# Patient Record
Sex: FEMALE | Race: WHITE | Employment: OTHER | URBAN - METROPOLITAN AREA
[De-identification: names, ages, dates, MRNs, and addresses within clinical notes are randomized per-mention and may not be internally consistent; named-entity substitution may affect disease eponyms.]

---

## 2019-02-12 ENCOUNTER — APPOINTMENT (OUTPATIENT)
Dept: CT IMAGING | Age: 69
End: 2019-02-12
Attending: EMERGENCY MEDICINE
Payer: MEDICARE

## 2019-02-12 ENCOUNTER — APPOINTMENT (OUTPATIENT)
Dept: GENERAL RADIOLOGY | Age: 69
End: 2019-02-12
Attending: EMERGENCY MEDICINE
Payer: MEDICARE

## 2019-02-12 ENCOUNTER — HOSPITAL ENCOUNTER (EMERGENCY)
Age: 69
Discharge: HOME | End: 2019-02-12
Attending: EMERGENCY MEDICINE
Payer: MEDICARE

## 2019-02-12 VITALS
TEMPERATURE: 97.5 F | WEIGHT: 190 LBS | HEIGHT: 68 IN | SYSTOLIC BLOOD PRESSURE: 151 MMHG | RESPIRATION RATE: 16 BRPM | BODY MASS INDEX: 28.79 KG/M2 | HEART RATE: 69 BPM | OXYGEN SATURATION: 96 % | DIASTOLIC BLOOD PRESSURE: 83 MMHG

## 2019-02-12 DIAGNOSIS — S52.124A CLOSED NONDISPLACED FRACTURE OF HEAD OF RIGHT RADIUS, INITIAL ENCOUNTER: Primary | ICD-10-CM

## 2019-02-12 PROCEDURE — 99283 EMERGENCY DEPT VISIT LOW MDM: CPT

## 2019-02-12 PROCEDURE — 73090 X-RAY EXAM OF FOREARM: CPT

## 2019-02-12 PROCEDURE — 74011250637 HC RX REV CODE- 250/637: Performed by: EMERGENCY MEDICINE

## 2019-02-12 PROCEDURE — 70450 CT HEAD/BRAIN W/O DYE: CPT

## 2019-02-12 PROCEDURE — 70486 CT MAXILLOFACIAL W/O DYE: CPT

## 2019-02-12 PROCEDURE — 71101 X-RAY EXAM UNILAT RIBS/CHEST: CPT

## 2019-02-12 PROCEDURE — A4565 SLINGS: HCPCS

## 2019-02-12 PROCEDURE — 73070 X-RAY EXAM OF ELBOW: CPT

## 2019-02-12 PROCEDURE — 93005 ELECTROCARDIOGRAM TRACING: CPT

## 2019-02-12 RX ORDER — HYDROCODONE BITARTRATE AND ACETAMINOPHEN 5; 325 MG/1; MG/1
2 TABLET ORAL
Status: COMPLETED | OUTPATIENT
Start: 2019-02-12 | End: 2019-02-12

## 2019-02-12 RX ORDER — HYDROCODONE BITARTRATE AND ACETAMINOPHEN 5; 325 MG/1; MG/1
1 TABLET ORAL
Qty: 15 TAB | Refills: 0 | Status: SHIPPED | OUTPATIENT
Start: 2019-02-12

## 2019-02-12 RX ADMIN — HYDROCODONE BITARTRATE AND ACETAMINOPHEN 2 TABLET: 5; 325 TABLET ORAL at 20:59

## 2019-02-12 NOTE — ED PROVIDER NOTES
EMERGENCY DEPARTMENT HISTORY AND PHYSICAL EXAM 
 
 
Date: 2/12/2019 Patient Name: Joshua Dooley History of Presenting Illness Chief Complaint Patient presents with  Fall  
  pt reports she lost her balance and fell just before coming to ED, reports left lower arm pain, headache, pain to left side of face, and chest pain  Arm Injury  Facial Pain  Chest Pain History Provided By: Patient HPI: Joshua Dooley, 77 y.o. female with no significant PMHx, presents ambulatory to the ED with cc of a R forearm and mid chest pain s/p a fall PTA today. She denies any associated symptoms. She states she had gotten up from the table and did not see her dog, when she had tripped over the pet and fallen onto her R side on engineered hardwood tima. She had attempted to break the fall with her R hand, but states she had injured her arm instead. She endorses hitting her head, but denies any LOC. Pt reports an exacerbating of pain in her R forearm with the application of pressure. She endorses taking Tylenol PTA. Pt denies any lower extremity complaints. There are no other complaints, changes, or physical findings at this time. PCP: Jagruti Kay MD 
 
No current facility-administered medications on file prior to encounter. No current outpatient medications on file prior to encounter. Past History Past Medical History: No past medical history on file. Past Surgical History: No past surgical history on file. Family History: No family history on file. Social History: 
Social History Tobacco Use  Smoking status: Not on file Substance Use Topics  Alcohol use: Not on file  Drug use: Not on file Allergies: Allergies Allergen Reactions  Bactrim [Sulfamethoprim] Other (comments) Vision change  Sulfa (Sulfonamide Antibiotics) Other (comments) Review of Systems Review of Systems Constitutional: Negative for activity change, chills and fever. HENT: Negative for congestion and sore throat. Eyes: Negative for pain and redness. Respiratory: Negative for cough, chest tightness and shortness of breath. Cardiovascular: Positive for chest pain. Negative for palpitations. Gastrointestinal: Negative for abdominal pain, diarrhea, nausea and vomiting. Genitourinary: Negative for dysuria, frequency and urgency. Musculoskeletal: Positive for myalgias (+RUE). Negative for back pain and neck pain. Skin: Negative for rash. Neurological: Negative for syncope, light-headedness and headaches. Psychiatric/Behavioral: Negative for confusion. All other systems reviewed and are negative. Physical Exam  
Physical Exam  
Constitutional: She is oriented to person, place, and time. She appears well-developed and well-nourished. No distress. HENT:  
Head: Normocephalic. Nose: Nose normal.  
Mouth/Throat: Oropharynx is clear and moist. No oropharyngeal exudate. Eyes: Conjunctivae are normal. Pupils are equal, round, and reactive to light. No scleral icterus. Neck: Normal range of motion. Neck supple. No JVD present. No tracheal deviation present. No thyromegaly present. No midline vertebral tenderness; full AROM without pain  
Cardiovascular: Normal rate, regular rhythm and intact distal pulses. Exam reveals no gallop and no friction rub. No murmur heard. Pulmonary/Chest: Effort normal and breath sounds normal. No stridor. No respiratory distress. She has no wheezes. She has no rales. Abdominal: Soft. Bowel sounds are normal. She exhibits no distension. There is no tenderness. There is no rebound and no guarding. Musculoskeletal: Normal range of motion. She exhibits tenderness. She exhibits no edema.   
Moderate tenderness mid shaft radius; shoulder/upper arm non-tender; hand/wrist non-tender; mild diffuse tenderness elbow; no gross deformity; skin intact; 2+ radial/ulnar pulse Lymphadenopathy:  
  She has no cervical adenopathy. Neurological: She is alert and oriented to person, place, and time. No cranial nerve deficit. She exhibits normal muscle tone. Coordination normal.  
Skin: Skin is warm and dry. No rash noted. She is not diaphoretic. No erythema. Psychiatric: She has a normal mood and affect. Her behavior is normal.  
Nursing note and vitals reviewed. Diagnostic Study Results Labs - Recent Results (from the past 12 hour(s)) EKG, 12 LEAD, INITIAL Collection Time: 02/12/19  6:32 PM  
Result Value Ref Range Ventricular Rate 63 BPM  
 Atrial Rate 62 BPM  
 QRS Duration 70 ms Q-T Interval 452 ms QTC Calculation (Bezet) 462 ms Calculated R Axis 21 degrees Calculated T Axis 50 degrees Diagnosis Junctional rhythm with premature ventricular complexes or fusion complexes Cannot rule out Anterior infarct , age undetermined No previous ECGs available Radiologic Studies -  
XR RIBS RT W PA CXR MIN 3 V Final Result IMPRESSION: Clear lungs. No pneumothorax. No rib fracture visualized. XR ELBOW RT AP/LAT Final Result IMPRESSION: Right elbow joint effusion. Probable minimally displaced radial head  
fracture. XR FOREARM RT AP/LAT Final Result IMPRESSION: No acute fracture. CT Results  (Last 48 hours) 02/12/19 1939  CT HEAD WO CONT Final result Impression:  IMPRESSION: No acute intracranial hemorrhage, mass or infarct. Narrative:  INDICATION: Head trauma, closed, mild, abn neuro exam and/or risk factors. Fall,  
headache. Exam: Noncontrast CT of the brain is performed with 5 mm collimation. CT dose reduction was achieved with the use of the standardized protocol  
tailored for this examination and automatic exposure control for dose  
modulation. FINDINGS: There is no acute intracranial hemorrhage, mass, mass effect or herniation. Ventricular system is normal. The gray-white matter differentiation  
is well-preserved. The mastoid air cells are well pneumatized. The visualized  
paranasal sinuses are normal.  
   
  
 02/12/19 1939  CT MAXILLOFACIAL WO CONT Final result Impression:  IMPRESSION: No acute fracture. Narrative:  INDICATION: Fall, left facial pain. Exam: Noncontrast CT of the face is performed with 2.5 mm collimation. Coronal  
and sagittal reformatted images were also obtained. CT dose reduction was achieved through the use of a standardized protocol  
tailored for this examination and automatic exposure control for dose  
modulation. FINDINGS: There is a 2 mm density seen within the subcutis fat superficial to  
the left frontal sinus; no associated laceration is visualized. No acute  
fracture is visualized. Visualized articulations are normal. No air-fluid level  
is seen within the paranasal sinuses. The globes are intact bilaterally. Orbits  
are intact. Extraocular muscles and optic nerves are grossly normal. Mastoid air  
cells are well pneumatized. Osseous structures are diffusely demineralized. Medical Decision Making I am the first provider for this patient. I reviewed the vital signs, available nursing notes, past medical history, past surgical history, family history and social history. Vital Signs-Reviewed the patient's vital signs. Patient Vitals for the past 12 hrs: 
 Temp Pulse Resp BP SpO2  
02/12/19 1826 97.5 °F (36.4 °C) 69 16 151/83 96 % Records Reviewed: Nursing Notes, Old Medical Records, Previous Radiology Studies and Previous Laboratory Studies Provider Notes (Medical Decision Making): DDx: fracture, contusion, ICH 
 
ED Course:  
Initial assessment performed.  The patients presenting problems have been discussed, and they are in agreement with the care plan formulated and outlined with them. I have encouraged them to ask questions as they arise throughout their visit. PROGRESS NOTE: 
8:54 PM 
Tiger texting orthopedics SALVADOR Morton and Dr. Hamilton Kenny. PROGRESS NOTE: 
8:58 PM 
Dr. Hamilton Kenny recommends placing a nonweight bearing sling for comfort. She will see the pt in office on Friday (2/15) or Monday (2/18). ED EKG interpretation: 18:32 Rhythm: sinus vs junctional rhythm; and regular . Rate (approx.): 63; Axis: normal; ID Interval: n/a; QRS interval: normal ; ST/T wave: non-specific changes; This EKG was interpreted by Luisa Flood MD,ED Provider. Critical Care Time:  
0 Disposition: 
DISCHARGE NOTE 
8:59 PM 
The patient has been re-evaluated and is ready for discharge. Reviewed available results with patient. Counseled patient on diagnosis and care plan. Patient has expressed understanding, and all questions have been answered. Patient agrees with plan and agrees to follow up as recommended, or return to the ED if their symptoms worsen. Discharge instructions have been provided and explained to the patient, along with reasons to return to the ED. PLAN: 
1. Discharge Current Discharge Medication List  
  
START taking these medications Details HYDROcodone-acetaminophen (NORCO) 5-325 mg per tablet Take 1 Tab by mouth every four (4) hours as needed for Pain. Max Daily Amount: 6 Tabs. Qty: 15 Tab, Refills: 0 Associated Diagnoses: Closed nondisplaced fracture of head of right radius, initial encounter 2. Follow-up Information Follow up With Specialties Details Why Contact Info Fahad Arredondo MD Hand Surgery Schedule an appointment as soon as possible for a visit in 2 days Call the office tomorrow. Dr. Hamilton Kenny would like to see you Friday or next monday. [facility] [address] [place] [address] 
[phone] [facility] EMERGENCY DEPT Emergency Medicine Go in 1 day If symptoms worsen [address] [facility] [address] [facility] 
[phone] Return to ED if worse Diagnosis Clinical Impression: 1. Closed nondisplaced fracture of head of right radius, initial encounter Attestations: This note is prepared by Jensen Au, acting as Scribe for Jonh Guzmán MD. Jonh Guzmán MD: The scribe's documentation has been prepared under my direction and personally reviewed by me in its entirety. I confirm that the note above accurately reflects all work, treatment, procedures, and medical decision making performed by me. This note will not be viewable in [address]

## 2019-02-13 LAB
ATRIAL RATE: 62 BPM
CALCULATED R AXIS, ECG10: 21 DEGREES
CALCULATED T AXIS, ECG11: 50 DEGREES
DIAGNOSIS, 93000: NORMAL
Q-T INTERVAL, ECG07: 452 MS
QRS DURATION, ECG06: 70 MS
QTC CALCULATION (BEZET), ECG08: 462 MS
VENTRICULAR RATE, ECG03: 63 BPM

## 2019-02-13 NOTE — PROGRESS NOTES
7:00 PM 
Verbal report received from Katlyn Watts RN. Patient presents with complaints of a fall that resulted in arm pain, chest pain, and face pain. 7:18 PM 
Patient off floor to CT.

## 2019-02-13 NOTE — DISCHARGE INSTRUCTIONS
Patient Education        Broken Arm: Care Instructions  Your Care Instructions  Fractures can range from a small, hairline crack, to a bone or bones broken into two or more pieces. Your treatment depends on how bad the break is. Your doctor may have put your arm in a splint or cast to allow it to heal or to keep it stable until you see another doctor. It may take weeks or months for your arm to heal. You can help your arm heal with some care at home. You heal best when you take good care of yourself. Eat a variety of healthy foods, and don't smoke. You may have had a sedative to help you relax. You may be unsteady after having sedation. It can take a few hours for the medicine's effects to wear off. Common side effects of sedation include nausea, vomiting, and feeling sleepy or tired. The doctor has checked you carefully, but problems can develop later. If you notice any problems or new symptoms, get medical treatment right away. Follow-up care is a key part of your treatment and safety. Be sure to make and go to all appointments, and call your doctor if you are having problems. It's also a good idea to know your test results and keep a list of the medicines you take. How can you care for yourself at home? · If the doctor gave you a sedative:  ? For 24 hours, don't do anything that requires attention to detail. It takes time for the medicine's effects to completely wear off.  ? For your safety, do not drive or operate any machinery that could be dangerous. Wait until the medicine wears off and you can think clearly and react easily. · Put ice or a cold pack on your arm for 10 to 20 minutes at a time. Try to do this every 1 to 2 hours for the next 3 days (when you are awake). Put a thin cloth between the ice and your cast or splint. Keep the cast or splint dry. · Follow the cast care instructions your doctor gives you. If you have a splint, do not take it off unless your doctor tells you to.   · Be safe with medicines. Take pain medicines exactly as directed. ? If the doctor gave you a prescription medicine for pain, take it as prescribed. ? If you are not taking a prescription pain medicine, ask your doctor if you can take an over-the-counter medicine. · Prop up your arm on pillows when you sit or lie down in the first few days after the injury. Keep the arm higher than the level of your heart. This will help reduce swelling. · Follow instructions for exercises to keep your arm strong. · Wiggle your fingers and wrist often to reduce swelling and stiffness. When should you call for help? Call 911 anytime you think you may need emergency care. For example, call if:    · You are very sleepy and you have trouble waking up.    Call your doctor now or seek immediate medical care if:    · You have new or worse nausea or vomiting.     · You have new or worse pain.     · Your hand or fingers are cool or pale or change color.     · Your cast or splint feels too tight.     · You have tingling, weakness, or numbness in your hand or fingers.    Watch closely for changes in your health, and be sure to contact your doctor if:    · You do not get better as expected.     · You have problems with your cast or splint. Where can you learn more? Go to http://wesly-serena.info/. Enter B945 in the search box to learn more about \"Broken Arm: Care Instructions. \"  Current as of: September 20, 2018  Content Version: 11.9  © 9789-2330 [employer]. Care instructions adapted under license by [employer] (which disclaims liability or warranty for this information). If you have questions about a medical condition or this instruction, always ask your healthcare professional. [place] [address] any warranty or liability for your use of this information.          Patient Education        Broken Radial Head of the Elbow: Care Instructions  Your Care Instructions  The radius is one of the two long bones in your lower arm. The radial head is the small part of this bone near the elbow. This bone may break (fracture) during sports or an accident. It may happen when your arm is hit or is used to protect you in a fall. Fractures can range from a small, hairline crack to a bone that is broken into two or more pieces. Your treatment depends on how bad the break is. Your doctor may have put your arm in a cast or splint. This will allow your elbow to heal or will keep it stable until you see another doctor. You also might wear a sling to help support your arm. It may take weeks or months for your elbow to heal. You can help it heal with some care at home. You heal best when you take good care of yourself. Eat a variety of healthy foods, and don't smoke. You may have had a sedative to help you relax. You may be unsteady after having sedation. It can take a few hours for the medicine's effects to wear off. Common side effects of sedation include nausea, vomiting, and feeling sleepy or tired. The doctor has checked you carefully, but problems can develop later. If you notice any problems or new symptoms, get medical treatment right away. Follow-up care is a key part of your treatment and safety. Be sure to make and go to all appointments, and call your doctor if you are having problems. It's also a good idea to know your test results and keep a list of the medicines you take. How can you care for yourself at home? · If the doctor gave you a sedative:  ? For 24 hours, don't do anything that requires attention to detail. It takes time for the medicine's effects to completely wear off.  ? For your safety, do not drive or operate any machinery that could be dangerous. Wait until the medicine wears off and you can think clearly and react easily. · Put ice or a cold pack on your arm for 10 to 20 minutes at a time. Try to do this every 1 to 2 hours for the next 3 days (when you are awake).  Put a thin cloth between the ice and your cast or splint. Keep your cast or splint dry. · Follow the cast care instructions your doctor gives you. If you have a splint, do not take it off unless your doctor tells you to. · Be safe with medicines. Read and follow all instructions on the label. ? If the doctor gave you a prescription medicine for pain, take it as prescribed. ? If you are not taking a prescription pain medicine, ask your doctor if you can take an over-the-counter medicine. · Prop up the sore arm on a pillow when you ice it or anytime you sit or lie down during the next 3 days. Try to keep it above the level of your heart. This will help reduce swelling. · Follow instructions for exercises to keep your arm strong. · Wiggle your fingers and wrist often to reduce swelling and stiffness. When should you call for help? Call your doctor now or seek immediate medical care if:    · You have new or worse pain.     · Your hand or fingers are cool or pale or change color.     · Your cast or splint feels too tight.     · You have tingling, weakness, or numbness in your hand or fingers.    Watch closely for changes in your health, and be sure to contact your doctor if:    · You do not get better as expected.     · You have problems with your cast or splint. Where can you learn more? Go to http://wesly-serena.info/. Enter [id number] in the search box to learn more about \"Broken Radial Head of the Elbow: Care Instructions. \"  Current as of: September 20, 2018  Content Version: 11.9  © 2794-3012 [employer]. Care instructions adapted under license by [employer] (which disclaims liability or warranty for this information). If you have questions about a medical condition or this instruction, always ask your healthcare professional. [address] any warranty or liability for your use of this information.

## 2019-10-14 ENCOUNTER — HOSPITAL ENCOUNTER (OUTPATIENT)
Dept: ULTRASOUND IMAGING | Age: 69
Discharge: HOME | End: 2019-10-14
Attending: FAMILY MEDICINE
Payer: MEDICARE

## 2019-10-14 DIAGNOSIS — E04.1 NONTOXIC UNINODULAR GOITER: ICD-10-CM

## 2019-10-14 PROCEDURE — 76536 US EXAM OF HEAD AND NECK: CPT

## 2021-02-26 ENCOUNTER — APPOINTMENT (OUTPATIENT)
Dept: GENERAL RADIOLOGY | Age: 71
End: 2021-02-26
Attending: EMERGENCY MEDICINE
Payer: MEDICARE

## 2021-02-26 ENCOUNTER — APPOINTMENT (OUTPATIENT)
Dept: CT IMAGING | Age: 71
End: 2021-02-26
Attending: EMERGENCY MEDICINE
Payer: MEDICARE

## 2021-02-26 ENCOUNTER — HOSPITAL ENCOUNTER (EMERGENCY)
Age: 71
Discharge: HOME | End: 2021-02-26
Attending: EMERGENCY MEDICINE
Payer: MEDICARE

## 2021-02-26 VITALS
HEIGHT: 67 IN | OXYGEN SATURATION: 97 % | RESPIRATION RATE: 18 BRPM | BODY MASS INDEX: 33.77 KG/M2 | TEMPERATURE: 98.3 F | HEART RATE: 72 BPM | SYSTOLIC BLOOD PRESSURE: 151 MMHG | WEIGHT: 215.17 LBS | DIASTOLIC BLOOD PRESSURE: 78 MMHG

## 2021-02-26 DIAGNOSIS — M75.102 LEFT ROTATOR CUFF TEAR ARTHROPATHY: ICD-10-CM

## 2021-02-26 DIAGNOSIS — E86.0 DEHYDRATION: ICD-10-CM

## 2021-02-26 DIAGNOSIS — M12.812 LEFT ROTATOR CUFF TEAR ARTHROPATHY: ICD-10-CM

## 2021-02-26 DIAGNOSIS — R40.4 ALTERED AWARENESS, TRANSIENT: Primary | ICD-10-CM

## 2021-02-26 LAB
ALBUMIN SERPL-MCNC: 3.4 G/DL (ref 3.5–5)
ALBUMIN/GLOB SERPL: 1 {RATIO} (ref 1.1–2.2)
ALP SERPL-CCNC: 67 U/L (ref 45–117)
ALT SERPL-CCNC: 20 U/L (ref 12–78)
ANION GAP SERPL CALC-SCNC: 4 MMOL/L (ref 5–15)
APPEARANCE UR: CLEAR
AST SERPL-CCNC: 50 U/L (ref 15–37)
BACTERIA URNS QL MICRO: NEGATIVE /HPF
BASOPHILS # BLD: 0 K/UL (ref 0–0.1)
BASOPHILS NFR BLD: 0 % (ref 0–1)
BILIRUB SERPL-MCNC: 2.1 MG/DL (ref 0.2–1)
BILIRUB UR QL CFM: NEGATIVE
BUN SERPL-MCNC: 28 MG/DL (ref 6–20)
BUN/CREAT SERPL: 30 (ref 12–20)
CALCIUM SERPL-MCNC: 8.9 MG/DL (ref 8.5–10.1)
CAOX CRY URNS QL MICRO: ABNORMAL
CHLORIDE SERPL-SCNC: 109 MMOL/L (ref 97–108)
CO2 SERPL-SCNC: 30 MMOL/L (ref 21–32)
COLOR UR: ABNORMAL
CREAT SERPL-MCNC: 0.92 MG/DL (ref 0.55–1.02)
DIFFERENTIAL METHOD BLD: ABNORMAL
EOSINOPHIL # BLD: 0.1 K/UL (ref 0–0.4)
EOSINOPHIL NFR BLD: 3 % (ref 0–7)
EPITH CASTS URNS QL MICRO: ABNORMAL /LPF
ERYTHROCYTE [DISTWIDTH] IN BLOOD BY AUTOMATED COUNT: 12.9 % (ref 11.5–14.5)
GLOBULIN SER CALC-MCNC: 3.3 G/DL (ref 2–4)
GLUCOSE SERPL-MCNC: 115 MG/DL (ref 65–100)
GLUCOSE UR STRIP.AUTO-MCNC: NEGATIVE MG/DL
HCT VFR BLD AUTO: 35.3 % (ref 35–47)
HGB BLD-MCNC: 11.4 G/DL (ref 11.5–16)
HGB UR QL STRIP: ABNORMAL
HYALINE CASTS URNS QL MICRO: ABNORMAL /LPF (ref 0–5)
IMM GRANULOCYTES # BLD AUTO: 0 K/UL (ref 0–0.04)
IMM GRANULOCYTES NFR BLD AUTO: 0 % (ref 0–0.5)
KETONES UR QL STRIP.AUTO: ABNORMAL MG/DL
LEUKOCYTE ESTERASE UR QL STRIP.AUTO: NEGATIVE
LYMPHOCYTES # BLD: 0.9 K/UL (ref 0.8–3.5)
LYMPHOCYTES NFR BLD: 18 % (ref 12–49)
MCH RBC QN AUTO: 32.8 PG (ref 26–34)
MCHC RBC AUTO-ENTMCNC: 32.3 G/DL (ref 30–36.5)
MCV RBC AUTO: 101.4 FL (ref 80–99)
MONOCYTES # BLD: 0.5 K/UL (ref 0–1)
MONOCYTES NFR BLD: 10 % (ref 5–13)
MUCOUS THREADS URNS QL MICRO: ABNORMAL /LPF
NEUTS SEG # BLD: 3.5 K/UL (ref 1.8–8)
NEUTS SEG NFR BLD: 69 % (ref 32–75)
NITRITE UR QL STRIP.AUTO: NEGATIVE
NRBC # BLD: 0 K/UL (ref 0–0.01)
NRBC BLD-RTO: 0 PER 100 WBC
PH UR STRIP: 5.5 [PH] (ref 5–8)
PLATELET # BLD AUTO: 131 K/UL (ref 150–400)
PMV BLD AUTO: 10.1 FL (ref 8.9–12.9)
POTASSIUM SERPL-SCNC: 3.9 MMOL/L (ref 3.5–5.1)
PROT SERPL-MCNC: 6.7 G/DL (ref 6.4–8.2)
PROT UR STRIP-MCNC: ABNORMAL MG/DL
RBC # BLD AUTO: 3.48 M/UL (ref 3.8–5.2)
RBC #/AREA URNS HPF: ABNORMAL /HPF (ref 0–5)
SODIUM SERPL-SCNC: 143 MMOL/L (ref 136–145)
SP GR UR REFRACTOMETRY: >1.03 (ref 1–1.03)
UROBILINOGEN UR QL STRIP.AUTO: 1 EU/DL (ref 0.2–1)
WBC # BLD AUTO: 5.1 K/UL (ref 3.6–11)
WBC URNS QL MICRO: ABNORMAL /HPF (ref 0–4)

## 2021-02-26 PROCEDURE — 36415 COLL VENOUS BLD VENIPUNCTURE: CPT

## 2021-02-26 PROCEDURE — 73030 X-RAY EXAM OF SHOULDER: CPT

## 2021-02-26 PROCEDURE — 70450 CT HEAD/BRAIN W/O DYE: CPT

## 2021-02-26 PROCEDURE — 96360 HYDRATION IV INFUSION INIT: CPT

## 2021-02-26 PROCEDURE — 99285 EMERGENCY DEPT VISIT HI MDM: CPT

## 2021-02-26 PROCEDURE — 85025 COMPLETE CBC W/AUTO DIFF WBC: CPT

## 2021-02-26 PROCEDURE — 74011250636 HC RX REV CODE- 250/636: Performed by: EMERGENCY MEDICINE

## 2021-02-26 PROCEDURE — 96361 HYDRATE IV INFUSION ADD-ON: CPT

## 2021-02-26 PROCEDURE — 81001 URINALYSIS AUTO W/SCOPE: CPT

## 2021-02-26 PROCEDURE — 80053 COMPREHEN METABOLIC PANEL: CPT

## 2021-02-26 RX ADMIN — SODIUM CHLORIDE 1000 ML: 9 INJECTION, SOLUTION INTRAVENOUS at 12:08

## 2021-02-26 NOTE — ED NOTES
Dr. Brielle Jose at bedside to provide discharge paperwork. Vital signs stable. Pt in no apparent distress at this time. Mental status at baseline. Wheeled out by ER staff, discharge paperwork in hand. Patient and or family acknowledged and signed discharge instructions that were created/provided by the Physician. Signed discharge form with patient label placed in scan box. Accompanied by family to drive pt home.

## 2021-02-26 NOTE — ED PROVIDER NOTES
EMERGENCY DEPARTMENT HISTORY AND PHYSICAL EXAM      Date: 2/26/2021  Patient Name: Coy Albarran    History of Presenting Illness     Chief Complaint   Patient presents with    Altered mental status     Tuesday and Wednesday she was found to be very confused. Diagnosed with a UTI. She does not remember a fall but this morning they noted a large purpling bruise on her left flank and left shoulder pain.  Shoulder Pain     Left shoulder pain. History Provided By: Patient and Patient's Daughter    HPI: Coy Albarran, 80 y.o. female presents to the ED with cc of altered mental status and left shoulder pain. The patient's confusion started 3 days ago. She was diagnosed with a urinary tract infection and started antibiotics 2 days ago. She has a bruise on her left upper flank and buttock and left shoulder pain. She does not remember falling, and does not know how she got the bruising. Left shoulder pain is worse with movement, but is of moderate severity at rest.  Patient denies headache, chest pain, neck pain, cough, shortness of breath, fever or chills. She takes baby aspirin, but is not on any other blood thinners. She denies numbness or tingling. The confusion, her daughter is concerned about, is the fact that the patient does not remember falling. It is not clear that she did fall, because no one witnessed it. There are no other complaints, changes, or physical findings at this time. PCP: Trae Pickens MD    No current facility-administered medications on file prior to encounter. Current Outpatient Medications on File Prior to Encounter   Medication Sig Dispense Refill    HYDROcodone-acetaminophen (NORCO) 5-325 mg per tablet Take 1 Tab by mouth every four (4) hours as needed for Pain. Max Daily Amount: 6 Tabs. 15 Tab 0       Past History     Past Medical History:  History reviewed. No pertinent past medical history. Past Surgical History:  History reviewed.  No pertinent surgical history. Family History:  History reviewed. No pertinent family history. Social History:  Social History     Tobacco Use    Smoking status: Never Smoker    Smokeless tobacco: Never Used   Substance Use Topics    Alcohol use: Never     Frequency: Never    Drug use: Never       Allergies: Allergies   Allergen Reactions    Bactrim [Sulfamethoprim] Other (comments)     Vision change    Sulfa (Sulfonamide Antibiotics) Other (comments)         Review of Systems   Review of Systems   Constitutional: Negative for fever. HENT: Negative for congestion. Eyes: Negative. Respiratory: Negative for shortness of breath. Cardiovascular: Negative for chest pain. Gastrointestinal: Negative for abdominal pain. Endocrine: Negative for heat intolerance. Genitourinary: Negative for dysuria. Musculoskeletal: Negative for back pain. Skin: Negative for rash. Allergic/Immunologic: Negative for immunocompromised state. Neurological: Negative for dizziness. Hematological: Bruises/bleeds easily. Psychiatric/Behavioral: Negative. All other systems reviewed and are negative. Physical Exam   Physical Exam  Vitals signs and nursing note reviewed. Constitutional:       General: She is not in acute distress. Appearance: She is well-developed. HENT:      Head: Normocephalic. Neck:      Musculoskeletal: Normal range of motion and neck supple. Cardiovascular:      Rate and Rhythm: Normal rate and regular rhythm. Heart sounds: Normal heart sounds. Pulmonary:      Effort: Pulmonary effort is normal.      Breath sounds: Normal breath sounds. Abdominal:      General: Bowel sounds are normal.      Palpations: Abdomen is soft. Tenderness: There is no abdominal tenderness. Musculoskeletal: Normal range of motion. General: Tenderness present. Comments: Left shoulder tenderness   Skin:     General: Skin is warm and dry.       Comments: Contusion of left upper flank Neurological:      Mental Status: She is alert. Comments: Alert and oriented x2+, the patient knew the year and the month but stated it was the 20th. Psychiatric:         Mood and Affect: Mood normal.         Behavior: Behavior normal.         Diagnostic Study Results     Labs -     Recent Results (from the past 12 hour(s))   CBC WITH AUTOMATED DIFF    Collection Time: 02/26/21 10:42 AM   Result Value Ref Range    WBC 5.1 3.6 - 11.0 K/uL    RBC 3.48 (L) 3.80 - 5.20 M/uL    HGB 11.4 (L) 11.5 - 16.0 g/dL    HCT 35.3 35.0 - 47.0 %    .4 (H) 80.0 - 99.0 FL    MCH 32.8 26.0 - 34.0 PG    MCHC 32.3 30.0 - 36.5 g/dL    RDW 12.9 11.5 - 14.5 %    PLATELET 134 (L) 457 - 400 K/uL    MPV 10.1 8.9 - 12.9 FL    NRBC 0.0 0  WBC    ABSOLUTE NRBC 0.00 0.00 - 0.01 K/uL    NEUTROPHILS 69 32 - 75 %    LYMPHOCYTES 18 12 - 49 %    MONOCYTES 10 5 - 13 %    EOSINOPHILS 3 0 - 7 %    BASOPHILS 0 0 - 1 %    IMMATURE GRANULOCYTES 0 0.0 - 0.5 %    ABS. NEUTROPHILS 3.5 1.8 - 8.0 K/UL    ABS. LYMPHOCYTES 0.9 0.8 - 3.5 K/UL    ABS. MONOCYTES 0.5 0.0 - 1.0 K/UL    ABS. EOSINOPHILS 0.1 0.0 - 0.4 K/UL    ABS. BASOPHILS 0.0 0.0 - 0.1 K/UL    ABS. IMM. GRANS. 0.0 0.00 - 0.04 K/UL    DF AUTOMATED     METABOLIC PANEL, COMPREHENSIVE    Collection Time: 02/26/21 10:42 AM   Result Value Ref Range    Sodium 143 136 - 145 mmol/L    Potassium 3.9 3.5 - 5.1 mmol/L    Chloride 109 (H) 97 - 108 mmol/L    CO2 30 21 - 32 mmol/L    Anion gap 4 (L) 5 - 15 mmol/L    Glucose 115 (H) 65 - 100 mg/dL    BUN 28 (H) 6 - 20 MG/DL    Creatinine 0.92 0.55 - 1.02 MG/DL    BUN/Creatinine ratio 30 (H) 12 - 20      GFR est AA >60 >60 ml/min/1.73m2    GFR est non-AA >60 >60 ml/min/1.73m2    Calcium 8.9 8.5 - 10.1 MG/DL    Bilirubin, total 2.1 (H) 0.2 - 1.0 MG/DL    ALT (SGPT) 20 12 - 78 U/L    AST (SGOT) 50 (H) 15 - 37 U/L    Alk.  phosphatase 67 45 - 117 U/L    Protein, total 6.7 6.4 - 8.2 g/dL    Albumin 3.4 (L) 3.5 - 5.0 g/dL    Globulin 3.3 2.0 - 4.0 g/dL A-G Ratio 1.0 (L) 1.1 - 2.2     URINALYSIS W/ RFLX MICROSCOPIC    Collection Time: 02/26/21 11:29 AM   Result Value Ref Range    Color DARK YELLOW      Appearance CLEAR CLEAR      Specific gravity >1.030 (H) 1.003 - 1.030    pH (UA) 5.5 5.0 - 8.0      Protein TRACE (A) NEG mg/dL    Glucose Negative NEG mg/dL    Ketone TRACE (A) NEG mg/dL    Blood TRACE (A) NEG      Urobilinogen 1.0 0.2 - 1.0 EU/dL    Nitrites Negative NEG      Leukocyte Esterase Negative NEG     BILIRUBIN, CONFIRM    Collection Time: 02/26/21 11:29 AM   Result Value Ref Range    Bilirubin UA, confirm Negative     URINE MICROSCOPIC ONLY    Collection Time: 02/26/21 11:29 AM   Result Value Ref Range    WBC 0-4 0 - 4 /hpf    RBC 5-10 0 - 5 /hpf    Epithelial cells FEW FEW /lpf    Bacteria Negative NEG /hpf    Mucus 3+ (A) NEG /lpf    CA Oxalate crystals FEW (A) NEG      Hyaline cast 2-5 0 - 5 /lpf       Radiologic Studies -   CT HEAD WO CONT   Final Result   No acute process or change compared to the prior exam.            XR SHOULDER LT AP/LAT MIN 2 V   Final Result   Degenerative changes without acute abnormality. CT Results  (Last 48 hours)    None        CXR Results  (Last 48 hours)    None          Medical Decision Making   I am the first provider for this patient. I reviewed the vital signs, available nursing notes, past medical history, past surgical history, family history and social history. Vital Signs-Reviewed the patient's vital signs. Patient Vitals for the past 12 hrs:   Temp Pulse Resp BP SpO2   02/26/21 0957 98.3 °F (36.8 °C) 72 18 (!) 149/82 100 %         Records Reviewed: Nursing Notes    Provider Notes (Medical Decision Making):   , sprain, contusion, arthritis , UTI, fracture UTI, metabolic encephalopathy,    ED Course:   Initial assessment performed. The patients presenting problems have been discussed, and they are in agreement with the care plan formulated and outlined with them.   I have encouraged them to ask questions as they arise throughout their visit. Progress note: The patient is feeling better. Her results were reviewed. She is advised to follow-up and return to ER if worse           Critical Care Time:   0    Disposition:  home    DISCHARGE PLAN:  1. Discharge Medication List as of 2/26/2021  1:04 PM        2. Follow-up Information     Follow up With Specialties Details Why Contact Info    Keya Tavares MD Family Medicine In 3 days As needed [facility]  [address] [facility]  [phone]      [facility] EMERGENCY DEPT Emergency Medicine  If symptoms worsen [address]  [address] [facility]  [phone]        3. Return to ED if worse     Diagnosis     Clinical Impression:   1. Altered awareness, transient    2. Dehydration    3. Left rotator cuff tear arthropathy        Attestations:    Megha Redd MD    Please note that this dictation was completed with [employer], the computer voice recognition software. Quite often unanticipated grammatical, syntax, homophones, and other interpretive errors are inadvertently transcribed by the computer software. Please disregard these errors. Please excuse any errors that have escaped final proofreading. Thank you.

## 2022-06-02 ENCOUNTER — HOSPITAL ENCOUNTER (EMERGENCY)
Age: 72
Discharge: HOME | End: 2022-06-02
Attending: EMERGENCY MEDICINE
Payer: MEDICARE

## 2022-06-02 ENCOUNTER — APPOINTMENT (OUTPATIENT)
Dept: CT IMAGING | Age: 72
End: 2022-06-02
Attending: EMERGENCY MEDICINE
Payer: MEDICARE

## 2022-06-02 ENCOUNTER — APPOINTMENT (OUTPATIENT)
Dept: GENERAL RADIOLOGY | Age: 72
End: 2022-06-02
Attending: EMERGENCY MEDICINE
Payer: MEDICARE

## 2022-06-02 ENCOUNTER — HOSPITAL ENCOUNTER (EMERGENCY)
Age: 72
Discharge: STILL ADMITTED OR ADMINISTRATIVE | End: 2022-06-02

## 2022-06-02 VITALS
HEIGHT: 67 IN | WEIGHT: 199 LBS | HEART RATE: 66 BPM | SYSTOLIC BLOOD PRESSURE: 146 MMHG | OXYGEN SATURATION: 95 % | BODY MASS INDEX: 31.23 KG/M2 | RESPIRATION RATE: 17 BRPM | TEMPERATURE: 98 F | DIASTOLIC BLOOD PRESSURE: 94 MMHG

## 2022-06-02 DIAGNOSIS — R22.1 NECK MASS: Primary | ICD-10-CM

## 2022-06-02 DIAGNOSIS — I10 HYPERTENSION, UNSPECIFIED TYPE: ICD-10-CM

## 2022-06-02 LAB
ALBUMIN SERPL-MCNC: 3.6 G/DL (ref 3.5–5)
ALBUMIN/GLOB SERPL: 0.9 {RATIO} (ref 1.1–2.2)
ALP SERPL-CCNC: 66 U/L (ref 45–117)
ALT SERPL-CCNC: 22 U/L (ref 12–78)
ANION GAP SERPL CALC-SCNC: 2 MMOL/L (ref 5–15)
APPEARANCE UR: CLEAR
AST SERPL-CCNC: 18 U/L (ref 15–37)
ATRIAL RATE: 59 BPM
ATRIAL RATE: 60 BPM
BACTERIA URNS QL MICRO: NEGATIVE /HPF
BASOPHILS # BLD: 0 K/UL (ref 0–0.1)
BASOPHILS NFR BLD: 1 % (ref 0–1)
BILIRUB SERPL-MCNC: 1.4 MG/DL (ref 0.2–1)
BILIRUB UR QL: NEGATIVE
BNP SERPL-MCNC: 166 PG/ML
BUN SERPL-MCNC: 28 MG/DL (ref 6–20)
BUN/CREAT SERPL: 28 (ref 12–20)
CALCIUM SERPL-MCNC: 9 MG/DL (ref 8.5–10.1)
CALCULATED P AXIS, ECG09: 27 DEGREES
CALCULATED P AXIS, ECG09: 52 DEGREES
CALCULATED R AXIS, ECG10: 1 DEGREES
CALCULATED R AXIS, ECG10: 9 DEGREES
CALCULATED T AXIS, ECG11: 22 DEGREES
CALCULATED T AXIS, ECG11: 23 DEGREES
CHLORIDE SERPL-SCNC: 108 MMOL/L (ref 97–108)
CO2 SERPL-SCNC: 29 MMOL/L (ref 21–32)
COLOR UR: ABNORMAL
CREAT SERPL-MCNC: 1 MG/DL (ref 0.55–1.02)
DIAGNOSIS, 93000: NORMAL
DIAGNOSIS, 93000: NORMAL
DIFFERENTIAL METHOD BLD: ABNORMAL
EOSINOPHIL # BLD: 0.3 K/UL (ref 0–0.4)
EOSINOPHIL NFR BLD: 6 % (ref 0–7)
EPITH CASTS URNS QL MICRO: ABNORMAL /LPF
ERYTHROCYTE [DISTWIDTH] IN BLOOD BY AUTOMATED COUNT: 13.2 % (ref 11.5–14.5)
GLOBULIN SER CALC-MCNC: 3.9 G/DL (ref 2–4)
GLUCOSE SERPL-MCNC: 92 MG/DL (ref 65–100)
GLUCOSE UR STRIP.AUTO-MCNC: NEGATIVE MG/DL
HCT VFR BLD AUTO: 41.2 % (ref 35–47)
HGB BLD-MCNC: 13.5 G/DL (ref 11.5–16)
HGB UR QL STRIP: ABNORMAL
IMM GRANULOCYTES # BLD AUTO: 0 K/UL (ref 0–0.04)
IMM GRANULOCYTES NFR BLD AUTO: 0 % (ref 0–0.5)
KETONES UR QL STRIP.AUTO: NEGATIVE MG/DL
LEUKOCYTE ESTERASE UR QL STRIP.AUTO: NEGATIVE
LYMPHOCYTES # BLD: 1 K/UL (ref 0.8–3.5)
LYMPHOCYTES NFR BLD: 25 % (ref 12–49)
MCH RBC QN AUTO: 32.5 PG (ref 26–34)
MCHC RBC AUTO-ENTMCNC: 32.8 G/DL (ref 30–36.5)
MCV RBC AUTO: 99.3 FL (ref 80–99)
MONOCYTES # BLD: 0.4 K/UL (ref 0–1)
MONOCYTES NFR BLD: 10 % (ref 5–13)
NEUTS SEG # BLD: 2.3 K/UL (ref 1.8–8)
NEUTS SEG NFR BLD: 58 % (ref 32–75)
NITRITE UR QL STRIP.AUTO: NEGATIVE
NRBC # BLD: 0 K/UL (ref 0–0.01)
NRBC BLD-RTO: 0 PER 100 WBC
P-R INTERVAL, ECG05: 156 MS
P-R INTERVAL, ECG05: 158 MS
PH UR STRIP: 7.5 [PH] (ref 5–8)
PLATELET # BLD AUTO: 138 K/UL (ref 150–400)
PMV BLD AUTO: 10.6 FL (ref 8.9–12.9)
POTASSIUM SERPL-SCNC: 3.9 MMOL/L (ref 3.5–5.1)
PROT SERPL-MCNC: 7.5 G/DL (ref 6.4–8.2)
PROT UR STRIP-MCNC: NEGATIVE MG/DL
Q-T INTERVAL, ECG07: 456 MS
Q-T INTERVAL, ECG07: 466 MS
QRS DURATION, ECG06: 82 MS
QRS DURATION, ECG06: 90 MS
QTC CALCULATION (BEZET), ECG08: 451 MS
QTC CALCULATION (BEZET), ECG08: 466 MS
RBC # BLD AUTO: 4.15 M/UL (ref 3.8–5.2)
RBC #/AREA URNS HPF: ABNORMAL /HPF (ref 0–5)
SODIUM SERPL-SCNC: 139 MMOL/L (ref 136–145)
SP GR UR REFRACTOMETRY: 1.01 (ref 1–1.03)
TROPONIN-HIGH SENSITIVITY: 13 NG/L (ref 0–51)
TROPONIN-HIGH SENSITIVITY: 14 NG/L (ref 0–51)
TSH SERPL DL<=0.05 MIU/L-ACNC: 0.96 UIU/ML (ref 0.36–3.74)
UA: UC IF INDICATED,UAUC: ABNORMAL
UROBILINOGEN UR QL STRIP.AUTO: 0.2 EU/DL (ref 0.2–1)
VENTRICULAR RATE, ECG03: 59 BPM
VENTRICULAR RATE, ECG03: 60 BPM
WBC # BLD AUTO: 3.9 K/UL (ref 3.6–11)
WBC URNS QL MICRO: ABNORMAL /HPF (ref 0–4)
YEAST URNS QL MICRO: PRESENT

## 2022-06-02 PROCEDURE — 83880 ASSAY OF NATRIURETIC PEPTIDE: CPT

## 2022-06-02 PROCEDURE — 71275 CT ANGIOGRAPHY CHEST: CPT

## 2022-06-02 PROCEDURE — 74011000636 HC RX REV CODE- 636: Performed by: EMERGENCY MEDICINE

## 2022-06-02 PROCEDURE — 84484 ASSAY OF TROPONIN QUANT: CPT

## 2022-06-02 PROCEDURE — 80053 COMPREHEN METABOLIC PANEL: CPT

## 2022-06-02 PROCEDURE — 99285 EMERGENCY DEPT VISIT HI MDM: CPT

## 2022-06-02 PROCEDURE — 70491 CT SOFT TISSUE NECK W/DYE: CPT

## 2022-06-02 PROCEDURE — 81001 URINALYSIS AUTO W/SCOPE: CPT

## 2022-06-02 PROCEDURE — 85025 COMPLETE CBC W/AUTO DIFF WBC: CPT

## 2022-06-02 PROCEDURE — 36415 COLL VENOUS BLD VENIPUNCTURE: CPT

## 2022-06-02 PROCEDURE — 93005 ELECTROCARDIOGRAM TRACING: CPT

## 2022-06-02 PROCEDURE — 84443 ASSAY THYROID STIM HORMONE: CPT

## 2022-06-02 PROCEDURE — 71045 X-RAY EXAM CHEST 1 VIEW: CPT

## 2022-06-02 RX ADMIN — IOPAMIDOL 100 ML: 755 INJECTION, SOLUTION INTRAVENOUS at 10:05

## 2022-06-02 NOTE — ED NOTES
Pt states she has felt short of breath starting this morning. Pt states that she feels like something is stuck in her throat.

## 2022-06-02 NOTE — ED PROVIDER NOTES
EMERGENCY DEPARTMENT HISTORY AND PHYSICAL EXAM      Date: 6/2/2022  Patient Name: Olimpia Garcia  Patient Age and Sex: 71 y.o. female     History of Presenting Illness     Chief Complaint   Patient presents with    Shortness of Breath     Pt arrives via wheelchair to triage, accompanied by daughter who reports hyptoension x multiple weeks that is being monitored by PCP. Daughter reports that a friend came over to their house this morning and took BP which was 70s/40s. Patient also reports chest tightness/SOB starting this morning. Hx of Parkinsons.  Hypotension     History Provided By: Patient    HPI: Olimpia Garcia is a 67-year-old history of Lewy body dementia with parkinsonism, chronic immobilization presenting with acute dyspnea and chest pain. Patient reports onset of chest tightness and dyspnea this morning. Chest tightness is intermittent, substernal nonradiating mild in nature. Dyspnea is persistent since she woke this morning. She reports chronic bilateral lower extremity swelling and cramping, left greater than right with no recent leg swelling. No associated nausea vomiting or diaphoresis. Patient has had a history of hypertension, has had persistent hypotension at home over the past week as low as 70s over 40s last night. Apparently 70s over 40s prior to transport today. She has taken midodrine in the past, is dealt with chronic hypotension for at least 4 years. Patient reports her neck felt mildly swollen this morning with some difficulty swallowing. There are no other complaints, changes, or physical findings at this time. PCP: Oralia Marroquin MD    No current facility-administered medications on file prior to encounter. Current Outpatient Medications on File Prior to Encounter   Medication Sig Dispense Refill    HYDROcodone-acetaminophen (NORCO) 5-325 mg per tablet Take 1 Tab by mouth every four (4) hours as needed for Pain. Max Daily Amount: 6 Tabs.  15 Tab 0       Past History     Past Medical History:  Past Medical History:   Diagnosis Date    Dementia ([facility] [address]     Depression     GERD (gastroesophageal reflux disease)     [facility] 2015     Past Surgical History:  History reviewed. No pertinent surgical history. Family History:  History reviewed. No pertinent family history. Social History:  Social History     Tobacco Use    Smoking status: Never Smoker    Smokeless tobacco: Never Used   Substance Use Topics    Alcohol use: Never    Drug use: Never       Allergies: Allergies   Allergen Reactions    Bactrim [Sulfamethoprim] Other (comments)     Vision change    Sulfa (Sulfonamide Antibiotics) Other (comments)         Review of Systems   Review of Systems   Constitutional: Negative for chills and fever. HENT: Negative for congestion and rhinorrhea. Respiratory: Positive for shortness of breath. Cardiovascular: Positive for chest pain. Gastrointestinal: Negative for abdominal pain, nausea and vomiting. Genitourinary: Negative for dysuria and frequency. Musculoskeletal: Negative for myalgias. All other systems reviewed and are negative. Physical Exam   Physical Exam  Vitals and nursing note reviewed. Constitutional:       General: She is not in acute distress. Appearance: Normal appearance. She is not ill-appearing. HENT:      Head: Normocephalic. Mouth/Throat:      Mouth: Mucous membranes are moist.      Comments: No visualized swelling to neck, no posterior oropharyngeal swelling. No palpable goiter  Eyes:      Conjunctiva/sclera: Conjunctivae normal.   Cardiovascular:      Rate and Rhythm: Normal rate and regular rhythm. Pulses: Normal pulses. Pulmonary:      Effort: Pulmonary effort is normal.      Breath sounds: Normal breath sounds. Abdominal:      General: Abdomen is flat. Palpations: Abdomen is soft. Musculoskeletal:         General: No deformity. Skin:     General: Skin is warm and dry. Neurological:      Mental Status: She is alert and oriented to person, place, and time. Mental status is at baseline. Comments: Pill-rolling tremor bilateral upper extremities   Psychiatric:         Behavior: Behavior normal.         Thought Content: Thought content normal.        Diagnostic Study Results     Labs -     Recent Results (from the past 12 hour(s))   CBC WITH AUTOMATED DIFF    Collection Time: 06/02/22  8:52 AM   Result Value Ref Range    WBC 3.9 3.6 - 11.0 K/uL    RBC 4.15 3.80 - 5.20 M/uL    HGB 13.5 11.5 - 16.0 g/dL    HCT 41.2 35.0 - 47.0 %    MCV 99.3 (H) 80.0 - 99.0 FL    MCH 32.5 26.0 - 34.0 PG    MCHC 32.8 30.0 - 36.5 g/dL    RDW 13.2 11.5 - 14.5 %    PLATELET 267 (L) 815 - 400 K/uL    MPV 10.6 8.9 - 12.9 FL    NRBC 0.0 0  WBC    ABSOLUTE NRBC 0.00 0.00 - 0.01 K/uL    NEUTROPHILS 58 32 - 75 %    LYMPHOCYTES 25 12 - 49 %    MONOCYTES 10 5 - 13 %    EOSINOPHILS 6 0 - 7 %    BASOPHILS 1 0 - 1 %    IMMATURE GRANULOCYTES 0 0.0 - 0.5 %    ABS. NEUTROPHILS 2.3 1.8 - 8.0 K/UL    ABS. LYMPHOCYTES 1.0 0.8 - 3.5 K/UL    ABS. MONOCYTES 0.4 0.0 - 1.0 K/UL    ABS. EOSINOPHILS 0.3 0.0 - 0.4 K/UL    ABS. BASOPHILS 0.0 0.0 - 0.1 K/UL    ABS. IMM. GRANS. 0.0 0.00 - 0.04 K/UL    DF AUTOMATED     METABOLIC PANEL, COMPREHENSIVE    Collection Time: 06/02/22  8:52 AM   Result Value Ref Range    Sodium 139 136 - 145 mmol/L    Potassium 3.9 3.5 - 5.1 mmol/L    Chloride 108 97 - 108 mmol/L    CO2 29 21 - 32 mmol/L    Anion gap 2 (L) 5 - 15 mmol/L    Glucose 92 65 - 100 mg/dL    BUN 28 (H) 6 - 20 MG/DL    Creatinine 1.00 0.55 - 1.02 MG/DL    BUN/Creatinine ratio 28 (H) 12 - 20      GFR est AA >60 >60 ml/min/1.73m2    GFR est non-AA 55 (L) >60 ml/min/1.73m2    Calcium 9.0 8.5 - 10.1 MG/DL    Bilirubin, total 1.4 (H) 0.2 - 1.0 MG/DL    ALT (SGPT) 22 12 - 78 U/L    AST (SGOT) 18 15 - 37 U/L    Alk.  phosphatase 66 45 - 117 U/L    Protein, total 7.5 6.4 - 8.2 g/dL    Albumin 3.6 3.5 - 5.0 g/dL    Globulin 3.9 2.0 - 4.0 g/dL    A-G Ratio 0.9 (L) 1.1 - 2.2     TROPONIN-HIGH SENSITIVITY    Collection Time: 06/02/22  8:52 AM   Result Value Ref Range    Troponin-High Sensitivity 14 0 - 51 ng/L   NT-PRO BNP    Collection Time: 06/02/22  8:52 AM   Result Value Ref Range    NT pro- (H) <125 PG/ML   EKG, 12 LEAD, INITIAL    Collection Time: 06/02/22  8:53 AM   Result Value Ref Range    Ventricular Rate 60 BPM    Atrial Rate 60 BPM    P-R Interval 156 ms    QRS Duration 82 ms    Q-T Interval 466 ms    QTC Calculation (Bezet) 466 ms    Calculated P Axis 27 degrees    Calculated R Axis 9 degrees    Calculated T Axis 23 degrees    Diagnosis       Normal sinus rhythm  Normal ECG  When compared with ECG of 12-FEB-2019 18:32,  Previous ECG has undetermined rhythm, needs review  ST now depressed in Inferior leads  Nonspecific T wave abnormality no longer evident in Lateral leads     TSH 3RD GENERATION    Collection Time: 06/02/22  9:10 AM   Result Value Ref Range    TSH 0.96 0.36 - 3.74 uIU/mL   EKG, 12 LEAD, INITIAL    Collection Time: 06/02/22  9:37 AM   Result Value Ref Range    Ventricular Rate 59 BPM    Atrial Rate 59 BPM    P-R Interval 158 ms    QRS Duration 90 ms    Q-T Interval 456 ms    QTC Calculation (Bezet) 451 ms    Calculated P Axis 52 degrees    Calculated R Axis 1 degrees    Calculated T Axis 22 degrees    Diagnosis       Sinus bradycardia  Minimal voltage criteria for LVH, may be normal variant  Septal infarct , age undetermined  When compared with ECG of 02-JUN-2022 08:53,  MANUAL COMPARISON REQUIRED, DATA IS UNCONFIRMED     TROPONIN-HIGH SENSITIVITY    Collection Time: 06/02/22 10:29 AM   Result Value Ref Range    Troponin-High Sensitivity 13 0 - 51 ng/L   URINALYSIS W/ REFLEX CULTURE    Collection Time: 06/02/22  1:53 PM    Specimen: Urine   Result Value Ref Range    Color YELLOW/STRAW      Appearance CLEAR CLEAR      Specific gravity 1.010 1.003 - 1.030      pH (UA) 7.5 5.0 - 8.0      Protein Negative NEG mg/dL Glucose Negative NEG mg/dL    Ketone Negative NEG mg/dL    Bilirubin Negative NEG      Blood TRACE (A) NEG      Urobilinogen 0.2 0.2 - 1.0 EU/dL    Nitrites Negative NEG      Leukocyte Esterase Negative NEG      WBC 0-4 0 - 4 /hpf    RBC 5-10 0 - 5 /hpf    Epithelial cells MODERATE (A) FEW /lpf    Bacteria Negative NEG /hpf    UA:UC IF INDICATED CULTURE NOT INDICATED BY UA RESULT CNI      Yeast PRESENT (A) NEG       Radiologic Studies  XR CHEST PORT   Final Result   No acute process. CT NECK SOFT TISSUE W CONT   Final Result   1. No acute vascular abnormality, no pulmonary embolism. No acute airspace   disease. 2. In the left supraclavicular/infrahyoid region there is nonspecific soft   tissue swelling without associated lymphadenopathy, no obvious trauma. CTA CHEST W OR W WO CONT   Final Result   1. No acute vascular abnormality, no pulmonary embolism. No acute airspace   disease. 2. In the left supraclavicular/infrahyoid region there is nonspecific soft   tissue swelling without associated lymphadenopathy, no obvious trauma. CT Results  (Last 48 hours)               06/02/22 1005  CT NECK SOFT TISSUE W CONT Final result    Impression:  1. No acute vascular abnormality, no pulmonary embolism. No acute airspace   disease. 2. In the left supraclavicular/infrahyoid region there is nonspecific soft   tissue swelling without associated lymphadenopathy, no obvious trauma. Narrative:  EXAM:  CTA CHEST W OR W WO CONT, CT NECK SOFT TISSUE W CONT       INDICATION: Neck swelling. Dyspnea. COMPARISON: None. TECHNIQUE: Helical thin section chest CT following uneventful intravenous   administration of nonionic contrast mL of isovue 370 according to departmental   PE protocol. CT neck was then obtained, from the upper chest to the skull base. Coronal and sagittal reformats were performed. 3D post processing was performed.     CT dose reduction was achieved through the use of a standardized protocol   tailored for this examination and automatic exposure control for dose   modulation. FINDINGS:    Neck:   NASOPHARYNX: Normal.   SUPRAHYOID NECK: Normal oropharynx, oral cavity, parapharyngeal space, and   retropharyngeal space. INFRAHYOID NECK: Normal larynx, hypopharynx and supraglottis. In the left lower   neck, supraclavicular, infrahyoid region, there is nonspecific soft tissue   swelling posterior to the left internal jugular vein (4-77), no associated   lymphadenopathy. No fluid collection. PAROTID GLANDS: Normal.   SUBMANDIBULAR GLANDS: Normal.   THYROID GLAND: Normal. No nodule. LYMPH NODES: None enlarged by CT size criteria . LUNG APICES: Clear. OSSEOUS STRUCTURES: No destructive bone lesion. ADDITIONAL COMMENTS: N/A. Chest:   This is a good quality study for the evaluation of pulmonary embolism to the   first subsegmental arterial level. There is no pulmonary embolism to this level. THYROID: No nodule. MEDIASTINUM: No mass or lymphadenopathy. VINCENT: No mass or lymphadenopathy. THORACIC AORTA: No aneurysm. HEART: Borderline cardiomegaly. . Contrast reflux into the IVC and hepatic veins. ESOPHAGUS: No wall thickening or dilatation. TRACHEA/BRONCHI: Patent. PLEURA: No effusion or pneumothorax. LUNGS: No nodule, mass, or airspace disease. UPPER ABDOMEN: Partially imaged. No acute pathology. Status post   cholecystectomy. BONES: No aggressive bone lesion or fracture. Remote sternal fracture. 06/02/22 1005  CTA [facility] CONT Final result    Impression:  1. No acute vascular abnormality, no pulmonary embolism. No acute airspace   disease. 2. In the left supraclavicular/infrahyoid region there is nonspecific soft   tissue swelling without associated lymphadenopathy, no obvious trauma.                Narrative:  EXAM:  CTA CHEST W OR W WO CONT, CT NECK SOFT TISSUE W CONT       INDICATION: Neck swelling. Dyspnea. COMPARISON: None. TECHNIQUE: Helical thin section chest CT following uneventful intravenous   administration of nonionic contrast mL of isovue 370 according to departmental   PE protocol. CT neck was then obtained, from the upper chest to the skull base. Coronal and sagittal reformats were performed. 3D post processing was performed. CT dose reduction was achieved through the use of a standardized protocol   tailored for this examination and automatic exposure control for dose   modulation. FINDINGS:    Neck:   NASOPHARYNX: Normal.   SUPRAHYOID NECK: Normal oropharynx, oral cavity, parapharyngeal space, and   retropharyngeal space. INFRAHYOID NECK: Normal larynx, hypopharynx and supraglottis. In the left lower   neck, supraclavicular, infrahyoid region, there is nonspecific soft tissue   swelling posterior to the left internal jugular vein (4-77), no associated   lymphadenopathy. No fluid collection. PAROTID GLANDS: Normal.   SUBMANDIBULAR GLANDS: Normal.   THYROID GLAND: Normal. No nodule. LYMPH NODES: None enlarged by CT size criteria . LUNG APICES: Clear. OSSEOUS STRUCTURES: No destructive bone lesion. ADDITIONAL COMMENTS: N/A. Chest:   This is a good quality study for the evaluation of pulmonary embolism to the   first subsegmental arterial level. There is no pulmonary embolism to this level. THYROID: No nodule. MEDIASTINUM: No mass or lymphadenopathy. VINCENT: No mass or lymphadenopathy. THORACIC AORTA: No aneurysm. HEART: Borderline cardiomegaly. . Contrast reflux into the IVC and hepatic veins. ESOPHAGUS: No wall thickening or dilatation. TRACHEA/BRONCHI: Patent. PLEURA: No effusion or pneumothorax. LUNGS: No nodule, mass, or airspace disease. UPPER ABDOMEN: Partially imaged. No acute pathology. Status post   cholecystectomy. BONES: No aggressive bone lesion or fracture. Remote sternal fracture.                CXR Results (Last 48 hours)               06/02/22 1049  XR CHEST PORT Final result    Impression:  No acute process. Narrative: Indication: Shortness of breath       Comparison: None       Portable exam of the chest obtained at 1045 demonstrates normal heart size. There is no acute process in the lung fields. The osseous structures are   unremarkable. Medical Decision Making   I am the first provider for this patient. I reviewed the vital signs, available nursing notes, past medical history, past surgical history, family history and social history. Vital Signs-Reviewed the patient's vital signs. Patient Vitals for the past 12 hrs:   Temp Pulse Resp BP SpO2   06/02/22 1030 -- 66 -- (!) 146/94 --   06/02/22 0932 -- (!) 57 17 (!) 152/74 --   06/02/22 0832 98 °F (36.7 °C) 78 20 139/83 95 %       Records Reviewed: Nursing Notes and Old Medical Records    Provider Notes (Medical Decision Making):   DDx is broad and includes PE, neck mass or cervical LAD, dysrhythmia    Will obtain CT neck, CTA chest, CBC, CMP, EKG and troponin to evaluate for ischemia. Will obtain orthostatic VS. Normotensive on arrival to the ED. Possible dysautonomia in setting of known Parkinsonism. ED Course:   Initial assessment performed. The patients presenting problems have been discussed, and they are in agreement with the care plan formulated and outlined with them. I have encouraged them to ask questions as they arise throughout their visit. ED Course as of 06/02/22 1504   Thu Jun 02, 2022   0918 EKG is normal sinus rhythm rate of 60 normal axis normal intervals [WB]   0918 Patient moderate risk for PE will obtain CTA chest [WB]   0936 Patient with transient significant tachycardia on the monitor up to 150, in and out of sinus and what appears to be atrial flutter on the monitor. This will be a new diagnosis.   Will obtain EKG during episode of arrhythmia [WB]   1002 Troponin 14, will trend [WB]   1002 Hemodynamics remained stable [WB]   1008 CT scans performed [WB]   1021 TSH is normal,  BNP normal electrolytes and renal function normal LFTs, CBC mild thrombocytopenia 138 [WB]   1038 Repeat EKG demonstrates atrial flutter with a ventricular rate of 59, 3 or 4-1 [WB]   1125 CTA shows no vascular abnormality no PE and no acute airspace disease. There is a nonspecific soft tissue swelling in the supraclavicular infrahyoid region which is nonspecific. No lymphadenopathy no fluid collection with a normal larynx hypopharynx and supraglottis [WB]   1154 Upon further review of EKG, suspect artifact from tremors, likely still sinus rhythm [WB]   1209 Patient without orthostatic vital signs, blood pressure going up and down is likely related to dysautonomia in setting of Parkinsonism. In setting of prior will discharge with PCP follow-up and ENT follow-up for neck mass [WB]   1335 Patient's family requesting urinalysis, will obtain catheterized urine specimen [WB]      ED Course User Index  [WB] Ramona Boles MD     Disposition:  Discharge Note:  The patient has been re-evaluated and is ready for discharge. Reviewed available results with patient. Counseled patient on diagnosis and care plan. Patient has expressed understanding, and all questions have been answered. Patient agrees with plan and agrees to follow up as recommended, or to return to the ED if their symptoms worsen. Discharge instructions have been provided and explained to the patient, along with reasons to return to the ED. PLAN:  Discharge Medication List as of 6/2/2022 12:14 PM        2.    Follow-up Information     Follow up With Specialties Details Why Contact Info    Ranjan Hightower MD Family Medicine  Discuss adjustment of blood pressure medicines [address] [place] [address]      Pradeep Kitchen MD Otolaryngology Call  Neck mass [address]  [address] [place] [address] 83.  [phone] 3.  Return to ED if worse     Diagnosis     Clinical Impression:   1. Neck mass    2. Hypertension, unspecified type        Attestations:    Hannah Valenzuela M.D. Please note that this dictation was completed with [employer], the computer voice recognition software. Quite often unanticipated grammatical, syntax, homophones, and other interpretive errors are inadvertently transcribed by the computer software. Please disregard these errors. Please excuse any errors that have escaped final proofreading. Thank you.

## 2022-06-02 NOTE — DISCHARGE INSTRUCTIONS
Wax and wane blood pressures likely related to dysautonomia in setting of known parkinsonism. It is still worth following up with your primary care physician for further tests, guidance in management. Please follow-up with provided ENT physician regarding neck mass found on CT imaging.

## 2023-04-17 ENCOUNTER — HOSPITAL ENCOUNTER (INPATIENT)
Age: 73
LOS: 2 days | Discharge: NURSING, REHAB OR LONG-TERM CARE | DRG: 689 | End: 2023-04-21
Attending: EMERGENCY MEDICINE | Admitting: FAMILY MEDICINE
Payer: MEDICARE

## 2023-04-17 ENCOUNTER — APPOINTMENT (OUTPATIENT)
Dept: CT IMAGING | Age: 73
DRG: 689 | End: 2023-04-17
Attending: EMERGENCY MEDICINE
Payer: MEDICARE

## 2023-04-17 ENCOUNTER — APPOINTMENT (OUTPATIENT)
Dept: GENERAL RADIOLOGY | Age: 73
DRG: 689 | End: 2023-04-17
Attending: EMERGENCY MEDICINE
Payer: MEDICARE

## 2023-04-17 DIAGNOSIS — G93.40 ACUTE ENCEPHALOPATHY: ICD-10-CM

## 2023-04-17 DIAGNOSIS — R47.89 EPISODE OF CHANGE IN SPEECH: Primary | ICD-10-CM

## 2023-04-17 PROBLEM — R41.82 AMS (ALTERED MENTAL STATUS): Status: ACTIVE | Noted: 2023-04-17

## 2023-04-17 LAB
ANION GAP SERPL CALC-SCNC: 5 MMOL/L (ref 5–15)
APPEARANCE UR: CLEAR
BACTERIA URNS QL MICRO: NEGATIVE /HPF
BASOPHILS # BLD: 0 K/UL (ref 0–0.1)
BASOPHILS NFR BLD: 0 % (ref 0–1)
BILIRUB UR QL: NEGATIVE
BUN SERPL-MCNC: 25 MG/DL (ref 6–20)
BUN/CREAT SERPL: 26 (ref 12–20)
CALCIUM SERPL-MCNC: 9.1 MG/DL (ref 8.5–10.1)
CHLORIDE SERPL-SCNC: 107 MMOL/L (ref 97–108)
CO2 SERPL-SCNC: 29 MMOL/L (ref 21–32)
COLOR UR: ABNORMAL
CREAT SERPL-MCNC: 0.97 MG/DL (ref 0.55–1.02)
DIFFERENTIAL METHOD BLD: ABNORMAL
EOSINOPHIL # BLD: 0.1 K/UL (ref 0–0.4)
EOSINOPHIL NFR BLD: 1 % (ref 0–7)
EPITH CASTS URNS QL MICRO: ABNORMAL /LPF
ERYTHROCYTE [DISTWIDTH] IN BLOOD BY AUTOMATED COUNT: 13.4 % (ref 11.5–14.5)
GLUCOSE BLD STRIP.AUTO-MCNC: 104 MG/DL (ref 65–117)
GLUCOSE SERPL-MCNC: 101 MG/DL (ref 65–100)
GLUCOSE UR STRIP.AUTO-MCNC: NEGATIVE MG/DL
HCT VFR BLD AUTO: 41.5 % (ref 35–47)
HGB BLD-MCNC: 13.6 G/DL (ref 11.5–16)
HGB UR QL STRIP: NEGATIVE
HYALINE CASTS URNS QL MICRO: ABNORMAL /LPF (ref 0–5)
IMM GRANULOCYTES # BLD AUTO: 0 K/UL (ref 0–0.04)
IMM GRANULOCYTES NFR BLD AUTO: 0 % (ref 0–0.5)
KETONES UR QL STRIP.AUTO: 15 MG/DL
LEUKOCYTE ESTERASE UR QL STRIP.AUTO: NEGATIVE
LYMPHOCYTES # BLD: 1.3 K/UL (ref 0.8–3.5)
LYMPHOCYTES NFR BLD: 16 % (ref 12–49)
MCH RBC QN AUTO: 32.5 PG (ref 26–34)
MCHC RBC AUTO-ENTMCNC: 32.8 G/DL (ref 30–36.5)
MCV RBC AUTO: 99.3 FL (ref 80–99)
MONOCYTES # BLD: 1 K/UL (ref 0–1)
MONOCYTES NFR BLD: 12 % (ref 5–13)
NEUTS SEG # BLD: 5.8 K/UL (ref 1.8–8)
NEUTS SEG NFR BLD: 71 % (ref 32–75)
NITRITE UR QL STRIP.AUTO: NEGATIVE
NRBC # BLD: 0 K/UL (ref 0–0.01)
NRBC BLD-RTO: 0 PER 100 WBC
PH UR STRIP: 5.5 (ref 5–8)
PLATELET # BLD AUTO: 147 K/UL (ref 150–400)
PMV BLD AUTO: 11.2 FL (ref 8.9–12.9)
POTASSIUM SERPL-SCNC: 3.8 MMOL/L (ref 3.5–5.1)
PROT UR STRIP-MCNC: ABNORMAL MG/DL
RBC # BLD AUTO: 4.18 M/UL (ref 3.8–5.2)
RBC #/AREA URNS HPF: ABNORMAL /HPF (ref 0–5)
SERVICE CMNT-IMP: NORMAL
SODIUM SERPL-SCNC: 141 MMOL/L (ref 136–145)
SP GR UR REFRACTOMETRY: 1.03 (ref 1–1.03)
TROPONIN I SERPL HS-MCNC: 13 NG/L (ref 0–37)
UA: UC IF INDICATED,UAUC: ABNORMAL
UROBILINOGEN UR QL STRIP.AUTO: 0.2 EU/DL (ref 0.2–1)
WBC # BLD AUTO: 8.2 K/UL (ref 3.6–11)
WBC URNS QL MICRO: ABNORMAL /HPF (ref 0–4)

## 2023-04-17 PROCEDURE — 99285 EMERGENCY DEPT VISIT HI MDM: CPT

## 2023-04-17 PROCEDURE — G0378 HOSPITAL OBSERVATION PER HR: HCPCS

## 2023-04-17 PROCEDURE — 74011250637 HC RX REV CODE- 250/637: Performed by: EMERGENCY MEDICINE

## 2023-04-17 PROCEDURE — 70450 CT HEAD/BRAIN W/O DYE: CPT

## 2023-04-17 PROCEDURE — 82962 GLUCOSE BLOOD TEST: CPT

## 2023-04-17 PROCEDURE — 80048 BASIC METABOLIC PNL TOTAL CA: CPT

## 2023-04-17 PROCEDURE — 85025 COMPLETE CBC W/AUTO DIFF WBC: CPT

## 2023-04-17 PROCEDURE — 36415 COLL VENOUS BLD VENIPUNCTURE: CPT

## 2023-04-17 PROCEDURE — 84484 ASSAY OF TROPONIN QUANT: CPT

## 2023-04-17 PROCEDURE — 71045 X-RAY EXAM CHEST 1 VIEW: CPT

## 2023-04-17 PROCEDURE — 81001 URINALYSIS AUTO W/SCOPE: CPT

## 2023-04-17 RX ORDER — PANTOPRAZOLE SODIUM 40 MG/1
40 TABLET, DELAYED RELEASE ORAL
Status: DISCONTINUED | OUTPATIENT
Start: 2023-04-17 | End: 2023-04-21 | Stop reason: HOSPADM

## 2023-04-17 RX ORDER — ONDANSETRON 4 MG/1
4 TABLET, ORALLY DISINTEGRATING ORAL
Status: DISCONTINUED | OUTPATIENT
Start: 2023-04-17 | End: 2023-04-21 | Stop reason: HOSPADM

## 2023-04-17 RX ORDER — POTASSIUM CHLORIDE 20 MEQ/1
20 TABLET, EXTENDED RELEASE ORAL DAILY
COMMUNITY
Start: 2023-04-13

## 2023-04-17 RX ORDER — ONDANSETRON 2 MG/ML
4 INJECTION INTRAMUSCULAR; INTRAVENOUS
Status: DISCONTINUED | OUTPATIENT
Start: 2023-04-17 | End: 2023-04-21 | Stop reason: HOSPADM

## 2023-04-17 RX ORDER — GUAIFENESIN 100 MG/5ML
81 LIQUID (ML) ORAL DAILY
COMMUNITY
End: 2023-04-18

## 2023-04-17 RX ORDER — FLUDROCORTISONE ACETATE 0.1 MG/1
0.1 TABLET ORAL DAILY
Status: DISCONTINUED | OUTPATIENT
Start: 2023-04-18 | End: 2023-04-18

## 2023-04-17 RX ORDER — SODIUM CHLORIDE 0.9 % (FLUSH) 0.9 %
5-40 SYRINGE (ML) INJECTION EVERY 8 HOURS
Status: DISCONTINUED | OUTPATIENT
Start: 2023-04-17 | End: 2023-04-21 | Stop reason: HOSPADM

## 2023-04-17 RX ORDER — DONEPEZIL HYDROCHLORIDE 10 MG/1
10 TABLET, FILM COATED ORAL DAILY
COMMUNITY
Start: 2022-12-30

## 2023-04-17 RX ORDER — ACETAMINOPHEN 650 MG/1
650 SUPPOSITORY RECTAL
Status: DISCONTINUED | OUTPATIENT
Start: 2023-04-17 | End: 2023-04-21 | Stop reason: HOSPADM

## 2023-04-17 RX ORDER — FLUDROCORTISONE ACETATE 0.1 MG/1
0.05 TABLET ORAL 2 TIMES DAILY
COMMUNITY
Start: 2023-04-01

## 2023-04-17 RX ORDER — CARBIDOPA AND LEVODOPA 25; 100 MG/1; MG/1
1 TABLET, EXTENDED RELEASE ORAL 3 TIMES DAILY
Status: DISCONTINUED | OUTPATIENT
Start: 2023-04-18 | End: 2023-04-21 | Stop reason: HOSPADM

## 2023-04-17 RX ORDER — CARVEDILOL 3.12 MG/1
3.12 TABLET ORAL 2 TIMES DAILY WITH MEALS
Status: DISCONTINUED | OUTPATIENT
Start: 2023-04-17 | End: 2023-04-21 | Stop reason: HOSPADM

## 2023-04-17 RX ORDER — PIMAVANSERIN TARTRATE 34 MG/1
34 CAPSULE ORAL EVERY EVENING
COMMUNITY
Start: 2023-02-16

## 2023-04-17 RX ORDER — SODIUM CHLORIDE 9 MG/ML
100 INJECTION, SOLUTION INTRAVENOUS CONTINUOUS
Status: DISCONTINUED | OUTPATIENT
Start: 2023-04-17 | End: 2023-04-21 | Stop reason: HOSPADM

## 2023-04-17 RX ORDER — GUAIFENESIN 100 MG/5ML
81 LIQUID (ML) ORAL DAILY
Status: DISCONTINUED | OUTPATIENT
Start: 2023-04-18 | End: 2023-04-18

## 2023-04-17 RX ORDER — QUETIAPINE FUMARATE 25 MG/1
50 TABLET, FILM COATED ORAL
COMMUNITY
Start: 2023-03-24

## 2023-04-17 RX ORDER — CARBIDOPA AND LEVODOPA 25; 100 MG/1; MG/1
1 TABLET, EXTENDED RELEASE ORAL 3 TIMES DAILY
COMMUNITY
Start: 2023-02-20

## 2023-04-17 RX ORDER — OMEPRAZOLE 20 MG/1
20 CAPSULE, DELAYED RELEASE ORAL EVERY OTHER DAY
COMMUNITY
Start: 2023-03-06

## 2023-04-17 RX ORDER — APIXABAN 5 MG/1
5 TABLET, FILM COATED ORAL 2 TIMES DAILY
COMMUNITY
Start: 2023-02-15

## 2023-04-17 RX ORDER — DONEPEZIL HYDROCHLORIDE 10 MG/1
20 TABLET, FILM COATED ORAL DAILY
Status: DISCONTINUED | OUTPATIENT
Start: 2023-04-18 | End: 2023-04-18

## 2023-04-17 RX ORDER — VIBEGRON 75 MG/1
75 TABLET, FILM COATED ORAL DAILY
COMMUNITY
Start: 2023-02-26

## 2023-04-17 RX ORDER — SODIUM CHLORIDE 0.9 % (FLUSH) 0.9 %
5-40 SYRINGE (ML) INJECTION AS NEEDED
Status: DISCONTINUED | OUTPATIENT
Start: 2023-04-17 | End: 2023-04-21 | Stop reason: HOSPADM

## 2023-04-17 RX ORDER — GUAIFENESIN 100 MG/5ML
324 LIQUID (ML) ORAL
Status: COMPLETED | OUTPATIENT
Start: 2023-04-17 | End: 2023-04-17

## 2023-04-17 RX ORDER — QUETIAPINE FUMARATE 25 MG/1
25 TABLET, FILM COATED ORAL
Status: DISCONTINUED | OUTPATIENT
Start: 2023-04-17 | End: 2023-04-18

## 2023-04-17 RX ORDER — ACETAMINOPHEN 325 MG/1
650 TABLET ORAL
Status: DISCONTINUED | OUTPATIENT
Start: 2023-04-17 | End: 2023-04-21 | Stop reason: HOSPADM

## 2023-04-17 RX ORDER — CARVEDILOL 3.12 MG/1
3.12 TABLET ORAL 2 TIMES DAILY WITH MEALS
COMMUNITY
Start: 2023-04-11

## 2023-04-17 RX ORDER — POLYETHYLENE GLYCOL 3350 17 G/17G
17 POWDER, FOR SOLUTION ORAL DAILY PRN
Status: DISCONTINUED | OUTPATIENT
Start: 2023-04-17 | End: 2023-04-21 | Stop reason: HOSPADM

## 2023-04-17 RX ORDER — TROSPIUM CHLORIDE ER 60 MG/1
60 CAPSULE ORAL EVERY EVENING
COMMUNITY
Start: 2023-01-09

## 2023-04-17 RX ADMIN — ASPIRIN 81 MG CHEWABLE TABLET 324 MG: 81 TABLET CHEWABLE at 21:41

## 2023-04-17 NOTE — ED TRIAGE NOTES
Pt comes to ED from urology with reports of generalized weakness since she woke this morning. LKW was 2100 last night. Daughter in triage reports pt normally can walk with a walker but today she has been wheelchair bound. Pt went to urologist today due to recurrent UTIs. Daughter states urologist is concerned pt has had stroke and told them to come straight to the ED. Pt is A&O x4. Daughter reports speech is delayed. Pt is able to raise both arms equally. Code stoke level 2 van negative called.

## 2023-04-17 NOTE — ED PROVIDER NOTES
73F w/ hx lewy body dementia, PD, depression p/w 1d generalized weakness and speech changes. Pt here w/ daughter w/ whom she lives. Pt is a limited historian. Per daughter, has had speech changes describes as word finding difficulty since yesterday afternoon and was last at her baseline yesterday morning. Also has frequent episodes of MS changes that daughter attributes to UTI) and has also been more confused and disoriented than usual since yesterday when previously able to have a clear conversation. Having loose nonbloody stools and cough. Also generalized whole body weakness and fatigue w/ total body pain per daughter. However, no focal ext weakness or vision changes. No recent falls or trauma. No F/C, dyspnea, N/V. No drugs/etoh or new/changes to meds. Past Medical History:   Diagnosis Date    Dementia ([facility] [address]     Depression     GERD (gastroesophageal reflux disease)     Parkinson disease ([facility] [address] 2015       No past surgical history on file. History reviewed. No pertinent family history.     Social History     Socioeconomic History    Marital status:      Spouse name: Not on file    Number of children: Not on file    Years of education: Not on file    Highest education level: Not on file   Occupational History    Not on file   Tobacco Use    Smoking status: Never    Smokeless tobacco: Never   Substance and Sexual Activity    Alcohol use: Never    Drug use: Never    Sexual activity: Not on file   Other Topics Concern    Not on file   Social History Narrative    Not on file     Social Determinants of Health     Financial Resource Strain: Not on file   Food Insecurity: Not on file   Transportation Needs: Not on file   Physical Activity: Not on file   Stress: Not on file   Social Connections: Not on file   Intimate Partner Violence: Not on file   Housing Stability: Not on file         ALLERGIES: Bactrim [sulfamethoprim] and Sulfa (sulfonamide antibiotics)    Review of Systems   Unable to perform ROS: Dementia     Vitals:    04/17/23 1502   BP: 137/73   Pulse: (!) 59   Resp: 20   Temp: 97.8 °F (36.6 °C)   SpO2: 94%            Physical Exam  Vitals and nursing note reviewed. Constitutional:       General: She is not in acute distress. Appearance: Normal appearance. She is not ill-appearing or toxic-appearing. HENT:      Head: Normocephalic and atraumatic. Right Ear: External ear normal.      Left Ear: External ear normal.      Nose: Nose normal.      Mouth/Throat:      Mouth: Mucous membranes are moist.      Pharynx: Oropharynx is clear. No oropharyngeal exudate or posterior oropharyngeal erythema. Eyes:      General: No scleral icterus. Extraocular Movements: Extraocular movements intact. Conjunctiva/sclera: Conjunctivae normal.      Pupils: Pupils are equal, round, and reactive to light. Cardiovascular:      Rate and Rhythm: Regular rhythm. Bradycardia present. Pulses: Normal pulses. Heart sounds: Normal heart sounds. No murmur heard. No friction rub. No gallop. Pulmonary:      Effort: Pulmonary effort is normal. No respiratory distress. Breath sounds: Normal breath sounds. No stridor. No wheezing, rhonchi or rales. Abdominal:      General: There is no distension. Palpations: Abdomen is soft. Tenderness: There is no abdominal tenderness. There is no guarding or rebound. Musculoskeletal:         General: No tenderness or deformity. Normal range of motion. Cervical back: Normal range of motion and neck supple. No rigidity. Right lower leg: No edema. Left lower leg: No edema. Skin:     General: Skin is warm. Capillary Refill: Capillary refill takes less than 2 seconds. Coloration: Skin is not jaundiced. Neurological:      General: No focal deficit present. Mental Status: She is alert. Cranial Nerves: No cranial nerve deficit. Sensory: No sensory deficit. Motor: No weakness.       Coordination: Coordination normal.   Psychiatric:         Mood and Affect: Mood normal.         Behavior: Behavior normal.         Thought Content: Thought content normal.         Judgment: Judgment normal.      I personally reviewed and independently interpreted EKG, labs and imaging results. LABORATORY TESTS:  Admission on 04/17/2023   Component Date Value Ref Range Status    Glucose (POC) 04/17/2023 104  65 - 117 mg/dL Final    Comment: (NOTE)  The FDA has indicated that no capillary point of care blood glucose  monitoring systems are approved for use in \"critically ill\" patients,  however they have not defined this population. The College of  American Pathologists has recommended that these devices should not  be used in cases such as severe hypotension, dehydration, shock, and  hyperglycemic-hyperosmolar state, amongst others. Venous or arterial  collection is the recommended specimen for testing these patients. Performed by 04/17/2023 Darell Antonio   Final    WBC 04/17/2023 8.2  3.6 - 11.0 K/uL Final    RBC 04/17/2023 4.18  3.80 - 5.20 M/uL Final    HGB 04/17/2023 13.6  11.5 - 16.0 g/dL Final    HCT 04/17/2023 41.5  35.0 - 47.0 % Final    MCV 04/17/2023 99.3 (H)  80.0 - 99.0 FL Final    MCH 04/17/2023 32.5  26.0 - 34.0 PG Final    MCHC 04/17/2023 32.8  30.0 - 36.5 g/dL Final    RDW 04/17/2023 13.4  11.5 - 14.5 % Final    PLATELET 09/03/7467 311 (L)  150 - 400 K/uL Final    MPV 04/17/2023 11.2  8.9 - 12.9 FL Final    NRBC 04/17/2023 0.0  0  WBC Final    ABSOLUTE NRBC 04/17/2023 0.00  0.00 - 0.01 K/uL Final    NEUTROPHILS 04/17/2023 71  32 - 75 % Final    LYMPHOCYTES 04/17/2023 16  12 - 49 % Final    MONOCYTES 04/17/2023 12  5 - 13 % Final    EOSINOPHILS 04/17/2023 1  0 - 7 % Final    BASOPHILS 04/17/2023 0  0 - 1 % Final    IMMATURE GRANULOCYTES 04/17/2023 0  0.0 - 0.5 % Final    ABS. NEUTROPHILS 04/17/2023 5.8  1.8 - 8.0 K/UL Final    ABS. LYMPHOCYTES 04/17/2023 1.3  0.8 - 3.5 K/UL Final    ABS.  MONOCYTES 04/17/2023 1.0  0.0 - 1.0 K/UL Final    ABS. EOSINOPHILS 04/17/2023 0.1  0.0 - 0.4 K/UL Final    ABS. BASOPHILS 04/17/2023 0.0  0.0 - 0.1 K/UL Final    ABS. IMM. GRANS. 04/17/2023 0.0  0.00 - 0.04 K/UL Final    DF 04/17/2023 AUTOMATED    Final    Sodium 04/17/2023 141  136 - 145 mmol/L Final    Potassium 04/17/2023 3.8  3.5 - 5.1 mmol/L Final    Chloride 04/17/2023 107  97 - 108 mmol/L Final    CO2 04/17/2023 29  21 - 32 mmol/L Final    Anion gap 04/17/2023 5  5 - 15 mmol/L Final    Glucose 04/17/2023 101 (H)  65 - 100 mg/dL Final    BUN 04/17/2023 25 (H)  6 - 20 MG/DL Final    Creatinine 04/17/2023 0.97  0.55 - 1.02 MG/DL Final    BUN/Creatinine ratio 04/17/2023 26 (H)  12 - 20   Final    eGFR 04/17/2023 >60  >60 ml/min/1.73m2 Final    Comment:      Pediatric calculator link: [employer] Enrique[employer].at. org/professionals/kdoqi/gfr_calculatorped       These results are not intended for use in patients <25years of age. eGFR results are calculated without a race factor using  the 2021 CKD-EPI equation. Careful clinical correlation is recommended, particularly when comparing to results calculated using previous equations. The CKD-EPI equation is less accurate in patients with extremes of muscle mass, extra-renal metabolism of creatinine, excessive creatine ingestion, or following therapy that affects renal tubular secretion.       Calcium 04/17/2023 9.1  8.5 - 10.1 MG/DL Final    Color 04/17/2023 DARK YELLOW    Final    Color Reference Range: Straw, Yellow or Dark Yellow    Appearance 04/17/2023 CLEAR  CLEAR   Final    Specific gravity 04/17/2023 1.028  1.003 - 1.030   Final    pH (UA) 04/17/2023 5.5  5.0 - 8.0   Final    Protein 04/17/2023 TRACE (A)  NEG mg/dL Final    Glucose 04/17/2023 Negative  NEG mg/dL Final    Ketone 04/17/2023 15 (A)  NEG mg/dL Final    Bilirubin 04/17/2023 Negative  NEG   Final    Blood 04/17/2023 Negative  NEG   Final    Urobilinogen 04/17/2023 0.2  0.2 - 1.0 EU/dL Final    Nitrites 04/17/2023 Negative NEG   Final    Leukocyte Esterase 04/17/2023 Negative  NEG   Final    UA:UC IF INDICATED 04/17/2023 CULTURE NOT INDICATED BY UA RESULT  CNI   Final    WBC 04/17/2023 0-4  0 - 4 /hpf Final    RBC 04/17/2023 0-5  0 - 5 /hpf Final    Epithelial cells 04/17/2023 FEW  FEW /lpf Final    Epithelial cell category consists of squamous cells and /or transitional urothelial cells. Renal tubular cells, if present, are separately identified as such. Bacteria 04/17/2023 Negative  NEG /hpf Final    Hyaline cast 04/17/2023 0-2  0 - 5 /lpf Final    Troponin-High Sensitivity 04/17/2023 13  0 - 37 ng/L Final    Comment: A HS troponin value change of (+ or -) 50% or more below the 99th percentile, in a 1/2/3 hr interval represents a significant change. Clinical correlation is recommended. A HS troponin value change of (+ or -) 20% or above the 99th percentile, in a 1/2/3 hr interval represents a significant change. Clinical correlation is recommended. 99th percentile: Women 0-37 ng/L Men 0-57 ng/L  Patients taking more than 20 mg/day of biotin may have falsely negative results and should not use this test.  Method used is Siemens Advia Centaur XPT         IMAGING RESULTS:  XR CHEST PORT   Final Result   No acute cardiopulmonary disease. CT HEAD WO CONT   Final Result   No acute intracranial hemorrhage, mass or infarct. MEDICATIONS GIVEN:  Medications   aspirin chewable tablet 324 mg (324 mg Oral Given 4/17/23 2141)       IMPRESSION:  1. Episode of change in speech    2. Acute encephalopathy        DISPOSITION:  - Admit to hospitalist    Kathy Dietrich MD      Medical Decision Making  13L w/ hx lewy body dementia, PD, depression p/w 1d generalized weakness and speech changes. Pt nontoxic. Afebrile, hemodynamically stable w/o resp distress or hypoxia. Has mild word finding difficulty w/ NIHSS=1 but no other neuro deficits. NOT a CVA alert since symptoms >24hrs.  Ddx for AMS includes electrolyte/metabolic vs toxidrome/polypharmacy/substance abuse/withdrawal vs endocrine (thyroid storm, myxedema coma) vs infectious (UTI, PNA, less likely meningitis/encephalitis) vs CNS (ICH, CVA, SDH/epidural, HTN emergency, PRES) vs seizure or post-ictal state vs serotonin syndrome/NMS vs neurodegenerative disease or dementia. Ordered CTH, CXR, labs, UA. Monitor and reassess. 1800 CTH neg for acute findings. Labs unremarkable including UA from straight cath. Admitting for AMS and r/o stroke. Amount and/or Complexity of Data Reviewed  Independent Historian: caregiver  Labs: ordered. Decision-making details documented in ED Course. Radiology: ordered and independent interpretation performed. Decision-making details documented in ED Course. ECG/medicine tests: ordered and independent interpretation performed. Decision-making details documented in ED Course. Risk  OTC drugs. Decision regarding hospitalization. Procedures        Perfect Serve Consult for Admission  9:14 PM    ED Room Number: ER07/07  Patient Name and age:  Luis Eduardo Christine 68 y.o.  female  Working Diagnosis:   1. Episode of change in speech    2.  Acute encephalopathy        COVID-19 Suspicion:  no  Sepsis present:  no  Reassessment needed: yes  Code Status:  Full Code  Readmission: no  Isolation Requirements:  no  Recommended Level of Care:  telemetry  Department:[facility] Adult ED - 21

## 2023-04-18 ENCOUNTER — APPOINTMENT (OUTPATIENT)
Dept: MRI IMAGING | Age: 73
DRG: 689 | End: 2023-04-18
Attending: STUDENT IN AN ORGANIZED HEALTH CARE EDUCATION/TRAINING PROGRAM
Payer: MEDICARE

## 2023-04-18 LAB
COMMENT, HOLDF: NORMAL
FLUAV RNA SPEC QL NAA+PROBE: NOT DETECTED
FLUBV RNA SPEC QL NAA+PROBE: NOT DETECTED
PROCALCITONIN SERPL-MCNC: <0.05 NG/ML
SAMPLES BEING HELD,HOLD: NORMAL
SARS-COV-2 RNA RESP QL NAA+PROBE: NOT DETECTED

## 2023-04-18 PROCEDURE — 87636 SARSCOV2 & INF A&B AMP PRB: CPT

## 2023-04-18 PROCEDURE — 70551 MRI BRAIN STEM W/O DYE: CPT

## 2023-04-18 PROCEDURE — 74011000250 HC RX REV CODE- 250: Performed by: STUDENT IN AN ORGANIZED HEALTH CARE EDUCATION/TRAINING PROGRAM

## 2023-04-18 PROCEDURE — 74011250637 HC RX REV CODE- 250/637: Performed by: STUDENT IN AN ORGANIZED HEALTH CARE EDUCATION/TRAINING PROGRAM

## 2023-04-18 PROCEDURE — 36415 COLL VENOUS BLD VENIPUNCTURE: CPT

## 2023-04-18 PROCEDURE — 74011250636 HC RX REV CODE- 250/636: Performed by: STUDENT IN AN ORGANIZED HEALTH CARE EDUCATION/TRAINING PROGRAM

## 2023-04-18 PROCEDURE — G0378 HOSPITAL OBSERVATION PER HR: HCPCS

## 2023-04-18 PROCEDURE — 87040 BLOOD CULTURE FOR BACTERIA: CPT

## 2023-04-18 PROCEDURE — 84145 PROCALCITONIN (PCT): CPT

## 2023-04-18 PROCEDURE — 74011250637 HC RX REV CODE- 250/637: Performed by: PHYSICIAN ASSISTANT

## 2023-04-18 PROCEDURE — 96374 THER/PROPH/DIAG INJ IV PUSH: CPT

## 2023-04-18 PROCEDURE — 93005 ELECTROCARDIOGRAM TRACING: CPT

## 2023-04-18 PROCEDURE — 99222 1ST HOSP IP/OBS MODERATE 55: CPT | Performed by: PSYCHIATRY & NEUROLOGY

## 2023-04-18 RX ORDER — FLUDROCORTISONE ACETATE 0.1 MG/1
0.05 TABLET ORAL 2 TIMES DAILY
Status: DISCONTINUED | OUTPATIENT
Start: 2023-04-18 | End: 2023-04-21 | Stop reason: HOSPADM

## 2023-04-18 RX ORDER — ACETAMINOPHEN 500 MG
1000 TABLET ORAL EVERY 12 HOURS
COMMUNITY

## 2023-04-18 RX ORDER — ESCITALOPRAM OXALATE 10 MG/1
10 TABLET ORAL DAILY
COMMUNITY

## 2023-04-18 RX ORDER — QUETIAPINE FUMARATE 25 MG/1
50 TABLET, FILM COATED ORAL
Status: DISCONTINUED | OUTPATIENT
Start: 2023-04-18 | End: 2023-04-19

## 2023-04-18 RX ORDER — LEVOFLOXACIN 500 MG/1
500 TABLET, FILM COATED ORAL EVERY 24 HOURS
Status: DISCONTINUED | OUTPATIENT
Start: 2023-04-18 | End: 2023-04-19

## 2023-04-18 RX ORDER — DONEPEZIL HYDROCHLORIDE 10 MG/1
10 TABLET, FILM COATED ORAL DAILY
Status: DISCONTINUED | OUTPATIENT
Start: 2023-04-19 | End: 2023-04-21 | Stop reason: HOSPADM

## 2023-04-18 RX ORDER — ESTRADIOL 0.1 MG/G
2 CREAM VAGINAL DAILY
COMMUNITY

## 2023-04-18 RX ADMIN — FLUDROCORTISONE ACETATE 0.05 MG: 0.1 TABLET ORAL at 14:55

## 2023-04-18 RX ADMIN — QUETIAPINE FUMARATE 25 MG: 25 TABLET ORAL at 00:29

## 2023-04-18 RX ADMIN — SODIUM CHLORIDE, PRESERVATIVE FREE 2 G: 5 INJECTION INTRAVENOUS at 06:11

## 2023-04-18 RX ADMIN — SODIUM CHLORIDE, PRESERVATIVE FREE 10 ML: 5 INJECTION INTRAVENOUS at 05:02

## 2023-04-18 RX ADMIN — PANTOPRAZOLE SODIUM 40 MG: 40 TABLET, DELAYED RELEASE ORAL at 08:08

## 2023-04-18 RX ADMIN — APIXABAN 5 MG: 5 TABLET, FILM COATED ORAL at 08:08

## 2023-04-18 RX ADMIN — CARBIDOPA AND LEVODOPA 1 TABLET: 25; 100 TABLET, EXTENDED RELEASE ORAL at 19:32

## 2023-04-18 RX ADMIN — ASPIRIN 81 MG CHEWABLE TABLET 81 MG: 81 TABLET CHEWABLE at 08:08

## 2023-04-18 RX ADMIN — APIXABAN 5 MG: 5 TABLET, FILM COATED ORAL at 17:02

## 2023-04-18 RX ADMIN — SODIUM CHLORIDE 125 ML/HR: 9 INJECTION, SOLUTION INTRAVENOUS at 00:27

## 2023-04-18 RX ADMIN — SODIUM CHLORIDE 1000 ML: 9 INJECTION, SOLUTION INTRAVENOUS at 00:26

## 2023-04-18 RX ADMIN — SODIUM CHLORIDE 1000 ML: 9 INJECTION, SOLUTION INTRAVENOUS at 06:11

## 2023-04-18 RX ADMIN — SODIUM CHLORIDE, PRESERVATIVE FREE 10 ML: 5 INJECTION INTRAVENOUS at 00:31

## 2023-04-18 RX ADMIN — LEVOFLOXACIN 500 MG: 500 TABLET, FILM COATED ORAL at 14:55

## 2023-04-18 RX ADMIN — QUETIAPINE FUMARATE 50 MG: 25 TABLET ORAL at 21:49

## 2023-04-18 RX ADMIN — SODIUM CHLORIDE, PRESERVATIVE FREE 10 ML: 5 INJECTION INTRAVENOUS at 22:00

## 2023-04-18 RX ADMIN — CARVEDILOL 3.12 MG: 3.12 TABLET, FILM COATED ORAL at 00:28

## 2023-04-18 RX ADMIN — CARVEDILOL 3.12 MG: 3.12 TABLET, FILM COATED ORAL at 08:08

## 2023-04-18 RX ADMIN — APIXABAN 5 MG: 5 TABLET, FILM COATED ORAL at 00:31

## 2023-04-18 RX ADMIN — CARVEDILOL 3.12 MG: 3.12 TABLET, FILM COATED ORAL at 17:02

## 2023-04-18 RX ADMIN — CARBIDOPA AND LEVODOPA 1 TABLET: 25; 100 TABLET, EXTENDED RELEASE ORAL at 14:55

## 2023-04-18 NOTE — PROGRESS NOTES
Physical Therapy - defer  Orders acknowledged and chart reviewed in prep for PT evaluation. Patient currently JONATHAN for MRI. Will defer and follow up as able. Thank you.

## 2023-04-18 NOTE — ED NOTES
5669- Dr. Carr Drafts notified of pt's change in behavior & consistent hypotensive pressures.  Agrees to come evaluate pt    0355- pt was not cooperative, decreased command following

## 2023-04-18 NOTE — ED NOTES
Bedside and Verbal shift change report given to Jatinder Lee RN (oncoming nurse) by BURTON Boateng (offgoing nurse). Report included the following information SBAR, Kardex, ED Summary, [facility] and Recent Results.

## 2023-04-18 NOTE — H&P
History & Physical    Primary Care Provider: Katie Fry NP  Source of Information: Patient and chart review    History of Presenting Illness:   Khadra Fowler is a 68 y.o. female with history of Parkinson's, Lewy body dementia, major depressive disorder, GERD, A-fib on Eliquis who presents to ED accompanied by family for concerns of altered mental status. Patient is seen and examined at bedside. Patient is alert and oriented x4 and appropriately conversant. She states that she feels well and has no acute complaints or concerns. Family at bedside specifically daughter and son-in-law state over the last 2 days, patient has had increasing levels of confusion. She is appropriate at the moment but has not been so consistently for the last 2 days. Family states she has known history of recurrent UTIs and with concern for UTI. She was seen by her urologist today and noted to have mildly low blood pressures and referred to ED. She denies any urinary symptoms at this time. The patient denies any fever, chills, chest or abdominal pain, nausea, vomiting, cough, congestion, recent illness, palpitations, or dysuria. Remarkable vitals on ER Presentation: vss  Labs Remarkable for: unremarkable  ER Images: ct head and cxr: no acute process     Review of Systems:  Pertinent items are noted in the History of Present Illness. Past Medical History:   Diagnosis Date    Dementia ([facility] [address]     Depression     GERD (gastroesophageal reflux disease)     Parkinson disease ([facility] [address] 2015      No past surgical history on file. Prior to Admission medications    Not on File     Allergies   Allergen Reactions    Bactrim [Sulfamethoprim] Other (comments)     Vision change    Sulfa (Sulfonamide Antibiotics) Other (comments)      History reviewed. No pertinent family history.      SOCIAL HISTORY:  Patient resides:  Independently x   Assisted Living    SNF    With family care       Smoking history: None x   Former    Chronic      Alcohol history:   None x   Social    Chronic      Ambulates:   Independently x   w/cane    w/walker    w/wc    CODE STATUS:  DNR    Full x   Other      Objective:     Physical Exam:     Visit Vitals  BP (!) 148/77 (BP 1 Location: Left upper arm, BP Patient Position: At rest)   Pulse 64   Temp 98.1 °F (36.7 °C)   Resp 16   SpO2 94%      O2 Device: None (Room air)    General:  Alert, cooperative, no distress, appears stated age. Head:  Normocephalic, without obvious abnormality, atraumatic. Eyes:  Conjunctivae/corneas clear. PERRL, EOMs intact. Nose: Nares normal. Septum midline. Mucosa normal.        Neck: Supple, symmetrical, trachea midline. Lungs:   Clear to auscultation bilaterally. Chest wall:  No tenderness or deformity. Heart:  Regular rate and rhythm, S1, S2 normal   Abdomen:   Soft, non-tender. Bowel sounds normal. No masses,  No organomegaly. Extremities: Extremities normal, atraumatic, no cyanosis or edema. Pulses: 2+ and symmetric all extremities. Skin: Skin color, texture, turgor normal. No rashes or lesions   Neurologic: CNII-XII grossl intact. Data Review:     Recent Days:  Recent Labs     04/17/23  1709   WBC 8.2   HGB 13.6   HCT 41.5   *     Recent Labs     04/17/23  1709      K 3.8      CO2 29   *   BUN 25*   CREA 0.97   CA 9.1     No results for input(s): PH, PCO2, PO2, HCO3, FIO2 in the last 72 hours.     24 Hour Results:  Recent Results (from the past 24 hour(s))   GLUCOSE, POC    Collection Time: 04/17/23  3:02 PM   Result Value Ref Range    Glucose (POC) 104 65 - 117 mg/dL    Performed by Petty Cuevas    CBC WITH AUTOMATED DIFF    Collection Time: 04/17/23  5:09 PM   Result Value Ref Range    WBC 8.2 3.6 - 11.0 K/uL    RBC 4.18 3.80 - 5.20 M/uL    HGB 13.6 11.5 - 16.0 g/dL    HCT 41.5 35.0 - 47.0 %    MCV 99.3 (H) 80.0 - 99.0 FL    MCH 32.5 26.0 - 34.0 PG    MCHC 32.8 30.0 - 36.5 g/dL    RDW 13.4 11.5 - 14.5 %    PLATELET 824 (L) 233 - 400 K/uL    MPV 11.2 8.9 - 12.9 FL    NRBC 0.0 0  WBC    ABSOLUTE NRBC 0.00 0.00 - 0.01 K/uL    NEUTROPHILS 71 32 - 75 %    LYMPHOCYTES 16 12 - 49 %    MONOCYTES 12 5 - 13 %    EOSINOPHILS 1 0 - 7 %    BASOPHILS 0 0 - 1 %    IMMATURE GRANULOCYTES 0 0.0 - 0.5 %    ABS. NEUTROPHILS 5.8 1.8 - 8.0 K/UL    ABS. LYMPHOCYTES 1.3 0.8 - 3.5 K/UL    ABS. MONOCYTES 1.0 0.0 - 1.0 K/UL    ABS. EOSINOPHILS 0.1 0.0 - 0.4 K/UL    ABS. BASOPHILS 0.0 0.0 - 0.1 K/UL    ABS. IMM.  GRANS. 0.0 0.00 - 0.04 K/UL    DF AUTOMATED     METABOLIC PANEL, BASIC    Collection Time: 04/17/23  5:09 PM   Result Value Ref Range    Sodium 141 136 - 145 mmol/L    Potassium 3.8 3.5 - 5.1 mmol/L    Chloride 107 97 - 108 mmol/L    CO2 29 21 - 32 mmol/L    Anion gap 5 5 - 15 mmol/L    Glucose 101 (H) 65 - 100 mg/dL    BUN 25 (H) 6 - 20 MG/DL    Creatinine 0.97 0.55 - 1.02 MG/DL    BUN/Creatinine ratio 26 (H) 12 - 20      eGFR >60 >60 ml/min/1.73m2    Calcium 9.1 8.5 - 10.1 MG/DL   TROPONIN-HIGH SENSITIVITY    Collection Time: 04/17/23  5:09 PM   Result Value Ref Range    Troponin-High Sensitivity 13 0 - 37 ng/L   URINALYSIS W/ REFLEX CULTURE    Collection Time: 04/17/23  8:49 PM    Specimen: Urine   Result Value Ref Range    Color DARK YELLOW      Appearance CLEAR CLEAR      Specific gravity 1.028 1.003 - 1.030      pH (UA) 5.5 5.0 - 8.0      Protein TRACE (A) NEG mg/dL    Glucose Negative NEG mg/dL    Ketone 15 (A) NEG mg/dL    Bilirubin Negative NEG      Blood Negative NEG      Urobilinogen 0.2 0.2 - 1.0 EU/dL    Nitrites Negative NEG      Leukocyte Esterase Negative NEG      UA:UC IF INDICATED CULTURE NOT INDICATED BY UA RESULT CNI      WBC 0-4 0 - 4 /hpf    RBC 0-5 0 - 5 /hpf    Epithelial cells FEW FEW /lpf    Bacteria Negative NEG /hpf    Hyaline cast 0-2 0 - 5 /lpf         Imaging:     Assessment:     James Knight is a 68 y.o. female with history of Parkinson's, Lewy body dementia, major depressive disorder, GERD, A-fib on Eliquis who is admitted for AMS       Plan:       AMS  -none witnessed.   Appropriate and conversant in ED  -Has apparently had waxing and waning mentation at home  -Previous history of altered mental status secondary to UTI  -Work-up thus far unremarkable  -Cannot rule out progression of dementia discussed with her mental status changes  -Obtain MRI brain    Hypertension  -Given altered mental status, check urinalysis, blood cultures  -Empiric cefepime  -Volume resuscitation    Lewy body dementia/Parkinson's/major depressive disorder  -Continue home Sinemet, Aricept, Seroquel, fludrocortisone      FEN/GI -  [email or web address]  Activity - as tolerated  DVT prophylaxis - eliquis  GI prophylaxis -  not indicated  Disposition - home    CODE STATUS:   full code       Signed By: Sarkis Marquez MD     April 17, 2023

## 2023-04-18 NOTE — PROGRESS NOTES
[facility]  Hospitalist Group                                                                                          Hospitalist Progress Note  Rosalie Polanco [place]  Answering service: [phone] from in house phone        Date of Service:  2023  NAME:  Rojas Hernandez  :  1950  MRN:  [id number]      Admission Summary:   Rojas Hernandez is a 68 y.o. female with history of Parkinson's, Lewy body dementia, major depressive disorder, GERD, A-fib on Eliquis who presents to ED accompanied by family for concerns of altered mental status. Patient is seen and examined at bedside. Patient is alert and oriented x4 and appropriately conversant. She states that she feels well and has no acute complaints or concerns. Family at bedside specifically daughter and son-in-law state over the last 2 days, patient has had increasing levels of confusion. She is appropriate at the moment but has not been so consistently for the last 2 days. Family states she has known history of recurrent UTIs and with concern for UTI. She was seen by her urologist today and noted to have mildly low blood pressures and referred to ED. She denies any urinary symptoms at this time. Interval history / Subjective:   Saw the patient on rounds, Son in law at bedside. She is AAOx2 today which is a decline from her normal baseline.      Changed cefepime to PO levofloxacin to minimize risk of encephalopathy, neurology to see the patient     Assessment & Plan:     AMS  - Has apparently had waxing and waning mentation at home  - Previous history of altered mental status secondary to UTI  - UA grossly unremarkable, but she has had clean appearing UA's with UTI in the past   - BCX: NGTD  - ABX: levofloxacin PO, stopped cefepime  - Cannot rule out progression of dementia discussed with her mental status changes  - MRI brain: Chronic microvascular changes, study limited by motion.  - Neurology consulted Hypertension  - Given altered mental status, check urinalysis, blood cultures  - UA grossly unremarkable, but she has had clean appearing UA's with UTI in the past   - ABX: levofloxacin PO, stopped cefepime  - Volume resuscitation  - Continue Coreg     Lewy body dementia/Parkinson's/major depressive disorder  - Continue home Sinemet, Aricept, Seroquel, fludrocortisone  - Appreciate pharmacy assistance with medications, back on home regimen  - Placed consult to case management for PT/OT/HH    Code status: DNR  Prophylaxis: SCDs  Care Plan discussed with: Patient, Son in law, RN, Attending  Anticipated Disposition: Home, likely [address] Problems  Never Reviewed            Codes Class Noted POA    AMS (altered mental status) ICD-10-CM: R41.82  ICD-9-CM: 780.97  4/17/2023 Unknown             Review of Systems:   A comprehensive review of systems was negative except for that written in the HPI. Vital Signs:    Last 24hrs VS reviewed since prior progress note. Most recent are:  Visit Vitals  BP (!) 162/76   Pulse (!) 59   Temp 98.3 °F (36.8 °C)   Resp 15   Ht 5' 7\" (1.702 m)   Wt 95.6 kg (210 lb 12.2 oz)   SpO2 97%   BMI 33.01 kg/m²       No intake or output data in the 24 hours ending 04/18/23 1336     Physical Examination:     I had a face to face encounter with this patient and independently examined them on 4/18/2023 as outlined below:          Constitutional:  No acute distress, cooperative, pleasant    ENT:  Oral mucosa moist, oropharynx benign. Resp:  CTA bilaterally. No wheezing/rhonchi/rales. No accessory muscle use. CV:  Regular rhythm, normal rate, no murmurs, gallops, rubs    GI:  Soft, non distended, non tender. normoactive bowel sounds     Musculoskeletal:  No edema, warm, 2+ pulses throughout    Neurologic:  Moves all extremities.   AAOx2, CN II-XII reviewed, follows commands, tremulous             Data Review:    Review and/or order of clinical lab test  Review and/or order of tests in the radiology section of CPT  Review and/or order of tests in the medicine section of CPT      Labs:     Recent Labs     04/17/23  1709   WBC 8.2   HGB 13.6   HCT 41.5   *     Recent Labs     04/17/23  1709      K 3.8      CO2 29   BUN 25*   CREA 0.97   *   CA 9.1     No results for input(s): ALT, AP, TBIL, TBILI, TP, ALB, GLOB, GGT, AML, LPSE in the last 72 hours. No lab exists for component: SGOT, GPT, AMYP, HLPSE  No results for input(s): INR, PTP, APTT, INREXT in the last 72 hours. No results for input(s): FE, TIBC, PSAT, FERR in the last 72 hours. No results found for: FOL, RBCF   No results for input(s): PH, PCO2, PO2 in the last 72 hours. No results for input(s): CPK, CKNDX, TROIQ in the last 72 hours.     No lab exists for component: CPKMB  No results found for: CHOL, CHOLX, CHLST, CHOLV, HDL, HDLP, LDL, LDLC, DLDLP, TGLX, TRIGL, TRIGP, CHHD, CHHDX  Lab Results   Component Value Date/Time    Glucose (POC) 104 04/17/2023 03:02 PM     Lab Results   Component Value Date/Time    Color DARK YELLOW 04/17/2023 08:49 PM    Appearance CLEAR 04/17/2023 08:49 PM    Specific gravity 1.028 04/17/2023 08:49 PM    Specific gravity 1.010 06/02/2022 01:53 PM    pH (UA) 5.5 04/17/2023 08:49 PM    Protein TRACE (A) 04/17/2023 08:49 PM    Glucose Negative 04/17/2023 08:49 PM    Ketone 15 (A) 04/17/2023 08:49 PM    Bilirubin Negative 04/17/2023 08:49 PM    Urobilinogen 0.2 04/17/2023 08:49 PM    Nitrites Negative 04/17/2023 08:49 PM    Leukocyte Esterase Negative 04/17/2023 08:49 PM    Epithelial cells FEW 04/17/2023 08:49 PM    Bacteria Negative 04/17/2023 08:49 PM    WBC 0-4 04/17/2023 08:49 PM    RBC 0-5 04/17/2023 08:49 PM         Medications Reviewed:     Current Facility-Administered Medications   Medication Dose Route Frequency    levoFLOXacin (LEVAQUIN) tablet 500 mg  500 mg Oral Q24H    fludrocortisone (FLORINEF) tablet 0.05 mg  0.05 mg Oral BID    sodium chloride (NS) flush 5-40 mL 5-40 mL IntraVENous Q8H    sodium chloride (NS) flush 5-40 mL  5-40 mL IntraVENous PRN    acetaminophen (TYLENOL) tablet 650 mg  650 mg Oral Q6H PRN    Or    acetaminophen (TYLENOL) suppository 650 mg  650 mg Rectal Q6H PRN    polyethylene glycol (MIRALAX) packet 17 g  17 g Oral DAILY PRN    ondansetron (ZOFRAN ODT) tablet 4 mg  4 mg Oral Q8H PRN    Or    ondansetron (ZOFRAN) injection 4 mg  4 mg IntraVENous Q6H PRN    0.9% sodium chloride infusion  125 mL/hr IntraVENous CONTINUOUS    aspirin chewable tablet 81 mg  81 mg Oral DAILY    carbidopa-levodopa ER (SINEMET CR)  mg per tablet 1 Tablet  1 Tablet Oral TID    carvediloL (COREG) tablet 3.125 mg  3.125 mg Oral BID WITH MEALS    donepeziL (ARICEPT) tablet 20 mg  20 mg Oral DAILY    apixaban (ELIQUIS) tablet 5 mg  5 mg Oral BID    . PHARMACY TO SUBSTITUTE PER PROTOCOL (Reordered from: Nuplazid 34 mg cap)    Per Protocol    pantoprazole (PROTONIX) tablet 40 mg  40 mg Oral ACB    QUEtiapine (SEROquel) tablet 25 mg  25 mg Oral QHS     Current Outpatient Medications   Medication Sig    acetaminophen (TYLENOL) 500 mg tablet Take 2 Tablets by mouth every twelve (12) hours. 0900, 1700  Indications: pain    escitalopram oxalate (LEXAPRO) 10 mg tablet Take 1 Tablet by mouth daily. 0900    estradioL (ESTRACE) 0.01 % (0.1 mg/gram) vaginal cream Insert 2 g into vagina daily. AZO CRANBERRY 405-83-88 mg-mg-million tab Take 1,000 mg by mouth daily. 0900    melatonin 10 mg chew Take 20 mg by mouth nightly. 2100    Eliquis 5 mg tablet 1 Tablet two (2) times a day. 0900, 1700    carbidopa-levodopa ER (SINEMET CR)  mg per tablet 1 Tablet three (3) times daily. 0900, 1200, 1700    carvediloL (COREG) 3.125 mg tablet Take 1 Tablet by mouth two (2) times daily (with meals). 0900, 1700    donepeziL (ARICEPT) 10 mg tablet Take 1 Tablet by mouth daily. 0900    fludrocortisone (FLORINEF) 0.1 mg tablet Take 0.5 Tablets by mouth two (2) times a day.  0900, 1200    omeprazole (PRILOSEC) 20 mg capsule 1 Capsule every other day. 0900    potassium chloride (K-DUR, KLOR-CON M20) 20 mEq tablet 1 Tablet daily. 0900    QUEtiapine (SEROquel) 25 mg tablet Take 2 Tablets by mouth nightly. 2100    trospium (SANCTURA XL) 60 mg capsule 1 Capsule every evening. 1700    Nuplazid 34 mg cap Take 34 mg by mouth every evening. 1700  Indications: psychosis associated with Parkinson's disease    Gemtesa 75 mg tablet 1 Tablet daily.  0900  Indications: a condition where the urge to urinate results in urine leakage, overactive bladder     ______________________________________________________________________  EXPECTED LENGTH OF STAY: - - -  ACTUAL LENGTH OF STAY:          0                 Katheren Fass Milligram, PA-C

## 2023-04-18 NOTE — ED NOTES
Bedside and Verbal shift change report given to Judge Conn RN (oncoming nurse) by Ashely Mcallister RN (offgoing nurse). Report included the following information SBAR, Kardex, ED Summary, [facility] and Recent Results.

## 2023-04-18 NOTE — ED NOTES
TRANSFER - OUT REPORT:    Verbal report given to Kellen Pearl (name) on James Knight  being transferred to neuro (unit) for routine progression of care       Report consisted of patients Situation, Background, Assessment and   Recommendations(SBAR). Information from the following report(s) SBAR, ED Summary, [facility] and Recent Results was reviewed with the receiving nurse. Lines:   Peripheral IV 04/17/23 Right Antecubital (Active)   Site Assessment Clean, dry, & intact 04/17/23 1707   Phlebitis Assessment 0 04/17/23 1707   Infiltration Assessment 0 04/17/23 1707   Dressing Status Clean, dry, & intact 04/17/23 1707   Dressing Type Transparent 04/17/23 1707   Hub Color/Line Status Pink 04/17/23 1707   Action Taken Blood drawn 04/17/23 1707   Alcohol Cap Used Yes 04/17/23 1707        Opportunity for questions and clarification was provided.

## 2023-04-18 NOTE — PROGRESS NOTES
Occupational Therapy   [phone]    Orders acknowledged and chart reviewed in prep for OT evaluation. Patient currently JONATHAN for MRI. Will defer and follow up as able. Thank you. Devika Payne MS, OTR/L

## 2023-04-18 NOTE — CONSULTS
Neurology Consult Note     NAME: Yoli Clay   :  1950   MRN:  [id number]   DATE:  2023       HPI:  Pt is a 68yo RH female admitted 23 with generalized weakness and AMS. Pt has h/o LBD and PD, followed by Dr. Noé Mata at [facility]. She is seen with her daughter with whom she lives and her daughter's neighbor who helps care for the pt, they provide all of the history as patient is actively hallucinating. Pt was doing well until , had an acute change in cognitive function and ability to ambulate. Typically she ambulates outside the house with a walker or wheelchair depending on the distance or going to go, and does not need an assistive device in the home. Her daughter was concerned she might have a UTI, but UA is neg. She was started on empiric cefepime last night, now stopped due to worsening mental status and neg UA. Family reports 2-3 day h/o cough. In review of EMR, she was last seen by Dr. Noé Mata at [facility] in Feb with mention of behavioral disturbance and hallucinations, on Nuplazid and Seroquel with recent dose increase. CBC with differential with platelets 241, BMP-glucose 101, BUN 25, UA with ketones otherwise negative. Blood cultures pending. CTH neg. MRI brain - limited due to motion artifact, no acute stroke, mild CIWM changes. Her daughter notes that she manages all of her mother's medications and the patient does not have access to them. PMH:  Afib on Eliquis  PD  LBD  GERD  Depression  Parkinsons psychosis on Nuplazid.      ROS:  Unable to assess due to patient factors    MEDS:      Current Facility-Administered Medications:     levoFLOXacin (LEVAQUIN) tablet 500 mg, 500 mg, Oral, Q24H, Milligram, Hipbone Squibb, PA-C, 500 mg at 23 1455    fludrocortisone (FLORINEF) tablet 0.05 mg, 0.05 mg, Oral, BID, Milligram, Oxxyibb, TAYE, 0.05 mg at 04/18/23 1455    QUEtiapine (SEROquel) tablet 50 mg, 50 mg, Oral, QHS, Milligram, Yamileth Lei PA-C    [START ON 4/19/2023] donepeziL (ARICEPT) tablet 10 mg, 10 mg, Oral, DAILY, Milligram, Yamileth Lei PA-C    sodium chloride (NS) flush 5-40 mL, 5-40 mL, IntraVENous, Q8H, Checo Hyde MD, 10 mL at 04/18/23 0502    sodium chloride (NS) flush 5-40 mL, 5-40 mL, IntraVENous, PRN, Checo Hyde MD    acetaminophen (TYLENOL) tablet 650 mg, 650 mg, Oral, Q6H PRN **OR** acetaminophen (TYLENOL) suppository 650 mg, 650 mg, Rectal, Q6H PRN, Checo Hyde MD    polyethylene glycol (MIRALAX) packet 17 g, 17 g, Oral, DAILY PRN, Checo Hyde MD    ondansetron (ZOFRAN ODT) tablet 4 mg, 4 mg, Oral, Q8H PRN **OR** ondansetron (ZOFRAN) injection 4 mg, 4 mg, IntraVENous, Q6H PRN, Checo Hyde MD    0.9% sodium chloride infusion, 125 mL/hr, IntraVENous, CONTINUOUS, Checo Hyde MD, Last Rate: 125 mL/hr at 04/18/23 0027, 125 mL/hr at 04/18/23 0027    carbidopa-levodopa ER (SINEMET CR)  mg per tablet 1 Tablet, 1 Tablet, Oral, TID, Checo Hyde MD, 1 Tablet at 04/18/23 1455    carvediloL (COREG) tablet 3.125 mg, 3.125 mg, Oral, BID WITH MEALS, Checo Hyde MD, 3.125 mg at 04/18/23 0808    apixaban (ELIQUIS) tablet 5 mg, 5 mg, Oral, BID, Checo Hyde MD, 5 mg at 04/18/23 0808    . PHARMACY TO SUBSTITUTE PER PROTOCOL (Reordered from: Nuplazid 34 mg cap), , , Per Protocol, Checo Hyde MD    pantoprazole (PROTONIX) tablet 40 mg, 40 mg, Oral, ACB, Checo Hyde MD, 40 mg at 04/18/23 0808    Current Outpatient Medications:     acetaminophen (TYLENOL) 500 mg tablet, Take 2 Tablets by mouth every twelve (12) hours. 0900, 1700  Indications: pain, Disp: , Rfl:     escitalopram oxalate (LEXAPRO) 10 mg tablet, Take 1 Tablet by mouth daily. 0900, Disp: , Rfl:     estradioL (ESTRACE) 0.01 % (0.1 mg/gram) vaginal cream, Insert 2 g into vagina daily. , Disp: , Rfl:     AZO CRANBERRY 562-39-02 mg-mg-million tab, Take 1,000 mg by mouth daily. 0900, Disp: , Rfl:     melatonin 10 mg chew, Take 20 mg by mouth nightly. , Disp: , Rfl:     Eliquis 5 mg tablet, 1 Tablet two (2) times a day. 0900, 170, Disp: , Rfl:     carbidopa-levodopa ER (SINEMET CR)  mg per tablet, 1 Tablet three (3) times daily. 0900, 1200, 1700, Disp: , Rfl:     carvediloL (COREG) 3.125 mg tablet, Take 1 Tablet by mouth two (2) times daily (with meals). 0900, 1700, Disp: , Rfl:     donepeziL (ARICEPT) 10 mg tablet, Take 1 Tablet by mouth daily. 900, Disp: , Rfl:     fludrocortisone (FLORINEF) 0.1 mg tablet, Take 0.5 Tablets by mouth two (2) times a day. 0900, 1200, Disp: , Rfl:     omeprazole (PRILOSEC) 20 mg capsule, 1 Capsule every other day. 0900, Disp: , Rfl:     potassium chloride (K-DUR, KLOR-CON M20) 20 mEq tablet, 1 Tablet daily. 09, Disp: , Rfl:     QUEtiapine (SEROquel) 25 mg tablet, Take 2 Tablets by mouth nightly. , Disp: , Rfl:     trospium (SANCTURA XL) 60 mg capsule, 1 Capsule every evening. , Disp: , Rfl:     Nuplazid 34 mg cap, Take 34 mg by mouth every evening. 1700  Indications: psychosis associated with Parkinson's disease, Disp: , Rfl:     Gemtesa 75 mg tablet, 1 Tablet daily. 0900  Indications: a condition where the urge to urinate results in urine leakage, overactive bladder, Disp: , Rfl:       Allergies   Allergen Reactions    Bactrim [Sulfamethoprim] Other (comments)     Vision change    Sulfa (Sulfonamide Antibiotics) Other (comments)         SH: Lives with her daughter    No T/E/D    FH:  Not pertinent      PHYSICAL EXAM:    Visit Vitals  /61   Pulse 72   Temp 98.3 °F (36.8 °C)   Resp 25   Ht 5' 7\" (1.702 m)   Wt 210 lb 12.2 oz (95.6 kg)   SpO2 95%   BMI 33.01 kg/m²     Temp (24hrs), Av.2 °F (36.8 °C), Min:98.1 °F (36.7 °C), Max:98.3 °F (36.8 °C)    General: Well developed well nourished elderly patient in no apparent distress.    Cardiac: Regular rate and rhythm with no murmurs. Carotids: 2+ symmetric, no bruits  Extremities: 2+ Radial pulses, no cyanosis or edema    Neurological Exam:  Mental Status: Oriented to person, does not follow commands, occasionally answers a question not necessarily appropriately and incorporates this into her visual and auditory hallucinations. No dysarthria. Cranial Nerves:   VF-unable to assess, PERRL, EOMI, no nystagmus, no ptosis. Facial movement is symmetric. Hearing is intact   Motor:  Limited motor testing, increased tone throughout, resist movement of her extremities, has spontaneous movement x4   Reflexes:   Deep tendon reflexes 2+ and symmetric. Toes downgoing. Sensory:   Remainder unable to be assessed due to patient factors   Gait:     Cerebellar:           STUDIES AND REPORTS:  Recent Results (from the past 24 hour(s))   CBC WITH AUTOMATED DIFF    Collection Time: 04/17/23  5:09 PM   Result Value Ref Range    WBC 8.2 3.6 - 11.0 K/uL    RBC 4.18 3.80 - 5.20 M/uL    HGB 13.6 11.5 - 16.0 g/dL    HCT 41.5 35.0 - 47.0 %    MCV 99.3 (H) 80.0 - 99.0 FL    MCH 32.5 26.0 - 34.0 PG    MCHC 32.8 30.0 - 36.5 g/dL    RDW 13.4 11.5 - 14.5 %    PLATELET 396 (L) 454 - 400 K/uL    MPV 11.2 8.9 - 12.9 FL    NRBC 0.0 0  WBC    ABSOLUTE NRBC 0.00 0.00 - 0.01 K/uL    NEUTROPHILS 71 32 - 75 %    LYMPHOCYTES 16 12 - 49 %    MONOCYTES 12 5 - 13 %    EOSINOPHILS 1 0 - 7 %    BASOPHILS 0 0 - 1 %    IMMATURE GRANULOCYTES 0 0.0 - 0.5 %    ABS. NEUTROPHILS 5.8 1.8 - 8.0 K/UL    ABS. LYMPHOCYTES 1.3 0.8 - 3.5 K/UL    ABS. MONOCYTES 1.0 0.0 - 1.0 K/UL    ABS. EOSINOPHILS 0.1 0.0 - 0.4 K/UL    ABS. BASOPHILS 0.0 0.0 - 0.1 K/UL    ABS. IMM.  GRANS. 0.0 0.00 - 0.04 K/UL    DF AUTOMATED     METABOLIC PANEL, BASIC    Collection Time: 04/17/23  5:09 PM   Result Value Ref Range    Sodium 141 136 - 145 mmol/L    Potassium 3.8 3.5 - 5.1 mmol/L    Chloride 107 97 - 108 mmol/L    CO2 29 21 - 32 mmol/L    Anion gap 5 5 - 15 mmol/L    Glucose 101 (H) 65 - 100 mg/dL    BUN 25 (H) 6 - 20 MG/DL    Creatinine 0.97 0.55 - 1.02 MG/DL    BUN/Creatinine ratio 26 (H) 12 - 20      eGFR >60 >60 ml/min/1.73m2    Calcium 9.1 8.5 - 10.1 MG/DL   TROPONIN-HIGH SENSITIVITY    Collection Time: 04/17/23  5:09 PM   Result Value Ref Range    Troponin-High Sensitivity 13 0 - 37 ng/L   URINALYSIS W/ REFLEX CULTURE    Collection Time: 04/17/23  8:49 PM    Specimen: Urine   Result Value Ref Range    Color DARK YELLOW      Appearance CLEAR CLEAR      Specific gravity 1.028 1.003 - 1.030      pH (UA) 5.5 5.0 - 8.0      Protein TRACE (A) NEG mg/dL    Glucose Negative NEG mg/dL    Ketone 15 (A) NEG mg/dL    Bilirubin Negative NEG      Blood Negative NEG      Urobilinogen 0.2 0.2 - 1.0 EU/dL    Nitrites Negative NEG      Leukocyte Esterase Negative NEG      UA:UC IF INDICATED CULTURE NOT INDICATED BY UA RESULT CNI      WBC 0-4 0 - 4 /hpf    RBC 0-5 0 - 5 /hpf    Epithelial cells FEW FEW /lpf    Bacteria Negative NEG /hpf    Hyaline cast 0-2 0 - 5 /lpf   SAMPLES BEING HELD    Collection Time: 04/18/23  7:04 AM   Result Value Ref Range    SAMPLES BEING HELD 1BLUE 1RED 1PST 1LAV     COMMENT        Add-on orders for these samples will be processed based on acceptable specimen integrity and analyte stability, which may vary by analyte.    CULTURE, BLOOD, PAIRED    Collection Time: 04/18/23  7:06 AM    Specimen: Blood   Result Value Ref Range    Special Requests: NO SPECIAL REQUESTS      Culture result: NO GROWTH <24 HRS     EKG, 12 LEAD, INITIAL    Collection Time: 04/18/23  1:50 PM   Result Value Ref Range    Ventricular Rate 65 BPM    QRS Duration 82 ms    Q-T Interval 432 ms    QTC Calculation (Bezet) 449 ms    Calculated R Axis 35 degrees    Calculated T Axis 82 degrees    Diagnosis       Atrial fibrillation  Nonspecific ST and T wave abnormality  Abnormal ECG  When compared with ECG of 02-JUN-2022 09:37,  Atrial fibrillation has replaced Sinus rhythm  Criteria for Septal infarct are no longer present  ST now depressed in Lateral leads  Nonspecific T wave abnormality no longer evident in Anterior leads  Nonspecific T wave abnormality now evident in Lateral leads       MRI Results (most recent):  Results from Hospital Encounter encounter on 04/17/23    MRI BRAIN WO CONT    Narrative  EXAM: MRI BRAIN WO CONT    INDICATION: ams    COMPARISON: CT head 4/17/2023. CONTRAST: None. TECHNIQUE:  Multiplanar multisequence acquisition without contrast of the brain. FINDINGS:  Evaluation is significantly limited by motion. The ventricles are normal in size and position. Few scattered periventricular  and deep white matter T2/FLAIR hyperintensities, consistent with mild chronic  microvascular ischemic disease. Small area of chronic hemorrhage in the left  temporo-occipital lobe. There is no acute infarct, hemorrhage, extra-axial fluid  collection, or mass effect. There is no cerebellar tonsillar herniation. Expected arterial flow-voids are present. Mild mucosal thickening in the left maxillary sinus. Small bilateral mastoid  effusions. The orbital contents are within normal limits with bilateral lens  implants. No significant osseous or scalp lesions are identified. Impression  1. Evaluation is significantly limited by motion. No acute intracranial  abnormality. 2. Mild chronic microvascular ischemic disease. CT Results (most recent):  Results from Hospital Encounter encounter on 04/17/23    CT HEAD WO CONT    Narrative  INDICATION: aphasia    Exam: Noncontrast CT of the brain is performed with 5 mm collimation. CT dose reduction was achieved with the use of the standardized protocol  tailored for this examination and automatic exposure control for dose  modulation. Direct comparison is made to prior CT dated February 2021. FINDINGS: There is age-appropriate diffuse cortical atrophy. There is no acute  intracranial hemorrhage, mass, mass effect or herniation.  Ventricular system is  normal. The gray-white matter differentiation is well-preserved. The mastoid air  cells are well pneumatized. The visualized paranasal sinuses are normal.    Impression  No acute intracranial hemorrhage, mass or infarct. Assessment and Plan:   Pt is a 68yo RH female with LBD, PD, with behavorial disturbance and hallucinations at baseline on Seroquel and Nuplazid at home, admitted 4/17/23 with acute mental status changes and unable to ambulate. Pt has had a cough for the last 2-3 days. There was concern for possible infection and cefepime started on admission, now stopped due to worsening AMS. UA is neg for infection, +ketones. BUN 25, She was started on empiric cefepime last night, now stopped due to worsening mental status and neg UA. Blood cultures pending. CTH neg. MRI brain - limited due to motion artifact, no acute stroke, mild CIWM changes. Exam is very limited due to pt factors, clearly having visual and auditory hallucinations, non-focal.  Acute encephalopathy, possibly due to URI on background of LBD, possibly worsened overnight due to cefepime, but also due to being in the ED. Continue home Nuplazid and Seroquel. Complete metabolic/infectious work-up. Discussed with the hospitalist caring for the patient. Signed: Alfredo Carlton MD

## 2023-04-18 NOTE — PROGRESS NOTES
Admission Medication Reconciliation:    Information obtained from:  Patient's son/caregiver   RxQuery data available¹:  YES    Comments/Recommendations: Updated PTA meds/reviewed patient's allergies. 1)  Patient is a poor historian. Patient son is an excellent historian and provided medication history. Of note, son reports running our of Coreg 5 days ago due to delay with [facility]    2)  Medication changes (since last review): Added  - Escitalopram 10 mg daily (0900)  - Estradiol cream daily to every other day  - APAP 1000 mg twice daily for pain (0900, 1700)  - Cranberry extract 1000 mg for recurrent UTIs (0900)  - Melatonin 20 mg QHS (2100)    Adjusted  - Sinemet CR 1 tablet TID at 0900, 1200, and 1700  - Donepezil is now 10 mg vs 20 mg daily (0900)  - Eliquis 5 mg Twice daily (0900, 1700)  - Fludrocortisone 0.05 mg Twice daily (0900, 1200)  - Gemtesa 75 mg daily (0900)  - Nuplazid 34 mg qPM (1700)  - Omeprazole 20 mg is now 20 mg every other day (0900) vs. Daily  - KCl 20 mg daily (0900)  - Quetiapine is now 50 mg QHS (2100) vs. 25 mg QHS   - Trospium 60 mg qPM (1700)    Removed  - ASA 81 mg daily (no longer taking)    3)  Attending notified of completed medication reconciliation. Provider was already aware of interview and many of the follow up orders adjusted per provide. ¹RxQuery pharmacy benefit data reflects medications filled and processed through the patient's insurance, however   this data does NOT capture whether the medication was picked up or is currently being taken by the patient.     Allergies:  Bactrim [sulfamethoprim] and Sulfa (sulfonamide antibiotics)    Significant PMH/Disease States:   Past Medical History:   Diagnosis Date    Dementia ([facility] [address]     Depression     GERD (gastroesophageal reflux disease)     Parkinson disease ([facility] [address] 2015     Chief Complaint for this Admission:    Chief Complaint   Patient presents with    Extremity Weakness    Lethargy     Prior to Admission Medications:   Prior to Admission Medications   Prescriptions Last Dose Informant Taking? AZO CRANBERRY 013-01-85 mg-mg-million tab   Yes   Sig: Take 1,000 mg by mouth daily. 0900   Eliquis 5 mg tablet   Yes   Si Tablet two (2) times a day. 0900, 1700   Gemtesa 75 mg tablet   Yes   Si Tablet daily. 0900  Indications: a condition where the urge to urinate results in urine leakage, overactive bladder   Nuplazid 34 mg cap   Yes   Sig: Take 34 mg by mouth every evening. 1700  Indications: psychosis associated with Parkinson's disease   QUEtiapine (SEROquel) 25 mg tablet   Yes   Sig: Take 2 Tablets by mouth nightly.    acetaminophen (TYLENOL) 500 mg tablet   Yes   Sig: Take 2 Tablets by mouth every twelve (12) hours. 0900, 1700  Indications: pain   carbidopa-levodopa ER (SINEMET CR)  mg per tablet   Yes   Si Tablet three (3) times daily. 0900, 1200, 1700   carvediloL (COREG) 3.125 mg tablet   Yes   Sig: Take 1 Tablet by mouth two (2) times daily (with meals). 0900, 1700   donepeziL (ARICEPT) 10 mg tablet   Yes   Sig: Take 1 Tablet by mouth daily. 0900   escitalopram oxalate (LEXAPRO) 10 mg tablet   Yes   Sig: Take 1 Tablet by mouth daily. 0900   estradioL (ESTRACE) 0.01 % (0.1 mg/gram) vaginal cream   Yes   Sig: Insert 2 g into vagina daily. fludrocortisone (FLORINEF) 0.1 mg tablet   Yes   Sig: Take 0.5 Tablets by mouth two (2) times a day. 0900, 1200   melatonin 10 mg chew   Yes   Sig: Take 20 mg by mouth nightly. 2100   omeprazole (PRILOSEC) 20 mg capsule   Yes   Si Capsule every other day. 0900   potassium chloride (K-DUR, KLOR-CON M20) 20 mEq tablet   Yes   Si Tablet daily. 0900   trospium (SANCTURA XL) 60 mg capsule   Yes   Si Capsule every evening.  1700      Facility-Administered Medications: None     Please contact the main inpatient pharmacy with any questions or concerns at [phone] and we will direct you to the clinical pharmacist covering this patient's care while in-house.    STANLEY GalvanD

## 2023-04-19 ENCOUNTER — APPOINTMENT (OUTPATIENT)
Dept: GENERAL RADIOLOGY | Age: 73
DRG: 689 | End: 2023-04-19
Payer: MEDICARE

## 2023-04-19 LAB
ANION GAP SERPL CALC-SCNC: 1 MMOL/L (ref 5–15)
BUN SERPL-MCNC: 15 MG/DL (ref 6–20)
BUN/CREAT SERPL: 19 (ref 12–20)
CALCIUM SERPL-MCNC: 8.3 MG/DL (ref 8.5–10.1)
CHLORIDE SERPL-SCNC: 109 MMOL/L (ref 97–108)
CO2 SERPL-SCNC: 29 MMOL/L (ref 21–32)
CREAT SERPL-MCNC: 0.78 MG/DL (ref 0.55–1.02)
GLUCOSE SERPL-MCNC: 129 MG/DL (ref 65–100)
MAGNESIUM SERPL-MCNC: 1.7 MG/DL (ref 1.6–2.4)
POTASSIUM SERPL-SCNC: 3 MMOL/L (ref 3.5–5.1)
SODIUM SERPL-SCNC: 139 MMOL/L (ref 136–145)
TROPONIN I SERPL HS-MCNC: 20 NG/L (ref 0–37)

## 2023-04-19 PROCEDURE — 80048 BASIC METABOLIC PNL TOTAL CA: CPT

## 2023-04-19 PROCEDURE — 99223 1ST HOSP IP/OBS HIGH 75: CPT | Performed by: SPECIALIST

## 2023-04-19 PROCEDURE — 74011250636 HC RX REV CODE- 250/636

## 2023-04-19 PROCEDURE — 93005 ELECTROCARDIOGRAM TRACING: CPT

## 2023-04-19 PROCEDURE — 36415 COLL VENOUS BLD VENIPUNCTURE: CPT

## 2023-04-19 PROCEDURE — 83735 ASSAY OF MAGNESIUM: CPT

## 2023-04-19 PROCEDURE — 74011250637 HC RX REV CODE- 250/637

## 2023-04-19 PROCEDURE — 84484 ASSAY OF TROPONIN QUANT: CPT

## 2023-04-19 PROCEDURE — 97161 PT EVAL LOW COMPLEX 20 MIN: CPT

## 2023-04-19 PROCEDURE — 97530 THERAPEUTIC ACTIVITIES: CPT

## 2023-04-19 PROCEDURE — 97535 SELF CARE MNGMENT TRAINING: CPT

## 2023-04-19 PROCEDURE — 74011250637 HC RX REV CODE- 250/637: Performed by: NURSE PRACTITIONER

## 2023-04-19 PROCEDURE — 74011250637 HC RX REV CODE- 250/637: Performed by: STUDENT IN AN ORGANIZED HEALTH CARE EDUCATION/TRAINING PROGRAM

## 2023-04-19 PROCEDURE — 74011000250 HC RX REV CODE- 250: Performed by: STUDENT IN AN ORGANIZED HEALTH CARE EDUCATION/TRAINING PROGRAM

## 2023-04-19 PROCEDURE — 74011250637 HC RX REV CODE- 250/637: Performed by: PHYSICIAN ASSISTANT

## 2023-04-19 PROCEDURE — 97165 OT EVAL LOW COMPLEX 30 MIN: CPT

## 2023-04-19 PROCEDURE — 74011000258 HC RX REV CODE- 258

## 2023-04-19 PROCEDURE — 99231 SBSQ HOSP IP/OBS SF/LOW 25: CPT | Performed by: PSYCHIATRY & NEUROLOGY

## 2023-04-19 PROCEDURE — G0378 HOSPITAL OBSERVATION PER HR: HCPCS

## 2023-04-19 PROCEDURE — 71045 X-RAY EXAM CHEST 1 VIEW: CPT

## 2023-04-19 PROCEDURE — 65270000046 HC RM TELEMETRY

## 2023-04-19 RX ORDER — AMOXICILLIN AND CLAVULANATE POTASSIUM 875; 125 MG/1; MG/1
1 TABLET, FILM COATED ORAL EVERY 12 HOURS
Status: DISCONTINUED | OUTPATIENT
Start: 2023-04-19 | End: 2023-04-19

## 2023-04-19 RX ORDER — MAGNESIUM SULFATE 1 G/100ML
1 INJECTION INTRAVENOUS ONCE
Status: COMPLETED | OUTPATIENT
Start: 2023-04-19 | End: 2023-04-19

## 2023-04-19 RX ORDER — QUETIAPINE FUMARATE 25 MG/1
25 TABLET, FILM COATED ORAL
Status: DISCONTINUED | OUTPATIENT
Start: 2023-04-19 | End: 2023-04-21 | Stop reason: HOSPADM

## 2023-04-19 RX ORDER — POTASSIUM CHLORIDE 750 MG/1
40 TABLET, FILM COATED, EXTENDED RELEASE ORAL
Status: COMPLETED | OUTPATIENT
Start: 2023-04-19 | End: 2023-04-19

## 2023-04-19 RX ADMIN — DONEPEZIL HYDROCHLORIDE 10 MG: 10 TABLET ORAL at 10:10

## 2023-04-19 RX ADMIN — CARBIDOPA AND LEVODOPA 1 TABLET: 25; 100 TABLET, EXTENDED RELEASE ORAL at 18:06

## 2023-04-19 RX ADMIN — APIXABAN 5 MG: 5 TABLET, FILM COATED ORAL at 20:53

## 2023-04-19 RX ADMIN — CARBIDOPA AND LEVODOPA 1 TABLET: 25; 100 TABLET, EXTENDED RELEASE ORAL at 12:47

## 2023-04-19 RX ADMIN — MAGNESIUM SULFATE HEPTAHYDRATE 1 G: 1 INJECTION, SOLUTION INTRAVENOUS at 20:53

## 2023-04-19 RX ADMIN — PIPERACILLIN AND TAZOBACTAM 3.38 G: 3; .375 INJECTION, POWDER, LYOPHILIZED, FOR SOLUTION INTRAVENOUS at 20:53

## 2023-04-19 RX ADMIN — ACETAMINOPHEN 650 MG: 325 TABLET ORAL at 10:21

## 2023-04-19 RX ADMIN — SODIUM CHLORIDE, PRESERVATIVE FREE 10 ML: 5 INJECTION INTRAVENOUS at 13:49

## 2023-04-19 RX ADMIN — CARBIDOPA AND LEVODOPA 1 TABLET: 25; 100 TABLET, EXTENDED RELEASE ORAL at 10:08

## 2023-04-19 RX ADMIN — SODIUM CHLORIDE, PRESERVATIVE FREE 10 ML: 5 INJECTION INTRAVENOUS at 22:00

## 2023-04-19 RX ADMIN — POTASSIUM CHLORIDE 40 MEQ: 750 TABLET, FILM COATED, EXTENDED RELEASE ORAL at 20:53

## 2023-04-19 RX ADMIN — FLUDROCORTISONE ACETATE 0.05 MG: 0.1 TABLET ORAL at 10:08

## 2023-04-19 RX ADMIN — PANTOPRAZOLE SODIUM 40 MG: 40 TABLET, DELAYED RELEASE ORAL at 10:08

## 2023-04-19 RX ADMIN — CARVEDILOL 3.12 MG: 3.12 TABLET, FILM COATED ORAL at 10:09

## 2023-04-19 RX ADMIN — QUETIAPINE FUMARATE 25 MG: 25 TABLET ORAL at 21:09

## 2023-04-19 RX ADMIN — APIXABAN 5 MG: 5 TABLET, FILM COATED ORAL at 10:08

## 2023-04-19 RX ADMIN — FLUDROCORTISONE ACETATE 0.05 MG: 0.1 TABLET ORAL at 12:46

## 2023-04-19 NOTE — PROGRESS NOTES
Bedside shift change report given to Medina Goodrich (oncoming nurse) by Therese Em (offgoing nurse). Report included the following information SBAR, Accordion, Recent Results, and Med Rec Status.

## 2023-04-19 NOTE — PROGRESS NOTES
Problem: Self Care Deficits Care Plan (Adult)  Goal: *Acute Goals and Plan of Care (Insert Text)  Description: FUNCTIONAL STATUS PRIOR TO ADMISSION: The patient lived with her daughter and son in law, and between them and additional family members provide 24/7 supervision. PMH of Parkinson's disease and lewy body dementia. She typically ambulates in the home independently, is mod I for community mobility with RW. Family provides assistance for bathing and LB dressing, pt can typically feed herself and perform portions of dressing. HOME SUPPORT: The patient lived with her daughter and brother in law. Occupational Therapy Goals  Initiated 4/19/2023  1. Patient will perform grooming with supervision/set-up in supported sit within 7 day(s). 2.  Patient will sit EOB x5 minutes with CGA within 7 day(s). 3.  Patient will perform upper body dressing with minimal assistance within 7 day(s). 4.  Patient will perform toilet transfers with moderate assistance  within 7 day(s). 5.  Patient will perform all aspects of toileting with moderate assistance  within 7 day(s). 6.  Patient will participate in upper extremity therapeutic exercise/activities with minimal assistance for 5 minutes within 7 day(s). Outcome: Progressing Towards Goal  OCCUPATIONAL THERAPY EVALUATION  Patient: Suzanna Rodríguez (47 y.o. female)  Date: 4/19/2023  Primary Diagnosis: AMS (altered mental status) [R41.82]       Precautions:  Fall    ASSESSMENT  Based on the objective data described below, the patient presents with confusion, impaired visual tracking, generalized weakness, decreased activity tolerance, impaired balance and functional mobility, and baseline tremors following admission for AMS. Pt lives with her daughter and JOON, has history of Parkinson's disease and lewy body dementia. She typically ambulates in the home without AD, receives assistance for LB dressing, bathing, toileting as needed. Pt received supine in bed.  She required max A x2 for supine> sit, demo'd impaired sitting balance with L lateral/posterior lean. She stood with max A x2 and took a few shuffling side steps towards [facility] with cues for motor planning and physical assistance to manage RW. Pt fatigued quickly and was returned to semisupine with max A x2. She washed face with min A and remained in bed at end of session with needs met, VSS, daughter present. At this time pt is functioning well below her baseline and will benefit from skilled therapy intervention to address the above noted impairments. Recommend SNF rehab at discharge. Current Level of Function Impacting Discharge (ADLs/self-care): Max A x2 transfers, min-total A ADLs    Functional Outcome Measure: The patient scored 15/100 on the Barthel Index outcome measure. Other factors to consider for discharge: fall risk, below PLOF     PLAN :  Recommendations and Planned Interventions: self care training, functional mobility training, therapeutic exercise, balance training, therapeutic activities, endurance activities, patient education, home safety training, and family training/education    Frequency/Duration: Patient will be followed by occupational therapy 4 times a week to address goals. Recommendation for discharge: (in order for the patient to meet his/her long term goals)  Therapy up to 5 days/week in SNF setting    This discharge recommendation:  Has been made in collaboration with the attending provider and/or case management    IF patient discharges home will need the following DME: TBD       SUBJECTIVE:   Patient stated I feel ok.     OBJECTIVE DATA SUMMARY:   HISTORY:   Past Medical History:   Diagnosis Date    Dementia ([facility] [address]     Depression     GERD (gastroesophageal reflux disease)     Parkinson disease ([facility] 2015   No past surgical history on file.     Expanded or extensive additional review of patient history:     Home Situation  Home Environment: Private residence  # Steps to Enter: 3  Rails to Enter: Yes  Hand Rails : Bilateral  One/Two Story Residence: Two story, live on 1st floor  Living Alone: No  Support Systems: Child(wale)  Patient Expects to be Discharged to[de-identified] Skilled nursing facility  Current DME Used/Available at Home: Wheelchair, Shower chair, Walker, rollator  Tub or Shower Type: Shower    Hand dominance: Right    EXAMINATION OF PERFORMANCE DEFICITS:  Cognitive/Behavioral Status:  Neurologic State: Alert;Confused  Orientation Level: Oriented to person;Disoriented to time;Disoriented to place; Disoriented to situation  Cognition: Follows commands           Range of Motion:  AROM: Generally decreased, functional          Strength:  Strength: Generally decreased, functional          Coordination:  Coordination: Generally decreased, functional  Fine Motor Skills-Upper: Left Impaired;Right Impaired    Gross Motor Skills-Upper: Left Impaired;Right Impaired (baseline limited B shoulder flexion)    Tone & Sensation:     Sensation: Impaired (reports occassional tingling in hands)       Balance:  Sitting: Impaired; With support  Sitting - Static: Fair (occasional); Poor (constant support)  Sitting - Dynamic: Poor (constant support)  Standing: Impaired; With support  Standing - Static: Poor  Standing - Dynamic : Poor;Constant support    Functional Mobility and Transfers for ADLs:  Bed Mobility:  Supine to Sit: Maximum assistance;Assist x2  Sit to Supine: Maximum assistance;Assist x2  Scooting: Total assistance;Assist x2    Transfers:  Sit to Stand: Moderate assistance;Maximum assistance  Stand to Sit: Moderate assistance;Maximum assistance;Assist x2    ADL Assessment:  Feeding: Moderate assistance;Minimum assistance    Oral Facial Hygiene/Grooming: Moderate assistance;Minimum assistance    Bathing: Total assistance         Upper Body Dressing: Total assistance    Lower Body Dressing: Total assistance    Toileting:  Total assistance         ADL Intervention and task modifications: Grooming  Washing Face: Minimum assistance (semisupine)  Cues: Visual cues provided;Verbal cues provided     Lower Body Dressing Assistance  Socks: Total assistance (dependent)          Functional Measure:    Barthel Index:  Bathin  Bladder: 0  Bowels: 5  Groomin  Dressin  Feedin  Mobility: 0  Stairs: 0  Toilet Use: 0  Transfer (Bed to Chair and Back): 5  Total: 15/100      The Barthel ADL Index: Guidelines  1. The index should be used as a record of what a patient does, not as a record of what a patient could do. 2. The main aim is to establish degree of independence from any help, physical or verbal, however minor and for whatever reason. 3. The need for supervision renders the patient not independent. 4. A patient's performance should be established using the best available evidence. Asking the patient, friends/relatives and nurses are the usual sources, but direct observation and common sense are also important. However direct testing is not needed. 5. Usually the patient's performance over the preceding 24-48 hours is important, but occasionally longer periods will be relevant. 6. Middle categories imply that the patient supplies over 50 per cent of the effort. 7. Use of aids to be independent is allowed. Score Interpretation (from [facility] [address])    Independent   60-79 Minimally independent   40-59 Partially dependent   20-39 Very dependent   <20 Totally dependent     -Krista Orlando., Barthel, D.W. (1965). Functional evaluation: the Barthel Index. [facility] ([address] (1997). The Barthel activities of daily living index: self-reporting versus actual performance in the old (> or = 75 years). Journal of [address] [place] [address] 45(7), [facility], JDEQUAN, Eleonora Miles., Yang Lin. (1999).  Measuring the change in disability after inpatient rehabilitation; comparison of the responsiveness of the Barthel Index and Functional Hollis Measure. Journal of Neurology, Neurosurgery, and Psychiatry, 66(4), 384-108. LENNY Pantoja.A, EMIR Lechuga, & Delia Mauricio M.A. (2004) Assessment of post-stroke quality of life in cost-effectiveness studies: The usefulness of the Barthel Index and the EuroQoL-5D. Quality of Life Research, 15, 252-56        Occupational Therapy Evaluation Charge Determination   History Examination Decision-Making   LOW Complexity : Brief history review  MEDIUM Complexity : 3-5 performance deficits relating to physical, cognitive , or psychosocial skils that result in activity limitations and / or participation restrictions MEDIUM Complexity : Patient may present with comorbidities that affect occupational performnce. Miniml to moderate modification of tasks or assistance (eg, physical or verbal ) with assesment(s) is necessary to enable patient to complete evaluation       Based on the above components, the patient evaluation is determined to be of the following complexity level: LOW   Pain Rating:  Pt reported no pain during session    Activity Tolerance:   Fair and requires rest breaks    After treatment patient left in no apparent distress:    Supine in bed, Call bell within reach, Bed / chair alarm activated, Caregiver / family present, and Side rails x 3    COMMUNICATION/EDUCATION:   The patients plan of care was discussed with: Physical therapist and Registered nurse. Home safety education was provided and the patient/caregiver indicated understanding., Patient/family have participated as able in goal setting and plan of care. , and Patient/family agree to work toward stated goals and plan of care. This patients plan of care is appropriate for delegation to \[facility]\"".     Thank you for this referral.  Amarilis Pickering OT  Time Calculation: 37 mins

## 2023-04-19 NOTE — PROGRESS NOTES
Critical Care Documentation    Name: Hang Avila  YOB: 1950  MRN: [id number]  Admission Date: 4/17/2023  3:04 PM    Date of service: 4/19/2023    Active Diagnoses:    Hospital Problems  Never Reviewed            Codes Class Noted POA    AMS (altered mental status) ICD-10-CM: R41.82  ICD-9-CM: 780.97  4/17/2023 Unknown           Chief Complaint:  Notified with 23 beats of V-tach by nursing staff      Clinical Presentation:  O2 sat 89% in spine position  No chest pain, n/v, dysuria, or abd pain  No lightheadedness, sob, dizziness, heart racing, or skipped beats    Physical Exam:   Alert and orient to self, place, and year, able to recognize the family  Clear in apex with decreased breath sounds at bases  RRR without RMG  No focal neuro deficit      Data Reviewed: All diagnostic labs and studies have been reviewed. Assessment and Plan:  Hx of A-fib and hypertension  Now with SB with hypotension  - 12 lead EKG; reveled SB    Check troponin, BMP, and mg    Ordered TTE    Cardiology team has been consulted; talked to Dr. Lupillo Labmert who was on the floor    Continue with IVF for now    Placed back on supplemental O2 to maintain O2 sat >= 92%      Pt's usual cardiologist; Allison Spain, NP at [address] [place] [address]    -> hx of 9 beats NSVT with lightheadedness, sob, dizziness at that time in 9/2022. Metoprolol was changed to Coreg at that time. Medications Administered:   Sedation: [ ] yes [ x] no   Anxiolytics: [ ] yes [ x] no   Antiarrhythmics: [ ] yes [x ] no   Antihypertensives: [ ] yes [x ] no   Pressors: [ ] yes [x ] no   IVF's: [ x] yes [ ] no       Critical Care Attestation: This patient is unstable and critically ill. Due to a high probability of clinically significant, life threatening deterioration, the patient required my highest level of preparedness to intervene emergently and I personally spent this critical care time directly and personally managing the patient.  This critical care time included obtaining a history; examining the patient; pulse oximetry; ordering and review of studies; arranging urgent treatment with development of a management plan; evaluation of patient's response to treatment; frequent reassessment; and, discussions with other providers and/or family. This critical care time was performed to assess and manage the high probability of imminent, life-threatening deterioration that could result in multi-organ failure and death. It was exclusive of separately billable procedures, treating other patients, and teaching time. Time Spent:     I personally spent 45 minutes in providing critical care time.     Soraya Ramirez NP  4/19/2023  4:55 PM

## 2023-04-19 NOTE — PROGRESS NOTES
Neurology Progress Note     NAME: Jeremy Fernandez   :  1950   MRN:  [id number]   DATE:  2023    Assessment:     Active Problems:    AMS (altered mental status) (2023)      Pt is a 66yo RH female with LBD, PD, with behavorial disturbance and hallucinations at baseline on Seroquel and Nuplazid at home, admitted 23 with acute mental status changes and unable to ambulate. Pt has had a cough for the 2-3 days PTA. There was concern for possible infection and cefepime started on admission, stopped due to worsening AMS. UA is neg for infection, +ketones. BUN 25, Blood cultures neg. COVID-19 and Influenza neg. CTH neg. MRI brain - limited due to motion artifact, no acute stroke, mild CIWM changes. Exam is much better today, near baseline mental status. Acute encephalopathy, possibly due to URI on background of LBD, possibly worsened due to cefepime and being in the ED, now resolved. Plan:   -Continue home Nuplazid and Seroquel.  -Fu with primary neurologist at [facility], Dr. Maicol Ku   -Please call with any questions. Subjective:    Pt is seen with her daughter at bedside. Pt is going much better, back to cognitive baseline. Was able to get up with PT, but daughter is hoping by tomorrow she will be strong enough to return home with [facility] PT/OT.      Objective:   Chart reviewed since last seen    Current Facility-Administered Medications   Medication Dose Route Frequency    vibegron (GEMTESA) tablet 75 mg (Patient Supplied)  75 mg Oral DAILY    amoxicillin-clavulanate (AUGMENTIN) 875-125 mg per tablet 1 Tablet  1 Tablet Oral Q12H    fludrocortisone (FLORINEF) tablet 0.05 mg  0.05 mg Oral BID    QUEtiapine (SEROquel) tablet 50 mg  50 mg Oral QHS    donepeziL (ARICEPT) tablet 10 mg  10 mg Oral DAILY    sodium chloride (NS) flush 5-40 mL  5-40 mL IntraVENous Q8H sodium chloride (NS) flush 5-40 mL  5-40 mL IntraVENous PRN    acetaminophen (TYLENOL) tablet 650 mg  650 mg Oral Q6H PRN    Or    acetaminophen (TYLENOL) suppository 650 mg  650 mg Rectal Q6H PRN    polyethylene glycol (MIRALAX) packet 17 g  17 g Oral DAILY PRN    ondansetron (ZOFRAN ODT) tablet 4 mg  4 mg Oral Q8H PRN    Or    ondansetron (ZOFRAN) injection 4 mg  4 mg IntraVENous Q6H PRN    0.9% sodium chloride infusion  125 mL/hr IntraVENous CONTINUOUS    carbidopa-levodopa ER (SINEMET CR)  mg per tablet 1 Tablet  1 Tablet Oral TID    carvediloL (COREG) tablet 3.125 mg  3.125 mg Oral BID WITH MEALS    apixaban (ELIQUIS) tablet 5 mg  5 mg Oral BID    pimavanserin cap 34 mg (Patient Supplied)  34 mg Oral QPM    pantoprazole (PROTONIX) tablet 40 mg  40 mg Oral ACB       Visit Vitals  /60 (BP 1 Location: Left arm, BP Patient Position: At rest)   Pulse 64   Temp 98.4 °F (36.9 °C)   Resp 19   Ht 5' 7\" (1.702 m)   Wt 210 lb 12.2 oz (95.6 kg)   SpO2 91%   BMI 33.01 kg/m²     Temp (24hrs), Av.5 °F (36.9 °C), Min:97.5 °F (36.4 °C), Max:100.3 °F (37.9 °C)      No intake/output data recorded. No intake/output data recorded. Physical Exam:  General: Well developed well nourished patient in no apparent distress. Cardiac: Regular rate and rhythm  Extremities: 2+ Radial pulses, no cyanosis or edema    Neurological Exam:  Mental Status: Oriented to time, place and person. Speech and language intact. Attention and fund of knowledge appropriate. Normal recent and remote memory. Cranial Nerves:   VFF, EOMI, no nystagmus, no ptosis. Facial movement is symmetric. Hearing is intact   Motor:  5/5 strength in upper and lower proximal and distal muscles. Normal bulk and tone. No PD.  No tremors   Reflexes:      Sensory:      Gait:     Cerebellar:  Intact FTN, KATE          Lab Review   Recent Results (from the past 24 hour(s))   COVID-19 WITH INFLUENZA A/B    Collection Time: 23  5:01 PM   Result Value Ref Range    SARS-CoV-2 by PCR Not detected NOTD      Influenza A by PCR Not detected NOTD      Influenza B by PCR Not detected NOTD         Imaging Review   MRI Results (most recent):  Results from Hospital Encounter encounter on 04/17/23    MRI BRAIN WO CONT    Narrative  EXAM: MRI BRAIN WO CONT    INDICATION: ams    COMPARISON: CT head 4/17/2023. CONTRAST: None. TECHNIQUE:  Multiplanar multisequence acquisition without contrast of the brain. FINDINGS:  Evaluation is significantly limited by motion. The ventricles are normal in size and position. Few scattered periventricular  and deep white matter T2/FLAIR hyperintensities, consistent with mild chronic  microvascular ischemic disease. Small area of chronic hemorrhage in the left  temporo-occipital lobe. There is no acute infarct, hemorrhage, extra-axial fluid  collection, or mass effect. There is no cerebellar tonsillar herniation. Expected arterial flow-voids are present. Mild mucosal thickening in the left maxillary sinus. Small bilateral mastoid  effusions. The orbital contents are within normal limits with bilateral lens  implants. No significant osseous or scalp lesions are identified. Impression  1. Evaluation is significantly limited by motion. No acute intracranial  abnormality. 2. Mild chronic microvascular ischemic disease. CT Results (most recent):  Results from Hospital Encounter encounter on 04/17/23    CT HEAD WO CONT    Narrative  INDICATION: aphasia    Exam: Noncontrast CT of the brain is performed with 5 mm collimation. CT dose reduction was achieved with the use of the standardized protocol  tailored for this examination and automatic exposure control for dose  modulation. Direct comparison is made to prior CT dated February 2021. FINDINGS: There is age-appropriate diffuse cortical atrophy. There is no acute  intracranial hemorrhage, mass, mass effect or herniation.  Ventricular system is  normal. The gray-white matter differentiation is well-preserved. The mastoid air  cells are well pneumatized. The visualized paranasal sinuses are normal.    Impression  No acute intracranial hemorrhage, mass or infarct. Care Plan discussed with:  Patient x   Family x   RN    Care Manager    Consultant/Specialist:  lizzy     Signed: Sheldon Burden MD

## 2023-04-19 NOTE — PROGRESS NOTES
Physician Progress Note      Addy Ayoub  CSN #:                  [id number]  :                       1950  ADMIT DATE:       2023 3:04 PM  [address] [place] DATE:  RESPONDING  PROVIDER #:        Diomedes WHITTINGTON NP          QUERY TEXT:    Pt admitted with AMS and has encephalopathy documented. If possible, please document in progress notes and discharge summary further specificity regarding the type of encephalopathy:      The medical record reflects the following:  Risk Factors: AMS    Clinical Indicators:  neuro consult  Acute encephalopathy, possibly due to URI on background of LBD, possibly worsened overnight due to cefepime,    Treatment:  neuro consult  donepeziL (ARICEPT) tablet 10 mg  cefepime (MAXIPIME) 2 g in 0.9% sodium chloride 10 mL IV syringe  levoFLOXacin (LEVAQUIN) tablet 500 mg    Thank you,  Evangelist Coles RN [facility]  Clinical Documentation  [phone] or via [address] [facility] provided:  -- Metabolic encephalopathy  -- Encephalopathy due to ##, . -- Toxic metabolic encephalopathy  -- Other - I will add my own diagnosis  -- Disagree - Not applicable / Not valid  -- Disagree - Clinically unable to determine / Unknown  -- Refer to Clinical Documentation Reviewer    PROVIDER RESPONSE TEXT:    This patient has metabolic encephalopathy.     Query created by: Ko Calixto on 2023 2:13 PM      Electronically signed by:  Oriana Charles NP 2023 2:18 PM

## 2023-04-19 NOTE — PROGRESS NOTES
Problem: Falls - Risk of  Goal: *Absence of Falls  Description: Document Subha Alvarez Fall Risk and appropriate interventions in the flowsheet.   Outcome: Progressing Towards Goal  Note: Fall Risk Interventions:                                Problem: Patient Education: Go to Patient Education Activity  Goal: Patient/Family Education  Outcome: Progressing Towards Goal     Problem: Patient Education: Go to Patient Education Activity  Goal: Patient/Family Education  Outcome: Progressing Towards Goal     Problem: Delirium Treatment  Goal: *Absence of falls  Outcome: Progressing Towards Goal  Goal: Interventions  Outcome: Progressing Towards Goal     Problem: Patient Education: Go to Patient Education Activity  Goal: Patient/Family Education  Outcome: Progressing Towards Goal

## 2023-04-19 NOTE — PROGRESS NOTES
[facility] [facility]  Hospitalist Group                                                                                          Hospitalist Progress Note  Tomasz Garber NP  Answering service: [phone] or [phone] from in house phone        Date of Service:  2023  NAME:  Dianna Kenney  :  1950  MRN:  [id number]      Admission Summary:   Dianna Kenney is a 68 y.o. female with history of Parkinson's, Lewy body dementia, major depressive disorder, GERD, A-fib on Eliquis who presents to ED accompanied by family for concerns of altered mental status. Patient is seen and examined at bedside. Patient is alert and oriented x4 and appropriately conversant. She states that she feels well and has no acute complaints or concerns. Family at bedside specifically daughter and son-in-law state over the last 2 days, patient has had increasing levels of confusion. She is appropriate at the moment but has not been so consistently for the last 2 days. Family states she has known history of recurrent UTIs and with concern for UTI. She was seen by her urologist today and noted to have mildly low blood pressures and referred to ED. She denies any urinary symptoms at this time. Interval history / Subjective:   Alert and orient to self, place, year, and able to recognize the daughter. C/o generalized weakness    U/A on admission () was negative. No urine cx was not done. Hx of frequent UTI (multiple course of abx therapy), according to the daughter, pt develop mental status change in presence of UTI in the past with negative U/A. Last urine cx at [facility] urology group on 3/24/2023 grew enterococcus faecalis. Pt can complete 5 day abx therapy with Augmentin.    Wean O2 as long as O2 sat is >= 92%  Assessment & Plan:     AMS  Hx of frequent UTI; hx of negative U/A with + urine cx  - T-max 100.3, wbc normal    U/A on admission () was negative, urine cx was not processed     Hx of mental status change with UTI in presence of negative U/A, but + urine cx. Last urine cx grew Enterococcus faecalis which was treated with macrobid. She finished abx less than 2 weeks ago prior to mental status change    Blood cx (4/18) no growth so far      At this time, we recommend to complete total 5 day course of abx therapy with po Augmentin. No other active infectious process at this time. CXR (4/17) no acute pulmonary process    MRI of brain (4/18) Evaluation is significantly limited by motion. No acute intracranial  abnormality. Mild chronic microvascular ischemic disease      Has apparently had waxing and waning mentation at home      Neurology following;     Hypertension  Hx of A-fib  - Continue Coreg & eliquis     Lewy body dementia/Parkinson's/major depressive disorder  - Continue home Sinemet, Aricept, Seroquel, fludrocortisone    Appreciate pharmacy assistance with medications, back on home regimen    PT/OT following    Code status: DNR  Prophylaxis: eliquis  Care Plan discussed with: Pt, pt's two daughters, CM, pt's nurse, and Dr. Sid Vera  Anticipated Disposition: Home, likely [address] [facility] [address] Problems  Never Reviewed            Codes Class Noted POA    AMS (altered mental status) ICD-10-CM: R41.82  ICD-9-CM: 780.97  4/17/2023 Unknown           Review of Systems:   C/o generalized weakness  Mentation at baseline per pt's daughter  No headaches, neck pain, chest pain, sob, sanchez, dysuria, constipation, abd pain, numbness or tingling sensation. Follows commends. Presents limited medical hx    Vital Signs:    Last 24hrs VS reviewed since prior progress note.  Most recent are:  Visit Vitals  BP (!) (P) 141/65 (BP 1 Location: Left arm, BP Patient Position: At rest)   Pulse 61   Temp 100.3 °F (37.9 °C)   Resp 19   Ht 5' 7\" (1.702 m)   Wt 95.6 kg (210 lb 12.2 oz)   SpO2 96%   BMI 33.01 kg/m²       No intake or output data in the 24 hours ending 04/19/23 1233     Physical Examination:     I had a face to face encounter with this patient and independently examined them on 4/19/2023 as outlined below:          Constitutional:  No acute distress, cooperative, pleasant    ENT:  Oral mucosa moist, oropharynx benign. Resp:  CTA bilaterally. No wheezing/rhonchi/rales. No accessory muscle use. CV:  irregular rhythm, rate @ 60's, no murmurs, gallops, rubs    GI:  Soft, non distended, non tender. normoactive bowel sounds     Musculoskeletal:  No edema, warm, 2+ pulses throughout    Neurologic:  Moves all extremities. AAOx3, CN II-XII reviewed, follows commands, tremulous, limited medical hisotrian            Data Review:    Review and/or order of clinical lab test  Review and/or order of tests in the radiology section of CPT  Review and/or order of tests in the medicine section of CPT      Labs:     Recent Labs     04/17/23  1709   WBC 8.2   HGB 13.6   HCT 41.5   *       Recent Labs     04/17/23  1709      K 3.8      CO2 29   BUN 25*   CREA 0.97   *   CA 9.1       No results for input(s): ALT, AP, TBIL, TBILI, TP, ALB, GLOB, GGT, AML, LPSE in the last 72 hours. No lab exists for component: SGOT, GPT, AMYP, HLPSE  No results for input(s): INR, PTP, APTT, INREXT, INREXT in the last 72 hours. No results for input(s): FE, TIBC, PSAT, FERR in the last 72 hours. No results found for: FOL, RBCF   No results for input(s): PH, PCO2, PO2 in the last 72 hours. No results for input(s): CPK, CKNDX, TROIQ in the last 72 hours.     No lab exists for component: CPKMB  No results found for: CHOL, CHOLX, CHLST, CHOLV, HDL, HDLP, LDL, LDLC, DLDLP, TGLX, TRIGL, TRIGP, CHHD, CHHDX  Lab Results   Component Value Date/Time    Glucose (POC) 104 04/17/2023 03:02 PM     Lab Results   Component Value Date/Time    Color DARK YELLOW 04/17/2023 08:49 PM    Appearance CLEAR 04/17/2023 08:49 PM    Specific gravity 1.028 04/17/2023 08:49 PM    Specific gravity 1.010 06/02/2022 01:53 PM    pH (UA) 5.5 04/17/2023 08:49 PM Protein TRACE (A) 04/17/2023 08:49 PM    Glucose Negative 04/17/2023 08:49 PM    Ketone 15 (A) 04/17/2023 08:49 PM    Bilirubin Negative 04/17/2023 08:49 PM    Urobilinogen 0.2 04/17/2023 08:49 PM    Nitrites Negative 04/17/2023 08:49 PM    Leukocyte Esterase Negative 04/17/2023 08:49 PM    Epithelial cells FEW 04/17/2023 08:49 PM    Bacteria Negative 04/17/2023 08:49 PM    WBC 0-4 04/17/2023 08:49 PM    RBC 0-5 04/17/2023 08:49 PM         Medications Reviewed:     Current Facility-Administered Medications   Medication Dose Route Frequency    vibegron (GEMTESA) tablet 75 mg (Patient Supplied)  75 mg Oral DAILY    amoxicillin-clavulanate (AUGMENTIN) 875-125 mg per tablet 1 Tablet  1 Tablet Oral Q12H    [Held by provider] levoFLOXacin (LEVAQUIN) tablet 500 mg  500 mg Oral Q24H    fludrocortisone (FLORINEF) tablet 0.05 mg  0.05 mg Oral BID    QUEtiapine (SEROquel) tablet 50 mg  50 mg Oral QHS    donepeziL (ARICEPT) tablet 10 mg  10 mg Oral DAILY    sodium chloride (NS) flush 5-40 mL  5-40 mL IntraVENous Q8H    sodium chloride (NS) flush 5-40 mL  5-40 mL IntraVENous PRN    acetaminophen (TYLENOL) tablet 650 mg  650 mg Oral Q6H PRN    Or    acetaminophen (TYLENOL) suppository 650 mg  650 mg Rectal Q6H PRN    polyethylene glycol (MIRALAX) packet 17 g  17 g Oral DAILY PRN    ondansetron (ZOFRAN ODT) tablet 4 mg  4 mg Oral Q8H PRN    Or    ondansetron (ZOFRAN) injection 4 mg  4 mg IntraVENous Q6H PRN    0.9% sodium chloride infusion  125 mL/hr IntraVENous CONTINUOUS    carbidopa-levodopa ER (SINEMET CR)  mg per tablet 1 Tablet  1 Tablet Oral TID    carvediloL (COREG) tablet 3.125 mg  3.125 mg Oral BID WITH MEALS    apixaban (ELIQUIS) tablet 5 mg  5 mg Oral BID    pimavanserin cap 34 mg (Patient Supplied)  34 mg Oral QPM    pantoprazole (PROTONIX) tablet 40 mg  40 mg Oral ACB     ______________________________________________________________________  EXPECTED LENGTH OF STAY: - - -  ACTUAL LENGTH OF STAY: 0                 Soraya Sadler, NP

## 2023-04-19 NOTE — PROGRESS NOTES
SHILO: anticipate d/c home with [facility] for PT/OT pending pt/ot recommendations & clinical progression;  Family is considering SNF, choice list provided    [address] added to AVS    Follow up with PCP    Transport to be determined, Family can transport pt if appropriate at d/c    Primary family contact: Daughter/Buddy LESTER, [phone]    DaughterKaruna, [phone]    RUR: 10%  -Neurology following  -PT/OT recs. SNF  Reason for Admission:             Altered Mental Status  History of:  HTN, dementia/parkinson's disease, GERD, AFIB on Eliquis  RUR Score:      10%               Plan for utilizing home health: [facility], no history of prior [facility]    PCP: First and Last name:  Dimple Heimlich, NP     Name of Practice:    Are you a current patient: Yes/No: YES   Approximate date of last visit: within last 6 months   Can you participate in a virtual visit with your PCP: YES                    Current Advanced Directive/Advance Care Plan: DNR      Healthcare Decision Maker:   Click here to complete [address] including selection of the Healthcare Decision Maker Relationship (ie \"Primary\")         DaughterBuddy, [phone]                    Transition of Care Plan:                    [id number] reviewed pt chart and met with pt and Makeda block at bedside to discuss transitional planning. Pt resides with dtr, Nadia Hanks, son-in-law, and two nieces, one who is an EMT. Dtr, Nadia Hanks, Son-in-law and Niece who is an EMT assist pt with adls, like bathing and dressing, pt is functionally independent with mobility with Rollator. Family performs iadls. Other DME is: w/c, bp cuff. Pt uses [facility] on [address] for med with no copay concerns. CM confirmed pcp/demographics/insurance. Prior history of SNF rehab in [place] about 4-5 years ago, no IPR or HH history. CM discussed HH and family has no preference of [facility] provider.  [facility] sent to [facility] [address] in [facility] and noted acceptance, AVS updated accordingly. CM to follow. Noemi Hart RN BSN [facility]  Medicare pt has received, reviewed, and signed 1st IM letter informing them of their right to appeal the discharge. Signed copy has been placed on pt bedside chart. Transition of Care Plan:     The Plan for Transition of Care is related to the following treatment goals:no preference of [facility] provider     The Patient and/or patient representative was provided with a choice of provider and agrees  with the discharge plan. Yes [x] No []    A Freedom of choice list was provided with basic dialogue that supports the patient's individualized plan of care/goals and shares the quality data associated with the providers. Yes [x] No []   Care Management Interventions  PCP Verified by CM: Yes  Palliative Care Criteria Met (RRAT>21 & CHF Dx)?: No  Mode of Transport at Discharge:  Other (see comment) (TO BE DETERMINED)  Transition of Care Consult (CM Consult): Discharge Planning  MyChart Signup: Yes  Discharge Durable Medical Equipment: No  Health Maintenance Reviewed: Yes  Physical Therapy Consult: Yes  Occupational Therapy Consult: Yes  Support Systems: Child(wale)  Confirm Follow Up Transport: Family  The Patient and/or Patient Representative was Provided with a Choice of Provider and Agrees with the Discharge Plan?: Yes  Name of the Patient Representative Who was Provided with a Choice of Provider and Agrees with the Discharge Plan: Margarito Estevez of Choice List was Provided with Basic Dialogue that Supports the Patient's Individualized Plan of Care/Goals, Treatment Preferences and Shares the Quality Data Associated with the Providers?: Yes  Discharge Location  Patient Expects to be Discharged to[de-identified] Home with home health

## 2023-04-19 NOTE — PROGRESS NOTES
Last office visit note 4/17/23:     Fer Lozada is a 68year old female with a history of recurring UTI, dementia, hallucinations exacrbated by UTI. and multi drug resistant organisms. hx of dementia and hallucinations exacerbated by UTI. She is unable to walk on her own due to UTI. She denies any fever but has pain in her neck. She is responsive and not septic. Last seen by Dr. Fady Bain. q-PCR on 3/24/23 was positve for enterococcus bacteria and was treated on macrobid. Renal US from 2/21/22 showed normal kidney and bladder. BP today is 91/69. PAST MEDICAL HISTORY:    Allergies: * SULFA DRUGS (Severe)  DENIES: Latex, Shellfish, X-Ray Dye, Iodine.      Medications: cefuroxime axetil 500 mg tablet (cefuroxime axetil) Take 1 tablet by mouth twice a day   Estrace 0.01% (0.1 mg/gram) cream (estradiol) Apply as directed fingertip size to affected area on Monday, Wednesday, Friday  midodrine 10 mg tablet (midodrine)   carvedilol 3.125 mg tablet (carvedilol)   carbidopa-levodopa  mg tablet extended release (carbidopa-levodopa)   metoprolol succinate 25 mg tablet extended release 24 hr (metoprolol succinate)   Eliquis 5 mg tablet (apixaban)   fludrocortisone 0.1 mg tablet (fludrocortisone)   Gemtesa 75 mg tablet (vibegron) Take 1 tablet by mouth once a day   cephalexin 250 mg tablet (cephalexin) Take 1 tablet by mouth once a day Start this after completing Cefuroxime  quetiapine 25 mg tablet (quetiapine) TAKE 1 TABLET BY MOUTH AT BEDTIME  escitalopram oxalate 10 mg tablet (escitalopram oxalate)   atorvastatin 10 mg tablet (atorvastatin)   * TYLENOL CAPSULE   Miralax 17 gram/dose powder (polyethylene glycol 3350)   * MECLIZINE HCL TABLET   * CALCIUM CAPSULE   potassium chloride 20 mEq tablet,ER particles/crystals (potassium chloride)   * NUPLAZID 17 MG ORAL TABLET   Nuplazid 34 mg capsule (pimavanserin)   * ARICEPT 5 MG DAILY   * MULTIVITAMINS ORAL CAPSULE   aspirin 81 mg tablet,chewable (aspirin) omeprazole 20 mg capsule,delayed release(DR/EC) (omeprazole)   donepezil 10 mg tablet (donepezil)   * POTASSIMIN TABLET     Problems: Hypotension (ICD-458.9) (MPU81-X39.9)  Other microscopic hematuria (XWY17-D31.29)  Elevated blood pressure (ICD-796.2) (QMO76-Q48.0)  Asymptomatic microscopic hematuria (EST06-Q58.21)  Urinary Tract Infection (UTI), recurrent (ICD-599.0) (TYC90-L80.0)  Cystocele (ICD-618.01) (ILZ46-L06.10)  Rectocele (ICD-618.04) (PFU93-U51.6)  Vaginal atrophy (ICD-627.3) (XVG26-L10.2)  Nocturnal enuresis (ICD-788.36) (YDJ03-G86.44)  Urge incontinence (ICD-788.31) (JEX49-E32.41)  UTI (urinary tract infection) (ICD-599.0) (LKB62-I53.0)    Illnesses: None. DENIES: Heart Disease, Pacemaker/Defibrillator, Lung Disease, Diabetes, High Blood Pressure, Stroke/Seizure, Kidney Problems, Bleeding Problems, HIV, Hepatitis, or Cancer. Surgeries: Hysterectomy. Family History: Breast cancer. DENIES: Kidney cancer, Kidney disease, Kidney stones, Uterine cancer, Cervical cancer, Ovarian cancer. Social History: Retired. . Smoking status: never smoker. Does not drink alcohol. System Review: Admits to: Difficulty Walking, Involuntary Urine Loss, and Psychiatric Problems. DENIES: Unexplained Weight Loss, Dry Eyes, Shortness of Breath, Constipation, Lower Extremity Weakness, Impaired Sex Drive, Easy Bleeding. EXAMINATION: Vitals: Pulse: 75 BP: 91/69 Weight: 202 lbs Height: 5' 8\" BMI: 30.83 kg/m^2  Appearance: well-developed NAD ENMT: moist mucous membranes Respiratory: breathing easily Abdomen/Flank: soft, non-tender abdomen, no CVA tenderness, no organomegaly, no ventral hernia, stool for occult blood not indicated Heme/Lymph/Immuno: no adenopathy Musculoskelatal: no deformity Skin: warm and dry Psych: normal affect     DIAGNOSTIC STUDIES:  FINDINGS US 2/1/22:    Right kidney measures 10.5 x 4.0 x 4.8 cm. There is normal cortical thickness and echogenicity.  No stones or hydronephrosis. No cortical renal cysts or solid renal masses. Normal color-flow is identified in the renal hilum on the Doppler portion of the exam.    Left kidney measures 9.6 x 4.2 x 4.7 cm. There is normal cortical thickness and echogenicity. No stones or hydronephrosis. No cortical renal cysts or solid renal masses. Normal color-flow is identified in the renal hilum on the Doppler portion of the exam.    The bladder is adequately distended. Total bladder volume is 122 mL. No wall thickening or calculus is identified. Survey views of the abdominal aorta and proximal IVC are performed. No evidence of abdominal aortic aneurysm. IMPRESSION:    Sonographically normal-appearing kidneys and bladder. ELECTRONICALLY SIGNED BY: Marck Sutton MD      RENAL USOUND              \"Result Below. Annita Fuchs \"        RESULT: Sonographically normal-appearing kidneys and bladder. URINALYSIS  Urine Dip not done  Urine Micro not done    Prescription(s) Today: cefuroxime axetil 500 mg tablet (cefuroxime axetil) Take 1 tablet by mouth twice a day   #14 tablet x 0,  04/17/2023, Eva Stringer MA, SIGNED  Estrace 0.01% (0.1 mg/gram) cream (estradiol) Apply as directed fingertip size to affected area on Monday, Wednesday, Friday  #42.5 gram x 1,  04/17/2023, Eva Stringer MA, SIGNED    IMPRESSION:    1. URINARY TRACT INFECTION (UTI) - RECURRENT (NQJ12-A31.0) - Unchanged: Will switch from Macrobid to [address]. Reviewed symptoms of febrile UTI and advied to seek emergent care if this is suspected. Will repeat renal US. 2. HYPOTENSION (TRV88-Q35.9) - New: ER consult. 3. VAGINAL ATROPHY (BPV56-M50.2) - Unchanged: Discussed preventative treatment with topical vaginal ointments. Start estrace cream, applied 2-3 times a day.         cc: Get Perez MD  Today's Services  E&M Service    Upcoming Orders  Return Office Visit - with Patricia Manuel MD, FACS in 2 weeks  UA        Electronically signed by Patricia Manuel MD FACS on 04/18/2023 at 12:57 PM    ________________________________________________________________________    Urine qPCR 3/24/23:    BACTERIAL LOAD Medium (10 ^5 -10 ^7 )  Escherichia coli Not Detected  Klebsiella pneumoniae Not Detected  Proteus mirabilis Not Detected  Pseudomonas aeruginosa Not Detected  Staphylococcus aureus Not Detected  Streptococcus agalactiae Not Detected  Enterococcus faecalis 1.07 x 104  Candida albicans Not Detected     Amikacin, Gentamicin SUSCEPTIBLE  Augmentin, Cefdinir, Ceftin, SUSCEPTIBLE  Levofloxacin, Ciprofloxacin SUSCEPTIBLE  Tetracycline, Doxycycline SUSCEPTIBLE  Bactrim, Trimethoprim SUSCEPTIBLE  Macrobid SUSCEPTIBLE  Monurol SUSCEPTIBLE

## 2023-04-19 NOTE — CONSULTS
[facility] MD 4/19/2023   Cardiology Care Note                  [x]Initial visit     []Established visit     Patient Name: Khadra Fowler - UBX:[birth year] - PKO:[id number] 68 y.o. Reason for initial visit: 23 beat run of wide-complex tachycardia     Assessment and Plan       1. Run of wide-complex tachycardia possible ventricular tachycardia. Check echocardiogram and keep electrolytes normal range. A later time plan ischemia work-up with her primary cardiologist at [facility]. We will check records  Review shows  this is not new as of the previous monitor there was an NSVT at 9 beats that she wore for [facility]. This was discussed with [facility] EP and felt given her difficulties with her Parkinson's and dementia recommend treating with low-dose metoprolol  2. PAF by history has been Coreg restarted need to check records of issues with possible anticoagulation. Looks ot have been on Eliquis. 3.  Altered mental status Lewy body dementia followed by neurology has acute AMS consider exacerbation to UTI  4. Labile hypertension     SUBJECTIVE:        HPI: Khadra Fowler is a 68 y.o. female with a history atrial fibrillation. Patient family at bedside reports that she went to emergency room with tachycardia in October. That information was transmitted to her neurologist at [facility] and she saw [facility] cardiology with a 30 Day Loop monitor placed. Patient is able to get some history reports shortness of breath with sweating episodes over the past 3 to 4 weeks but persistent previously 2 over the last 1 week she become more obtunded has had significant issues with mental status. Dr. Caridad Anthony is seen the patient and noted multiple neuropsychiatric issues. The patient reports that she stopped the Coreg because around last Thursday and things began to worsen at that point in terms of how she felt. The nurse practitioner for internal medicine Owen Castle has asked us to see the patient for 23 beat run of wide complex tachycardia. It appears to be somewhat regular may be a Martin Passy C but is more likely to be ventricular tachycardia based on the 2-lead no twelve-lead is available. Patient denies chest pain edema tachycardia palpitations-reports no prior history of CAD. Patient presents today with a change in cognitively. UA is negative patient empirically treated for UTI she is appreciative of services on nucleoside and cervical MRI of the brain showed no acute stroke  Admitted with weakness and AMS, mental status seem to worsen on cefepime  Past cardiac history:  PAF saw nurse practitioner Adan friedman vascular of 2 apixaban was prescribed EKG 9/19/2022 showed A-fib with RVR the patient asymptomatic that time a 30-day M cot showed no A-fib less than 1% PVCs. She is to continue apixaban an echo scheduled she had issues with hypotension present on fludrocortisone and midodrine.   Also NSVT was previously seen and discussed with Dr. Nico Garcia of electrophysiology at [facility]  Past medical history:  Chronic mental status changes has LBD Lewy body dementia Dr. Kraig Collins at [facility] GERD depression Parkinson psychosis   non-smoker  Interval,overnight:   Patient Vitals for the past 12 hrs:   Temp Pulse Resp BP SpO2   04/19/23 1901 -- (!) 59 17 (!) 100/48 98 %   04/19/23 1858 -- 66 23 (!) 91/45 98 %   04/19/23 1848 -- 60 17 (!) 97/41 99 %   04/19/23 1812 -- 63 -- -- --   04/19/23 1807 99 °F (37.2 °C) 63 21 (!) 91/47 94 %   04/19/23 1646 -- 62 -- (!) 99/50 --   04/19/23 1645 -- 63 -- -- --   04/19/23 1630 -- 61 13 (!) 91/39 95 %   04/19/23 1400 -- 64 -- -- --   04/19/23 1349 98.4 °F (36.9 °C) 60 19 123/60 91 %   04/19/23 1200 -- 61 -- -- --   04/19/23 1039 -- 64 -- (!) 141/65 --   04/19/23 1017 100.3 °F (37.9 °C) 61 19 (!) 143/66 96 %   04/19/23 1016 -- 61 -- -- --   04/19/23 1009 -- 64 -- (!) 143/66 -- ___________________________________________________________  No specialty comments available. Cardiac testing          Most recent HS troponins:  Recent Labs     04/19/23  1640 04/17/23  1709   TROPHS 20 13     Lab Results   Component Value Date/Time    Creatinine 0.78 04/19/2023 04:40 PM     Lab Results   Component Value Date/Time    HGB 13.6 04/17/2023 05:09 PM      Physical Exam  Vitals: Patient Vitals for the past 12 hrs:   Temp Pulse Resp BP SpO2   04/19/23 1901 -- (!) 59 17 (!) 100/48 98 %   04/19/23 1858 -- 66 23 (!) 91/45 98 %   04/19/23 1848 -- 60 17 (!) 97/41 99 %   04/19/23 1812 -- 63 -- -- --   04/19/23 1807 99 °F (37.2 °C) 63 21 (!) 91/47 94 %   04/19/23 1646 -- 62 -- (!) 99/50 --   04/19/23 1645 -- 63 -- -- --   04/19/23 1630 -- 61 13 (!) 91/39 95 %   04/19/23 1400 -- 64 -- -- --   04/19/23 1349 98.4 °F (36.9 °C) 60 19 123/60 91 %   04/19/23 1200 -- 61 -- -- --   04/19/23 1039 -- 64 -- (!) 141/65 --   04/19/23 1017 100.3 °F (37.9 °C) 61 19 (!) 143/66 96 %   04/19/23 1016 -- 61 -- -- --   04/19/23 1009 -- 64 -- (!) 143/66 --      Telemetry: normal sinus rhythm  General:    Alert, cooperative, no distress, appears stated age. Neck:   Supple  and no JVD. Back:     Symmetric   Lungs:     Clear to auscultation bilaterally. Heart[de-identified]    Regular rate and rhythm  No murmur   S1, S2 normal, no click, rub or gallop. Abdomen:     Soft, non-distended   Extremities:   Extremities normal, atraumatic, no cyanosis or edema.    Skin:   Skin color normal. No systemic rashes or lesions on visible areas   Neurologic:   Alert, Moves all extremities      ECG:   Results for orders placed or performed during the hospital encounter of 04/17/23   EKG, 12 LEAD, INITIAL   Result Value Ref Range    Ventricular Rate 59 BPM    Atrial Rate 59 BPM    P-R Interval 142 ms    QRS Duration 94 ms    Q-T Interval 486 ms    QTC Calculation (Bezet) 481 ms    Calculated P Axis 16 degrees    Calculated R Axis 16 degrees    Calculated T Axis 26 degrees    Diagnosis       Sinus bradycardia  Nonspecific ST abnormality  When compared with ECG of 18-APR-2023 13:50,  MANUAL COMPARISON REQUIRED, DATA IS UNCONFIRMED         Review of Systems  [x]All other systems reviewed and all negative except as written in HPI  [] Patient unable to provide secondary to condition   has a past medical history of Dementia ([facility] [place] [address] Depression, GERD (gastroesophageal reflux disease), and Parkinson disease ([facility] [place] [address] (2015). has no past surgical history on file. Social Hx:  reports that she has never smoked. She has never used smokeless tobacco. She reports that she does not drink alcohol and does not use drugs. Family Hx: family history is not on file. Allergies   Allergen Reactions    Bactrim [Sulfamethoprim] Other (comments)     Vision change    Sulfa (Sulfonamide Antibiotics) Other (comments)       OBJECTIVE:Exam as above   Wt Readings from Last 3 Encounters:   04/18/23 210 lb 12.2 oz (95.6 kg)   06/02/22 199 lb (90.3 kg)   02/26/21 215 lb 2.7 oz (97.6 kg)     No intake or output data in the 24 hours ending 04/19/23 1904   Data Review:   Labs:  No results for input(s): CPK, TROIQ in the last 72 hours. No lab exists for component: CKQMB, CPKMB, BMPP  Recent Labs     04/19/23  1640 04/17/23  1709    141   K 3.0* 3.8   * 107   CO2 29 29   BUN 15 25*   CREA 0.78 0.97   * 101*   CA 8.3* 9.1     Recent Labs     04/17/23  1709   WBC 8.2   HGB 13.6   HCT 41.5   *     No results for input(s): PTP, INR, AP, INREXT in the last 72 hours. No lab exists for component: PTTP, GPT, SGOT  No results for input(s): CHOL, LDLC in the last 72 hours. No lab exists for component: TGL, HDLC,  HBA1C  No results for input(s): CRP, TSH, TSHEXT in the last 72 hours.     No lab exists for component: ESR  Radiology: XR Results (most recent):  Results from Hospital Encounter encounter on 04/17/23    XR CHEST PORT    Narrative  INDICATION: Cough    Portable AP views of the chest.    Direct comparison made to prior chest x-ray dated June 2022. Cardiomediastinal silhouette is stable. Lungs are clear bilaterally. Pleural  spaces are normal and there is no pneumothorax. Osseous structures are intact. Impression  No acute cardiopulmonary disease.     Current meds:    Current Facility-Administered Medications:     vibegron (GEMTESA) tablet 75 mg (Patient Supplied), 75 mg, Oral, DAILY, Ko Hyunsun D, NP, 75 mg at 04/19/23 1015    amoxicillin-clavulanate (AUGMENTIN) 875-125 mg per tablet 1 Tablet, 1 Tablet, Oral, Q12H, Soraya Rose, NP    QUEtiapine (SEROquel) tablet 25 mg, 25 mg, Oral, QHS, Soraya Rose, NP    fludrocortisone (FLORINEF) tablet 0.05 mg, 0.05 mg, Oral, BID, Milligram, Deadra Dayhoff, PA-C, 0.05 mg at 04/19/23 1246    donepeziL (ARICEPT) tablet 10 mg, 10 mg, Oral, DAILY, Milligram, Deadra Dayhoff, PA-C, 10 mg at 04/19/23 1010    sodium chloride (NS) flush 5-40 mL, 5-40 mL, IntraVENous, Q8H, Checo Hyde MD, 10 mL at 04/19/23 1349    sodium chloride (NS) flush 5-40 mL, 5-40 mL, IntraVENous, PRN, Checo Hyde MD    acetaminophen (TYLENOL) tablet 650 mg, 650 mg, Oral, Q6H PRN, 650 mg at 04/19/23 1021 **OR** acetaminophen (TYLENOL) suppository 650 mg, 650 mg, Rectal, Q6H PRN, Checo Hyde MD    polyethylene glycol (MIRALAX) packet 17 g, 17 g, Oral, DAILY PRN, Checo yHde MD    ondansetron (ZOFRAN ODT) tablet 4 mg, 4 mg, Oral, Q8H PRN **OR** ondansetron (ZOFRAN) injection 4 mg, 4 mg, IntraVENous, Q6H PRN, Checo Hyde MD    0.9% sodium chloride infusion, 100 mL/hr, IntraVENous, CONTINUOUS, Soraya Rose NP, Last Rate: 100 mL/hr at 04/19/23 1644, 100 mL/hr at 04/19/23 1644    carbidopa-levodopa ER (SINEMET CR)  mg per tablet 1 Tablet, 1 Tablet, Oral, TID, Checo Hyde MD, 1 Tablet at 04/19/23 1806    carvediloL (COREG) tablet 3.125 mg, 3.125 mg, Oral, BID WITH MEALS, Soraya Rose NP, 3.125 mg at 04/19/23 1009    apixaban (ELIQUIS) tablet 5 mg, 5 mg, Oral, BID, Checo Hyde MD, 5 mg at 04/19/23 1008    pimavanserin cap 34 mg (Patient Supplied), 34 mg, Oral, QPM, Checo Hyde MD, 34 mg at 04/19/23 1806    pantoprazole (PROTONIX) tablet 40 mg, 40 mg, Oral, ACB, Checo Hyde MD, 40 mg at 04/19/23 1008  Medications Prior to Admission   Medication Sig    acetaminophen (TYLENOL) 500 mg tablet Take 2 Tablets by mouth every twelve (12) hours. 0900, 1700  Indications: pain    escitalopram oxalate (LEXAPRO) 10 mg tablet Take 1 Tablet by mouth daily. 0900    estradioL (ESTRACE) 0.01 % (0.1 mg/gram) vaginal cream Insert 2 g into vagina daily. AZO CRANBERRY 616-35-99 mg-mg-million tab Take 1,000 mg by mouth daily. 0900    melatonin 10 mg chew Take 20 mg by mouth nightly. 2100    Eliquis 5 mg tablet 1 Tablet two (2) times a day. 0900, 1700    carbidopa-levodopa ER (SINEMET CR)  mg per tablet 1 Tablet three (3) times daily. 0900, 1200, 1700    carvediloL (COREG) 3.125 mg tablet Take 1 Tablet by mouth two (2) times daily (with meals). 0900, 1700    donepeziL (ARICEPT) 10 mg tablet Take 1 Tablet by mouth daily. 0900    fludrocortisone (FLORINEF) 0.1 mg tablet Take 0.5 Tablets by mouth two (2) times a day. 0900, 1200    omeprazole (PRILOSEC) 20 mg capsule 1 Capsule every other day. 0900    potassium chloride (K-DUR, KLOR-CON M20) 20 mEq tablet 1 Tablet daily. 0900    QUEtiapine (SEROquel) 25 mg tablet Take 2 Tablets by mouth nightly. 2100    trospium (SANCTURA XL) 60 mg capsule 1 Capsule every evening. 1700    Nuplazid 34 mg cap Take 34 mg by mouth every evening. 1700  Indications: psychosis associated with Parkinson's disease    Gemtesa 75 mg tablet 1 Tablet daily.  0900  Indications: a condition where the urge to urinate results in urine leakage, overactive bladder      Patient Care Team:  Mary Hardy NP as PCP - General (Nurse Practitioner)   CT Results (most recent):  Results from [place] encounter on 04/17/23    CT HEAD WO CONT    Narrative  INDICATION: aphasia    Exam: Noncontrast CT of the brain is performed with 5 mm collimation. CT dose reduction was achieved with the use of the standardized protocol  tailored for this examination and automatic exposure control for dose  modulation. Direct comparison is made to prior CT dated February 2021. FINDINGS: There is age-appropriate diffuse cortical atrophy. There is no acute  intracranial hemorrhage, mass, mass effect or herniation. Ventricular system is  normal. The gray-white matter differentiation is well-preserved. The mastoid air  cells are well pneumatized. The visualized paranasal sinuses are normal.    Impression  No acute intracranial hemorrhage, mass or infarct.     Erika Elder MD  [facility] [address] [place] [address], [address] [place] [address],[address] [place] [address]  [phone]

## 2023-04-20 ENCOUNTER — APPOINTMENT (OUTPATIENT)
Dept: NON INVASIVE DIAGNOSTICS | Age: 73
DRG: 689 | End: 2023-04-20
Attending: NURSE PRACTITIONER
Payer: MEDICARE

## 2023-04-20 LAB
ECHO AO ROOT DIAM: 3.3 CM
ECHO AO ROOT INDEX: 1.6 CM/M2
ECHO AV PEAK GRADIENT: 9 MMHG
ECHO AV PEAK VELOCITY: 1.5 M/S
ECHO AV VELOCITY RATIO: 0.67
ECHO LA DIAMETER INDEX: 2.72 CM/M2
ECHO LA DIAMETER: 5.6 CM
ECHO LA TO AORTIC ROOT RATIO: 1.7
ECHO LA VOL 2C: 90 ML (ref 22–52)
ECHO LA VOL 4C: 105 ML (ref 22–52)
ECHO LA VOLUME AREA LENGTH: 106 ML
ECHO LA VOLUME INDEX A2C: 44 ML/M2 (ref 16–34)
ECHO LA VOLUME INDEX A4C: 51 ML/M2 (ref 16–34)
ECHO LA VOLUME INDEX AREA LENGTH: 51 ML/M2 (ref 16–34)
ECHO LV E' LATERAL VELOCITY: 11 CM/S
ECHO LV E' SEPTAL VELOCITY: 8 CM/S
ECHO LV FRACTIONAL SHORTENING: 38 % (ref 28–44)
ECHO LV INTERNAL DIMENSION DIASTOLE INDEX: 2.04 CM/M2
ECHO LV INTERNAL DIMENSION DIASTOLIC: 4.2 CM (ref 3.9–5.3)
ECHO LV INTERNAL DIMENSION SYSTOLIC INDEX: 1.26 CM/M2
ECHO LV INTERNAL DIMENSION SYSTOLIC: 2.6 CM
ECHO LV IVSD: 0.8 CM (ref 0.6–0.9)
ECHO LV MASS 2D: 101.3 G (ref 67–162)
ECHO LV MASS INDEX 2D: 49.2 G/M2 (ref 43–95)
ECHO LV POSTERIOR WALL DIASTOLIC: 0.8 CM (ref 0.6–0.9)
ECHO LV RELATIVE WALL THICKNESS RATIO: 0.38
ECHO LVOT PEAK GRADIENT: 4 MMHG
ECHO LVOT PEAK VELOCITY: 1 M/S
ECHO MV A VELOCITY: 0.71 M/S
ECHO MV AREA PHT: 3.4 CM2
ECHO MV E DECELERATION TIME (DT): 220.6 MS
ECHO MV E VELOCITY: 0.76 M/S
ECHO MV E/A RATIO: 1.07
ECHO MV E/E' LATERAL: 6.91
ECHO MV E/E' RATIO (AVERAGED): 8.2
ECHO MV E/E' SEPTAL: 9.5
ECHO MV PRESSURE HALF TIME (PHT): 64 MS
ECHO RV TAPSE: 2.9 CM (ref 1.7–?)
ECHO TV REGURGITANT MAX VELOCITY: 2.67 M/S
ECHO TV REGURGITANT PEAK GRADIENT: 28 MMHG

## 2023-04-20 PROCEDURE — 97530 THERAPEUTIC ACTIVITIES: CPT

## 2023-04-20 PROCEDURE — 74011250637 HC RX REV CODE- 250/637: Performed by: STUDENT IN AN ORGANIZED HEALTH CARE EDUCATION/TRAINING PROGRAM

## 2023-04-20 PROCEDURE — 93306 TTE W/DOPPLER COMPLETE: CPT

## 2023-04-20 PROCEDURE — 74011250637 HC RX REV CODE- 250/637: Performed by: PHYSICIAN ASSISTANT

## 2023-04-20 PROCEDURE — 77010033678 HC OXYGEN DAILY

## 2023-04-20 PROCEDURE — 65270000046 HC RM TELEMETRY

## 2023-04-20 PROCEDURE — 94760 N-INVAS EAR/PLS OXIMETRY 1: CPT

## 2023-04-20 PROCEDURE — 74011000250 HC RX REV CODE- 250: Performed by: STUDENT IN AN ORGANIZED HEALTH CARE EDUCATION/TRAINING PROGRAM

## 2023-04-20 PROCEDURE — 74011250636 HC RX REV CODE- 250/636: Performed by: NURSE PRACTITIONER

## 2023-04-20 PROCEDURE — 99232 SBSQ HOSP IP/OBS MODERATE 35: CPT | Performed by: SPECIALIST

## 2023-04-20 PROCEDURE — 74011000258 HC RX REV CODE- 258

## 2023-04-20 PROCEDURE — 74011250637 HC RX REV CODE- 250/637: Performed by: NURSE PRACTITIONER

## 2023-04-20 PROCEDURE — 93306 TTE W/DOPPLER COMPLETE: CPT | Performed by: SPECIALIST

## 2023-04-20 PROCEDURE — 74011250636 HC RX REV CODE- 250/636

## 2023-04-20 RX ORDER — POTASSIUM CHLORIDE 750 MG/1
40 TABLET, FILM COATED, EXTENDED RELEASE ORAL
Status: COMPLETED | OUTPATIENT
Start: 2023-04-20 | End: 2023-04-20

## 2023-04-20 RX ORDER — AMOXICILLIN AND CLAVULANATE POTASSIUM 875; 125 MG/1; MG/1
1 TABLET, FILM COATED ORAL EVERY 12 HOURS
Status: DISCONTINUED | OUTPATIENT
Start: 2023-04-20 | End: 2023-04-21 | Stop reason: HOSPADM

## 2023-04-20 RX ADMIN — AMOXICILLIN AND CLAVULANATE POTASSIUM 1 TABLET: 875; 125 TABLET, FILM COATED ORAL at 21:26

## 2023-04-20 RX ADMIN — PIPERACILLIN AND TAZOBACTAM 3.38 G: 3; .375 INJECTION, POWDER, LYOPHILIZED, FOR SOLUTION INTRAVENOUS at 04:30

## 2023-04-20 RX ADMIN — CARBIDOPA AND LEVODOPA 1 TABLET: 25; 100 TABLET, EXTENDED RELEASE ORAL at 12:29

## 2023-04-20 RX ADMIN — PANTOPRAZOLE SODIUM 40 MG: 40 TABLET, DELAYED RELEASE ORAL at 07:03

## 2023-04-20 RX ADMIN — APIXABAN 5 MG: 5 TABLET, FILM COATED ORAL at 19:02

## 2023-04-20 RX ADMIN — SODIUM CHLORIDE, PRESERVATIVE FREE 10 ML: 5 INJECTION INTRAVENOUS at 21:33

## 2023-04-20 RX ADMIN — CARBIDOPA AND LEVODOPA 1 TABLET: 25; 100 TABLET, EXTENDED RELEASE ORAL at 08:40

## 2023-04-20 RX ADMIN — QUETIAPINE FUMARATE 25 MG: 25 TABLET ORAL at 21:26

## 2023-04-20 RX ADMIN — SODIUM CHLORIDE, PRESERVATIVE FREE 10 ML: 5 INJECTION INTRAVENOUS at 13:43

## 2023-04-20 RX ADMIN — CARVEDILOL 3.12 MG: 3.12 TABLET, FILM COATED ORAL at 17:54

## 2023-04-20 RX ADMIN — SODIUM CHLORIDE 100 ML/HR: 9 INJECTION, SOLUTION INTRAVENOUS at 21:26

## 2023-04-20 RX ADMIN — FLUDROCORTISONE ACETATE 0.05 MG: 0.1 TABLET ORAL at 08:39

## 2023-04-20 RX ADMIN — APIXABAN 5 MG: 5 TABLET, FILM COATED ORAL at 08:40

## 2023-04-20 RX ADMIN — SODIUM CHLORIDE, PRESERVATIVE FREE 10 ML: 5 INJECTION INTRAVENOUS at 07:03

## 2023-04-20 RX ADMIN — POTASSIUM CHLORIDE 40 MEQ: 750 TABLET, FILM COATED, EXTENDED RELEASE ORAL at 16:36

## 2023-04-20 RX ADMIN — FLUDROCORTISONE ACETATE 0.05 MG: 0.1 TABLET ORAL at 12:29

## 2023-04-20 RX ADMIN — CARVEDILOL 3.12 MG: 3.12 TABLET, FILM COATED ORAL at 08:40

## 2023-04-20 RX ADMIN — DONEPEZIL HYDROCHLORIDE 10 MG: 10 TABLET ORAL at 08:40

## 2023-04-20 RX ADMIN — CARBIDOPA AND LEVODOPA 1 TABLET: 25; 100 TABLET, EXTENDED RELEASE ORAL at 19:02

## 2023-04-20 RX ADMIN — PIPERACILLIN AND TAZOBACTAM 3.38 G: 3; .375 INJECTION, POWDER, LYOPHILIZED, FOR SOLUTION INTRAVENOUS at 12:29

## 2023-04-20 NOTE — PROGRESS NOTES
0000: Bedside and Verbal shift change report given to 1330 Hattie King (oncoming nurse) by Aldo Chaney LPN (offgoing nurse). Report included the following information SBAR, Cardiac Rhythm NSR-SB, and Alarm Parameters . 0800: Bedside and Verbal shift change report given to [employer] (oncoming nurse) by 1330 Hattie King (offgoing nurse). Report included the following information SBAR, MAR, Recent Results, and Cardiac Rhythm NSR-SB .

## 2023-04-20 NOTE — PROGRESS NOTES
Problem: Mobility Impaired (Adult and Pediatric)  Goal: *Acute Goals and Plan of Care (Insert Text)  Description: FUNCTIONAL STATUS PRIOR TO ADMISSION: The patient was living at home with daughter and JOON. Several family members are involved in her care. She has a history of parkinson's disease and lewy body dementia. Pt was able to ambulate independently in the home and mod I using a walker in the community. Family assist with some bathing and dressing. Physical Therapy Goals  Initiated 4/19/2023  1. Patient will move from supine to sit and sit to supine, scoot up and down, and roll side to side in bed with moderate assistance  within 7 day(s). 2.  Patient will transfer from bed to chair and chair to bed with maximal assistance using the least restrictive device within 7 day(s). 3.  Patient will perform sit to stand with moderate assistance  within 7 day(s). 4.  Patient will ambulate with maximal assistance for 10 feet with the least restrictive device within 7 day(s). Outcome: Progressing Towards Goal  PHYSICAL THERAPY TREATMENT  Patient: Mary Jeffers (83 y.o. female)  Date: 4/20/2023  Diagnosis: AMS (altered mental status) [R41.82] <principal problem not specified>      Precautions: Fall  Chart, physical therapy assessment, plan of care and goals were reviewed. ASSESSMENT  Patient continues with skilled PT services and is progressing slowly towards goals. Patient presents with confusion, generalized weakness, impaired balance, poor activity tolerance, tremors, and overall decline in functional mobility. She transferred supine<>sit with maxA x2 and demonstrated poor sitting balance. She transferred sit<>stand and pivoted bed>chair with modA x2. Pt required assistance for initiation and weight shifting. VSS and pt ended session seated in chair with all needs met.   Recommending SNF upon discharge    Current Level of Function Impacting Discharge (mobility/balance): modA x2 for bed>chair transfer, maxA x2 for bed mobility     Other factors to consider for discharge: baseline Parkinson's disease and lewy body dementia, fall risk, supportive family          PLAN :  Patient continues to benefit from skilled intervention to address the above impairments. Continue treatment per established plan of care. to address goals. Recommendation for discharge: (in order for the patient to meet his/her long term goals)  Therapy up to 5 days/week in SNF setting    This discharge recommendation:  Has been made in collaboration with the attending provider and/or case management    IF patient discharges home will need the following DME: to be determined (TBD)       SUBJECTIVE:   Patient stated Getting me up is impossible.     OBJECTIVE DATA SUMMARY:   Critical Behavior:  Neurologic State: Alert, Confused  Orientation Level: Oriented to person, Disoriented to situation, Disoriented to time, Oriented to place  Cognition: Follows commands     Functional Mobility Training:  Bed Mobility:  Supine to Sit: Maximum assistance;Assist x2  Sit to Supine: Maximum assistance;Bed Modified     Transfers:  Sit to Stand: Moderate assistance;Assist x2  Stand to Sit: Moderate assistance;Assist x2  Bed to Chair: Moderate assistance;Assist x2       Balance:  Sitting: Impaired; With support  Sitting - Static: Fair (occasional)  Sitting - Dynamic: Poor (constant support)  Standing: Impaired; With support  Standing - Static: Poor  Standing - Dynamic : Poor;Constant support    Activity Tolerance:   Poor    After treatment patient left in no apparent distress:   Sitting in chair, Call bell within reach, Bed / chair alarm activated, and Caregiver / family present    COMMUNICATION/COLLABORATION:   The patients plan of care was discussed with: Occupational therapist and Registered nurse.      Medina Garcia, PT, DPT   Time Calculation: 25 mins

## 2023-04-20 NOTE — PROGRESS NOTES
SHILO: anticipate d/c to SNF, referral pending in [facility] to:   [facility]    May need a Rapid Covid test pending SNF policy    CM has set up pt with [facility] for RN/PT/OT & Bridge to Home Program for [address] aides clinical progression;  Family is considering SNF, choice list provided, however they would prefer pt return home with [facility]    Dtr, Seth Hart serves as pt's Caregiver     [address] added to AVS     Follow up with PCP     Transport to be determined, Family can transport pt if appropriate at d/c     Primary family contact: Daughter/POChiquis GIL, [phone]     Daughter, Ed Zavaleta, [phone]     RUR: 10%  -Neurology following  -Cardiology following, echo pending result  -PT/OT recs. SNF, family is considering [facility] vs SNF  -1600-CM reviewed pt chart & discussed pt case with Attending who advised that family is leaning toward taking pt home with [facility] as opposed to SNF and he asked that CM add HHAs and RN. CM uploaded RN/HHA orders to [facility] for [facility]. CM to follow. 1640-CM discussed SNF choices with dtr, Seth Hart and she is agreeable to [facility], referral sent in [facility]. CM to follow. Herb Chirinos RN BSN CCM  Transition of Care Plan:     The Plan for Transition of Care is related to the following treatment goals: [facility] SN[facility]    The Patient and/or patient representative  was provided with a choice of provider and agrees  with the discharge plan. Yes [x] No []    A Freedom of choice list was provided with basic dialogue that supports the patient's individualized plan of care/goals and shares the quality data associated with the providers.        Yes [x] No []

## 2023-04-20 NOTE — PROGRESS NOTES
[address] [facility]  Hospitalist Group                                                                                          Hospitalist Progress Note  Partha Guerrero NP  Answering service: [phone] or [phone] from in house phone        Date of Service:  2023  NAME:  Rojas Hernandez  :  1950  MRN:  [id number]      Admission Summary:   Rojas Hernandez is a 68 y.o. female with history of Parkinson's, Lewy body dementia, major depressive disorder, GERD, A-fib on Eliquis who presents to ED accompanied by family for concerns of altered mental status. Patient is seen and examined at bedside. Patient is alert and oriented x4 and appropriately conversant. She states that she feels well and has no acute complaints or concerns. Family at bedside specifically daughter and son-in-law state over the last 2 days, patient has had increasing levels of confusion. She is appropriate at the moment but has not been so consistently for the last 2 days. Family states she has known history of recurrent UTIs and with concern for UTI. She was seen by her urologist today and noted to have mildly low blood pressures and referred to ED. She denies any urinary symptoms at this time. Interval history / Subjective:   Alert and orient to self, place, year, and able to recognize the daughter. C/o generalized weakness    U/A on admission () was negative. No urine cx was not done. Hx of frequent UTI (multiple course of abx therapy), according to the daughter, pt develop mental status change in presence of UTI in the past with negative U/A. Last urine cx at  [facility] urology group on 3/24/2023 grew enterococcus faecalis. Pt can complete 5 day abx therapy with Augmentin.    Wean O2 as long as O2 sat is >= 92%    Assessment & Plan:     AMS - at baseline  Hx of frequent UTI; hx of negative U/A with + urine cx  - afebrile, wbc normal    U/A on admission () was negative, urine cx was not processed     Hx of mental status change with UTI in presence of negative U/A, but + urine cx. Last urine cx grew Enterococcus faecalis which was treated with macrobid. She finished abx less than 2 weeks ago prior to mental status change    Blood cx (4/18) no growth so far      At this time, we recommend to complete total 5 day course of abx therapy with po Augmentin. No other active infectious process at this time. CXR (4/17) no acute pulmonary process    MRI of brain (4/18) Evaluation is significantly limited by motion. No acute intracranial  abnormality. Mild chronic microvascular ischemic disease      Has apparently had waxing and waning mentation at home      Neurology following;     Hypertension  Hx of A-fib  Episode of V-tach on 4/20  -  TTE (4/20) Normal left ventricular systolic function with a visually estimated EF of 55 - 60%. Left ventricle size is normal. Normal wall thickness. Normal wall motion. Normal diastolic function. Mitral Valve: Mild to moderate regurgitation. Left Atrium: Left atrium is severely dilated. Continue Coreg & eliquis     evaluated by cardiologist on 4/19 and signed off on 4/20    Episode of hypotension   - resolved after hydration    Lewy body dementia/Parkinson's/major depressive disorder  - Continue home Sinemet, Aricept, Seroquel, fludrocortisone    Appreciate pharmacy assistance with medications, back on home regimen    PT/OT following    Code status: DNR  Prophylaxis: eliquis  Care Plan discussed with: Pt, pt's jose KC, pt's nurse, and Dr. Chiquis Mcneil  Anticipated Disposition: Home, likely [address] Problems  Never Reviewed            Codes Class Noted POA    AMS (altered mental status) ICD-10-CM: R41.82  ICD-9-CM: 780.97  4/17/2023 Unknown         Review of Systems:   C/o generalized weakness  Mentation at baseline per pt's daughter  No headaches, neck pain, chest pain, sob, sanchez, dysuria, constipation, abd pain, numbness or tingling sensation. Follows commends.  Presents limited medical hx    Vital Signs:    Last 24hrs VS reviewed since prior progress note. Most recent are:  Visit Vitals  BP (!) 142/81 (BP 1 Location: Left lower arm, BP Patient Position: At rest)   Pulse 60   Temp 98.8 °F (37.1 °C)   Resp 21   Ht 5' 7\" (1.702 m)   Wt 95.6 kg (210 lb 12.2 oz)   SpO2 95%   BMI 33.01 kg/m²         Intake/Output Summary (Last 24 hours) at 4/20/2023 0748  Last data filed at 4/20/2023 0430  Gross per 24 hour   Intake 1000 ml   Output --   Net 1000 ml        Physical Examination:     I had a face to face encounter with this patient and independently examined them on 4/20/2023 as outlined below:          Constitutional:  No acute distress, cooperative, pleasant    ENT:  Oral mucosa moist, oropharynx benign. Resp:  CTA bilaterally. No wheezing/rhonchi/rales. No accessory muscle use. CV:  Regular, no murmurs, gallops, rubs    GI:  Soft, non distended, non tender. normoactive bowel sounds     Musculoskeletal:  No edema, warm, 2+ pulses throughout    Neurologic:  Moves all extremities. AAOx3, follows commands, tremulous, limited medical hisotrian            Data Review:    Review and/or order of clinical lab test  Review and/or order of tests in the radiology section of CPT  Review and/or order of tests in the medicine section of CPT      Labs:     Recent Labs     04/17/23  1709   WBC 8.2   HGB 13.6   HCT 41.5   *       Recent Labs     04/19/23  1640 04/17/23  1709    141   K 3.0* 3.8   * 107   CO2 29 29   BUN 15 25*   CREA 0.78 0.97   * 101*   CA 8.3* 9.1   MG 1.7  --        No results for input(s): ALT, AP, TBIL, TBILI, TP, ALB, GLOB, GGT, AML, LPSE in the last 72 hours. No lab exists for component: SGOT, GPT, AMYP, HLPSE  No results for input(s): INR, PTP, APTT, INREXT, INREXT in the last 72 hours. No results for input(s): FE, TIBC, PSAT, FERR in the last 72 hours.    No results found for: FOL, RBCF   No results for input(s): PH, PCO2, PO2 in the last 72 hours. No results for input(s): CPK, CKNDX, TROIQ in the last 72 hours.     No lab exists for component: CPKMB  No results found for: CHOL, CHOLX, CHLST, CHOLV, HDL, HDLP, LDL, LDLC, DLDLP, TGLX, TRIGL, TRIGP, CHHD, CHHDX  Lab Results   Component Value Date/Time    Glucose (POC) 104 04/17/2023 03:02 PM     Lab Results   Component Value Date/Time    Color DARK YELLOW 04/17/2023 08:49 PM    Appearance CLEAR 04/17/2023 08:49 PM    Specific gravity 1.028 04/17/2023 08:49 PM    Specific gravity 1.010 06/02/2022 01:53 PM    pH (UA) 5.5 04/17/2023 08:49 PM    Protein TRACE (A) 04/17/2023 08:49 PM    Glucose Negative 04/17/2023 08:49 PM    Ketone 15 (A) 04/17/2023 08:49 PM    Bilirubin Negative 04/17/2023 08:49 PM    Urobilinogen 0.2 04/17/2023 08:49 PM    Nitrites Negative 04/17/2023 08:49 PM    Leukocyte Esterase Negative 04/17/2023 08:49 PM    Epithelial cells FEW 04/17/2023 08:49 PM    Bacteria Negative 04/17/2023 08:49 PM    WBC 0-4 04/17/2023 08:49 PM    RBC 0-5 04/17/2023 08:49 PM         Medications Reviewed:     Current Facility-Administered Medications   Medication Dose Route Frequency    vibegron (GEMTESA) tablet 75 mg (Patient Supplied)  75 mg Oral DAILY    QUEtiapine (SEROquel) tablet 25 mg  25 mg Oral QHS    piperacillin-tazobactam (ZOSYN) 3.375 g in 0.9% sodium chloride (MBP/ADV) 100 mL MBP  3.375 g IntraVENous Q8H    fludrocortisone (FLORINEF) tablet 0.05 mg  0.05 mg Oral BID    donepeziL (ARICEPT) tablet 10 mg  10 mg Oral DAILY    sodium chloride (NS) flush 5-40 mL  5-40 mL IntraVENous Q8H    sodium chloride (NS) flush 5-40 mL  5-40 mL IntraVENous PRN    acetaminophen (TYLENOL) tablet 650 mg  650 mg Oral Q6H PRN    Or    acetaminophen (TYLENOL) suppository 650 mg  650 mg Rectal Q6H PRN    polyethylene glycol (MIRALAX) packet 17 g  17 g Oral DAILY PRN    ondansetron (ZOFRAN ODT) tablet 4 mg  4 mg Oral Q8H PRN    Or    ondansetron (ZOFRAN) injection 4 mg  4 mg IntraVENous Q6H PRN    0.9% sodium chloride infusion  100 mL/hr IntraVENous CONTINUOUS    carbidopa-levodopa ER (SINEMET CR)  mg per tablet 1 Tablet  1 Tablet Oral TID    carvediloL (COREG) tablet 3.125 mg  3.125 mg Oral BID WITH MEALS    apixaban (ELIQUIS) tablet 5 mg  5 mg Oral BID    pimavanserin cap 34 mg (Patient Supplied)  34 mg Oral QPM    pantoprazole (PROTONIX) tablet 40 mg  40 mg Oral ACB     ______________________________________________________________________  EXPECTED LENGTH OF STAY: - - -  ACTUAL LENGTH OF STAY:          1                 Soraya Rose, NP

## 2023-04-20 NOTE — PROGRESS NOTES
Bedside shift change report given to [employer] (oncoming nurse) by Kiki Gottlieb (offgoing nurse). Report included the following information SBAR, Accordion, Recent Results, and Med Rec Status.

## 2023-04-20 NOTE — PROGRESS NOTES
Problem: Self Care Deficits Care Plan (Adult)  Goal: *Acute Goals and Plan of Care (Insert Text)  Description: FUNCTIONAL STATUS PRIOR TO ADMISSION: The patient lived with her daughter and son in law, and between them and additional family members provide 24/7 supervision. PMH of Parkinson's disease and lewy body dementia. She typically ambulates in the home independently, is mod I for community mobility with RW. Family provides assistance for bathing and LB dressing, pt can typically feed herself and perform portions of dressing. HOME SUPPORT: The patient lived with her daughter and brother in law. Occupational Therapy Goals  Initiated 4/19/2023  1. Patient will perform grooming with supervision/set-up in supported sit within 7 day(s). 2.  Patient will sit EOB x5 minutes with CGA within 7 day(s). 3.  Patient will perform upper body dressing with minimal assistance within 7 day(s). 4.  Patient will perform toilet transfers with moderate assistance  within 7 day(s). 5.  Patient will perform all aspects of toileting with moderate assistance  within 7 day(s). 6.  Patient will participate in upper extremity therapeutic exercise/activities with minimal assistance for 5 minutes within 7 day(s). Outcome: Progressing Towards Goal     OCCUPATIONAL THERAPY TREATMENT  Patient: Danielle Deshpande (51 y.o. female)  Date: 4/20/2023  Diagnosis: AMS (altered mental status) [R41.82] <principal problem not specified>      Precautions: Fall  Chart, occupational therapy assessment, plan of care, and goals were reviewed. ASSESSMENT  Patient is progressing in OT goals but remains limited by disorientation to time, mild confusion, impaired balance, impaired motor initiation, freezing episodes, impaired coordination, impaired functional reach to LB, and general weakness. Patient followed simple commands and participated well to her ability with facilitation.  She required moderate assistance x2  + additional time for supine-sit and stand-pivot OOB to chair and remains below baseline. Continue to recommend SNF at d/c. Current Level of Function Impacting Discharge (ADLs): Moderate assistance x2 + additional time for supine-sit and stand-pivot OOB to chair, Infer overall minimum to maximum assistance UB ADLs and total assistance LB ADLs         PLAN :  Patient continues to benefit from skilled intervention to address the above impairments. Continue treatment per established plan of care to address goals. Recommendation for discharge: (in order for the patient to meet his/her long term goals)  Therapy up to 5 days/week in SNF setting    This discharge recommendation:  Has been made in collaboration with the attending provider and/or case management         SUBJECTIVE:   Patient stated     OBJECTIVE DATA SUMMARY:   Cognitive/Behavioral Status:  Neurologic State: Alert;Confused  Orientation Level: Oriented to person;Oriented to place;Oriented to situation;Disoriented to time  Cognition: Follows commands             Functional Mobility and Transfers for ADLs:  Bed Mobility:  Supine to Sit: Maximum assistance;Assist x2  Sit to Supine: Maximum assistance;Bed Modified    Transfers:  Sit to Stand: Moderate assistance;Assist x2     Bed to Chair: Moderate assistance;Assist x2    Balance:  Sitting: Impaired; With support  Sitting - Static: Fair (occasional)  Sitting - Dynamic: Poor (constant support)  Standing: Impaired; With support  Standing - Static: Poor  Standing - Dynamic : Poor;Constant support    Pain:  Patient did not report pain    Activity Tolerance:   Fair    After treatment patient left in no apparent distress:   Sitting in chair, Call bell within reach, Bed / chair alarm activated, and Caregiver / family present    COMMUNICATION/COLLABORATION:   The patients plan of care was discussed with: Physical therapist and Registered nurse.      Isaiah Mendez OT  Time Calculation: 24 mins

## 2023-04-20 NOTE — PROGRESS NOTES
Problem: Falls - Risk of  Goal: *Absence of Falls  Description: Document Luis Eduardo Cobble Fall Risk and appropriate interventions in the flowsheet. Outcome: Progressing Towards Goal  Note: Fall Risk Interventions:                                Problem: Pressure Injury - Risk of  Goal: *Prevention of pressure injury  Description: Document Sanjeev Scale and appropriate interventions in the flowsheet. Outcome: Progressing Towards Goal  Note: Pressure Injury Interventions:  Sensory Interventions: Assess changes in LOC, Check visual cues for pain, Minimize linen layers, Turn and reposition approx.  every two hours (pillows and wedges if needed)    Moisture Interventions: Absorbent underpads, Check for incontinence Q2 hours and as needed, Maintain skin hydration (lotion/cream), Minimize layers    Activity Interventions: Pressure redistribution bed/mattress(bed type), Increase time out of bed, PT/OT evaluation    Mobility Interventions: Assess need for specialty bed, Pressure redistribution bed/mattress (bed type), PT/OT evaluation    Nutrition Interventions: Document food/fluid/supplement intake    Friction and Shear Interventions: Minimize layers, Transferring/repositioning devices, Apply protective barrier, creams and emollients                Problem: Delirium Treatment  Goal: *Absence of falls  Outcome: Progressing Towards Goal  Goal: Interventions  Outcome: Progressing Towards Goal     Problem: Patient Education: Go to Patient Education Activity  Goal: Patient/Family Education  Outcome: Progressing Towards Goal

## 2023-04-20 NOTE — PROGRESS NOTES
[address] [facility] MD 4/20/2023   Cardiology Care Note                  [x]Initial visit     []Established visit     Patient Name: Suzanna Rodríguez - UVR:7/80/8413 - TDK:[id number] 68 y.o. Reason for initial visit: 23 beat run of wide-complex tachycardia  Interval, overnight: Patient now sitting up in room with family at bedside. She denies any dizziness on standing chest pain or shortness of breath. She says she feels better. Telemetry review shows an 8 beat run of ventricular tachycardia. Echo is pending   Assessment and Plan     Impression:   Overall await echo. If echo is normal this ventricular ectopy and runs of NSVT are not new and patient can follow-up with [facility] EP and general cardiology. Her main issues for admission related to her dementia and possible UTI. Once echo is normal no further comment. Thank you    1. Run of wide-complex tachycardia possible ventricular tachycardia. Check echocardiogram and keep electrolytes normal range. A later time plan ischemia work-up with her primary cardiologist at [facility]. Review shows  this is not new as of the previous monitor there was an NSVT at 9 beats that she wore for [facility]. This was discussed with [facility] and felt given her difficulties with her Parkinson's and dementia recommend treating with low-dose metoprolol    2. PAF by history has been Coreg restarted need to check records of issues with possible anticoagulation. Looks ot have been on Eliquis. 3.  Altered mental status Lewy body dementia followed by neurology has acute AMS consider exacerbation to UTI    4. Labile hypertension  Now running low,has urosepsis   SUBJECTIVE:        previously HPI: Suzanna Rodríguez is a 68 y.o. female with a history atrial fibrillation. Patient family at bedside reports that she went to emergency room with tachycardia in October. That information was transmitted to her neurologist at [address] [place] [address] and she saw [address] [place] [address] cardiology with a 30 Day Loop monitor placed. Patient is able to get some history reports shortness of breath with sweating episodes over the past 3 to 4 weeks but persistent previously 2 over the last 1 week she become more obtunded has had significant issues with mental status. Dr. Shadi Molina is seen the patient and noted multiple neuropsychiatric issues. The patient reports that she stopped the Coreg because around last Thursday and things began to worsen at that point in terms of how she felt. The nurse practitioner for internal medicine Kate Kendall has asked us to see the patient for 23 beat run of wide complex tachycardia. It appears to be somewhat regular may be a Darcia Mellow C but is more likely to be ventricular tachycardia based on the 2-lead no twelve-lead is available. Patient denies chest pain edema tachycardia palpitations-reports no prior history of CAD. Patient presents today with a change in cognitively. UA is negative patient empirically treated for UTI she is appreciative of services on nucleoside and cervical MRI of the brain showed no acute stroke  Admitted with weakness and AMS, mental status seem to worsen on cefepime  Past cardiac history:  PAF saw nurse practitioner Monet friedman vascular of 2 apixaban was prescribed EKG 9/19/2022 showed A-fib with RVR the patient asymptomatic that time a 30-day M cot showed no A-fib less than 1% PVCs. She is to continue apixaban an echo scheduled she had issues with hypotension present on fludrocortisone and midodrine.   Also NSVT was previously seen and discussed with Dr. Gisela Guadarrama of electrophysiology at [address] [place] [address]  Past medical history:  Chronic mental status changes has LBD Lewy body dementia Dr. Shabana Ramos at [address] [place] [address] GERD depression Parkinson psychosis   non-smoker  Interval,overnight:   Patient Vitals for the past 12 hrs:   Temp Pulse Resp BP SpO2   04/20/23 1339 99.1 °F (37.3 °C) 69 23 (!) 104/59 93 %   04/20/23 1200 -- 76 -- -- --   04/20/23 1050 -- -- -- (!) 125/58 --   04/20/23 1000 -- 85 -- -- --   04/20/23 0949 99.1 °F (37.3 °C) 65 20 (!) 125/58 94 %   04/20/23 0840 -- 63 -- (!) 147/67 --   04/20/23 0600 98.8 °F (37.1 °C) 60 21 (!) 142/81 95 %   04/20/23 0550 -- 63 -- -- --   04/20/23 0351 -- 64 -- -- --        ___________________________________________________________  No specialty comments available. Cardiac testing          Most recent HS troponins:  Recent Labs     04/19/23  1640 04/17/23  1709   TROPHS 20 13       Lab Results   Component Value Date/Time    Creatinine 0.78 04/19/2023 04:40 PM     Lab Results   Component Value Date/Time    HGB 13.6 04/17/2023 05:09 PM      Physical Exam  Vitals: Patient Vitals for the past 12 hrs:   Temp Pulse Resp BP SpO2   04/20/23 1339 99.1 °F (37.3 °C) 69 23 (!) 104/59 93 %   04/20/23 1200 -- 76 -- -- --   04/20/23 1050 -- -- -- (!) 125/58 --   04/20/23 1000 -- 85 -- -- --   04/20/23 0949 99.1 °F (37.3 °C) 65 20 (!) 125/58 94 %   04/20/23 0840 -- 63 -- (!) 147/67 --   04/20/23 0600 98.8 °F (37.1 °C) 60 21 (!) 142/81 95 %   04/20/23 0550 -- 63 -- -- --   04/20/23 0351 -- 64 -- -- --        Telemetry: normal sinus rhythm  General:    Alert, cooperative, no distress, appears stated age. Neck:   Supple  and no JVD. Back:     Symmetric   Lungs:     Clear to auscultation bilaterally. Heart[de-identified]    Regular rate and rhythm  No murmur   S1, S2 normal, no click, rub or gallop. Abdomen:     Soft, non-distended   Extremities:   Extremities normal, atraumatic, no cyanosis or edema.    Skin:   Skin color normal. No systemic rashes or lesions on visible areas   Neurologic:   Alert, Moves all extremities      ECG:   Results for orders placed or performed during the hospital encounter of 04/17/23   EKG, 12 LEAD, INITIAL   Result Value Ref Range    Ventricular Rate 59 BPM    Atrial Rate 59 BPM    P-R Interval 142 ms    QRS Duration 94 ms    Q-T Interval 486 ms    QTC Calculation (Bezet) 481 ms    Calculated P Axis 16 degrees    Calculated R Axis 16 degrees    Calculated T Axis 26 degrees    Diagnosis       Sinus bradycardia  Nonspecific ST abnormality  When compared with ECG of 18-APR-2023 13:50,  MANUAL COMPARISON REQUIRED, DATA IS UNCONFIRMED         Review of Systems  [x]All other systems reviewed and all negative except as written in HPI  [] Patient unable to provide secondary to condition   has a past medical history of Dementia ([facility] [address] Depression, GERD (gastroesophageal reflux disease), and Parkinson disease ([facility] [address] (2015). has no past surgical history on file. Social Hx:  reports that she has never smoked. She has never used smokeless tobacco. She reports that she does not drink alcohol and does not use drugs. Family Hx: family history is not on file. Allergies   Allergen Reactions    Bactrim [Sulfamethoprim] Other (comments)     Vision change    Sulfa (Sulfonamide Antibiotics) Other (comments)       OBJECTIVE:Exam as above   Wt Readings from Last 3 Encounters:   04/20/23 209 lb 7 oz (95 kg)   06/02/22 199 lb (90.3 kg)   02/26/21 215 lb 2.7 oz (97.6 kg)       Intake/Output Summary (Last 24 hours) at 4/20/2023 1426  Last data filed at 4/20/2023 0430  Gross per 24 hour   Intake 1000 ml   Output --   Net 1000 ml      Data Review:   Labs:  No results for input(s): CPK, TROIQ in the last 72 hours. No lab exists for component: CKQMB, CPKMB, BMPP  Recent Labs     04/19/23  1640 04/17/23  1709    141   K 3.0* 3.8   * 107   CO2 29 29   BUN 15 25*   CREA 0.78 0.97   * 101*   CA 8.3* 9.1       Recent Labs     04/17/23  1709   WBC 8.2   HGB 13.6   HCT 41.5   *       No results for input(s): PTP, INR, AP, INREXT, INREXT in the last 72 hours. No lab exists for component: PTTP, GPT, SGOT  No results for input(s): CHOL, LDLC in the last 72 hours.     No lab exists for component: TGL, HDLC,  HBA1C  No results for input(s): CRP, TSH, TSHEXT, TSHEXT in the last 72 hours. No lab exists for component: ESR  Radiology: XR Results (most recent):  Results from Hospital Encounter encounter on 04/17/23    XR CHEST PORT    Narrative  PORTABLE CHEST RADIOGRAPH/S: 4/19/2023 7:37 PM    INDICATION: Fever    COMPARISON: 4/17/2023, 6/2/2022, CT chest 6/2/2022. TECHNIQUE: Portable frontal semiupright radiograph/s of the chest.    FINDINGS:  The lungs are clear. The central airways are patent. No pneumothorax or pleural  effusion. Post L2 kyphoplasty. Impression  Clear lungs.     Current meds:    Current Facility-Administered Medications:     vibegron (GEMTESA) tablet 75 mg (Patient Supplied), 75 mg, Oral, DAILY, Soraya Rose, NP, 75 mg at 04/20/23 0841    QUEtiapine (SEROquel) tablet 25 mg, 25 mg, Oral, QHS, Soraya Rose, NP, 25 mg at 04/19/23 2109    piperacillin-tazobactam (ZOSYN) 3.375 g in 0.9% sodium chloride (MBP/ADV) 100 mL MBP, 3.375 g, IntraVENous, Q8H, Chelly Doshi NP, Last Rate: 25 mL/hr at 04/20/23 1229, 3.375 g at 04/20/23 1229    fludrocortisone (FLORINEF) tablet 0.05 mg, 0.05 mg, Oral, BID, Milligram, Francie De Luna PA-C, 0.05 mg at 04/20/23 1229    donepeziL (ARICEPT) tablet 10 mg, 10 mg, Oral, DAILY, Milligram, MAIKOL PhanC, 10 mg at 04/20/23 0840    sodium chloride (NS) flush 5-40 mL, 5-40 mL, IntraVENous, Q8H, Checo Hyde MD, 10 mL at 04/20/23 1343    sodium chloride (NS) flush 5-40 mL, 5-40 mL, IntraVENous, PRN, Checo Hyde MD    acetaminophen (TYLENOL) tablet 650 mg, 650 mg, Oral, Q6H PRN, 650 mg at 04/19/23 1021 **OR** acetaminophen (TYLENOL) suppository 650 mg, 650 mg, Rectal, Q6H PRN, Checo Hyde MD    polyethylene glycol (MIRALAX) packet 17 g, 17 g, Oral, DAILY PRN, Checo Hyde MD    ondansetron (ZOFRAN ODT) tablet 4 mg, 4 mg, Oral, Q8H PRN **OR** ondansetron (ZOFRAN) injection 4 mg, 4 mg, IntraVENous, Q6H PRN, Checo Hyde MD    0.9% sodium chloride infusion, 100 mL/hr, IntraVENous, CONTINUOUS, Soraya Rose, NP, Last Rate: 100 mL/hr at 04/19/23 1644, 100 mL/hr at 04/19/23 1644    carbidopa-levodopa ER (SINEMET CR)  mg per tablet 1 Tablet, 1 Tablet, Oral, TID, Checo Hyde MD, 1 Tablet at 04/20/23 1229    carvediloL (COREG) tablet 3.125 mg, 3.125 mg, Oral, BID WITH MEALS, Soraya Rose NP, 3.125 mg at 04/20/23 0840    apixaban (ELIQUIS) tablet 5 mg, 5 mg, Oral, BID, Checo Hyde MD, 5 mg at 04/20/23 0840    pimavanserin cap 34 mg (Patient Supplied), 34 mg, Oral, QPM, Checo Hyde MD, 34 mg at 04/19/23 1806    pantoprazole (PROTONIX) tablet 40 mg, 40 mg, Oral, ACB, Checo Hyde MD, 40 mg at 04/20/23 0703  Medications Prior to Admission   Medication Sig    acetaminophen (TYLENOL) 500 mg tablet Take 2 Tablets by mouth every twelve (12) hours. 0900, 1700  Indications: pain    escitalopram oxalate (LEXAPRO) 10 mg tablet Take 1 Tablet by mouth daily. 0900    estradioL (ESTRACE) 0.01 % (0.1 mg/gram) vaginal cream Insert 2 g into vagina daily. AZO CRANBERRY 069-63-12 mg-mg-million tab Take 1,000 mg by mouth daily. 0900    melatonin 10 mg chew Take 20 mg by mouth nightly. 2100    Eliquis 5 mg tablet 1 Tablet two (2) times a day. 0900, 1700    carbidopa-levodopa ER (SINEMET CR)  mg per tablet 1 Tablet three (3) times daily. 0900, 1200, 1700    carvediloL (COREG) 3.125 mg tablet Take 1 Tablet by mouth two (2) times daily (with meals). 0900, 1700    donepeziL (ARICEPT) 10 mg tablet Take 1 Tablet by mouth daily. 0900    fludrocortisone (FLORINEF) 0.1 mg tablet Take 0.5 Tablets by mouth two (2) times a day. 0900, 1200    omeprazole (PRILOSEC) 20 mg capsule 1 Capsule every other day. 0900    potassium chloride (K-DUR, KLOR-CON M20) 20 mEq tablet 1 Tablet daily. 0900    QUEtiapine (SEROquel) 25 mg tablet Take 2 Tablets by mouth nightly. 2100    trospium (SANCTURA XL) 60 mg capsule 1 Capsule every evening. 1700    Nuplazid 34 mg cap Take 34 mg by mouth every evening.  1700  Indications: psychosis associated with Parkinson's disease    Gemtesa 75 mg tablet 1 Tablet daily. 0900  Indications: a condition where the urge to urinate results in urine leakage, overactive bladder      Patient Care Team:  Teresita Cheema NP as PCP - General (Nurse Practitioner)   CT Results (most recent):  Results from [place] encounter on 04/17/23    CT HEAD WO CONT    Narrative  INDICATION: aphasia    Exam: Noncontrast CT of the brain is performed with 5 mm collimation. CT dose reduction was achieved with the use of the standardized protocol  tailored for this examination and automatic exposure control for dose  modulation. Direct comparison is made to prior CT dated February 2021. FINDINGS: There is age-appropriate diffuse cortical atrophy. There is no acute  intracranial hemorrhage, mass, mass effect or herniation. Ventricular system is  normal. The gray-white matter differentiation is well-preserved. The mastoid air  cells are well pneumatized. The visualized paranasal sinuses are normal.    Impression  No acute intracranial hemorrhage, mass or infarct.     Jer Magaña MD  [facility] [address] [place] [address], [address] [place] [address],[address]  [facility]  [phone]

## 2023-04-21 VITALS
WEIGHT: 210.1 LBS | OXYGEN SATURATION: 96 % | DIASTOLIC BLOOD PRESSURE: 92 MMHG | RESPIRATION RATE: 18 BRPM | TEMPERATURE: 98 F | SYSTOLIC BLOOD PRESSURE: 137 MMHG | HEIGHT: 67 IN | BODY MASS INDEX: 32.98 KG/M2 | HEART RATE: 64 BPM

## 2023-04-21 PROBLEM — I10 HYPERTENSION: Status: ACTIVE | Noted: 2023-04-21

## 2023-04-21 PROBLEM — G20 PARKINSON DISEASE (HCC): Status: ACTIVE | Noted: 2023-04-21

## 2023-04-21 PROBLEM — F32.9 MAJOR DEPRESSIVE DISORDER: Status: ACTIVE | Noted: 2023-04-21

## 2023-04-21 PROBLEM — Z86.79 HX OF VENTRICULAR TACHYCARDIA: Status: ACTIVE | Noted: 2023-04-21

## 2023-04-21 PROBLEM — I48.0 PAROXYSMAL ATRIAL FIBRILLATION (HCC): Status: ACTIVE | Noted: 2023-04-21

## 2023-04-21 PROBLEM — F02.80 LEWY BODY DEMENTIA (HCC): Status: ACTIVE | Noted: 2023-04-21

## 2023-04-21 PROBLEM — G20.A1 PARKINSON DISEASE (HCC): Status: ACTIVE | Noted: 2023-04-21

## 2023-04-21 PROBLEM — R41.82 AMS (ALTERED MENTAL STATUS): Status: RESOLVED | Noted: 2023-04-17 | Resolved: 2023-04-21

## 2023-04-21 PROBLEM — G31.83 LEWY BODY DEMENTIA (HCC): Status: ACTIVE | Noted: 2023-04-21

## 2023-04-21 PROBLEM — Z87.440 HX: UTI (URINARY TRACT INFECTION): Status: ACTIVE | Noted: 2023-04-21

## 2023-04-21 LAB
CALCULATED R AXIS, ECG10: 35 DEGREES
CALCULATED T AXIS, ECG11: 82 DEGREES
DIAGNOSIS, 93000: NORMAL
Q-T INTERVAL, ECG07: 432 MS
QRS DURATION, ECG06: 82 MS
QTC CALCULATION (BEZET), ECG08: 449 MS
VENTRICULAR RATE, ECG03: 65 BPM

## 2023-04-21 PROCEDURE — 74011000250 HC RX REV CODE- 250: Performed by: STUDENT IN AN ORGANIZED HEALTH CARE EDUCATION/TRAINING PROGRAM

## 2023-04-21 PROCEDURE — 74011250637 HC RX REV CODE- 250/637: Performed by: NURSE PRACTITIONER

## 2023-04-21 PROCEDURE — 74011250637 HC RX REV CODE- 250/637: Performed by: PHYSICIAN ASSISTANT

## 2023-04-21 PROCEDURE — 74011250636 HC RX REV CODE- 250/636: Performed by: NURSE PRACTITIONER

## 2023-04-21 PROCEDURE — 74011250637 HC RX REV CODE- 250/637: Performed by: STUDENT IN AN ORGANIZED HEALTH CARE EDUCATION/TRAINING PROGRAM

## 2023-04-21 RX ADMIN — SODIUM CHLORIDE 100 ML/HR: 9 INJECTION, SOLUTION INTRAVENOUS at 06:29

## 2023-04-21 RX ADMIN — CARBIDOPA AND LEVODOPA 1 TABLET: 25; 100 TABLET, EXTENDED RELEASE ORAL at 09:26

## 2023-04-21 RX ADMIN — FLUDROCORTISONE ACETATE 0.05 MG: 0.1 TABLET ORAL at 09:26

## 2023-04-21 RX ADMIN — CARVEDILOL 3.12 MG: 3.12 TABLET, FILM COATED ORAL at 09:27

## 2023-04-21 RX ADMIN — APIXABAN 5 MG: 5 TABLET, FILM COATED ORAL at 09:27

## 2023-04-21 RX ADMIN — FLUDROCORTISONE ACETATE 0.05 MG: 0.1 TABLET ORAL at 12:25

## 2023-04-21 RX ADMIN — DONEPEZIL HYDROCHLORIDE 10 MG: 10 TABLET ORAL at 09:26

## 2023-04-21 RX ADMIN — PANTOPRAZOLE SODIUM 40 MG: 40 TABLET, DELAYED RELEASE ORAL at 06:30

## 2023-04-21 RX ADMIN — CARBIDOPA AND LEVODOPA 1 TABLET: 25; 100 TABLET, EXTENDED RELEASE ORAL at 12:25

## 2023-04-21 RX ADMIN — AMOXICILLIN AND CLAVULANATE POTASSIUM 1 TABLET: 875; 125 TABLET, FILM COATED ORAL at 09:27

## 2023-04-21 RX ADMIN — CARVEDILOL 3.12 MG: 3.12 TABLET, FILM COATED ORAL at 17:34

## 2023-04-21 RX ADMIN — SODIUM CHLORIDE, PRESERVATIVE FREE 10 ML: 5 INJECTION INTRAVENOUS at 06:29

## 2023-04-21 NOTE — PROGRESS NOTES
Transition of Care Plan  RUR- Low  9%  DISPOSITION: SNF - [facility]  RN to call report to #[phone]; [facility] Room #106A  F/U with PCP/Specialist    Transport: [facility]    Emergency contact: Daughter/Eliud LESTER, [phone]    [place] barriers to discharge: placement    [facility] CM spoke with patient's daughter, Theone Rubinstein to inform her and request further SNF choices. Her next choice was [facility], they also do not have any beds available per Admissions. CM Informed patient's daughter and requested further choices. Patient's daughter, Theone Rubinstein stated they were leaning towards placement for SNF rather than home given x2 assist to get patient out of bed. 1138: CM spoke with patient's daughter, Theone Rubinstein who provided 2 additional choices of [facility] and [address] [facility] of [place]. Referrals sent. Message left for [facility] Admissions [phone]. Message left for [facility] #[phone].    2029: [facility] does not have a bed available today for patient. [address] [facility] does have a bed for patient after 1600. CM spoke with patient's daughter, Theone Rubinstein and she is in agreement with discharge today to [facility]. RN to call report to [phone]; [facility] Room #106A    CM spoke with patient's daughter, Theone Rubinstein. They prefer BLS transport at discharge. /PatientFamily verbalized understanding that insurance will be billed for trip, however CM is not able to guarantee 100% coverage for trip. PCS form copied and placed on patient's hard chart. [facility] requested for 1600. Medicare pt has received, reviewed, and signed 2nd IM letter informing them of their right to appeal the discharge. Signed copy has been placed on pt bedside chart. [facility] Medina M.S.JAREK.

## 2023-04-21 NOTE — PROGRESS NOTES
Problem: Falls - Risk of  Goal: *Absence of Falls  Description: Document Dao Larson Fall Risk and appropriate interventions in the flowsheet. 4/21/2023 1457 by Bsihop Rogers  Outcome: Progressing Towards Goal  Note: Fall Risk Interventions:                             4/21/2023 1031 by Bishop Rogers  Outcome: Progressing Towards Goal  Note: Fall Risk Interventions:                                Problem: Patient Education: Go to Patient Education Activity  Goal: Patient/Family Education  4/21/2023 1457 by Bishop Rogers  Outcome: Progressing Towards Goal  4/21/2023 1031 by Bishop Rogers  Outcome: Progressing Towards Goal     Problem: Pressure Injury - Risk of  Goal: *Prevention of pressure injury  Description: Document Sanjeev Scale and appropriate interventions in the flowsheet.   4/21/2023 1457 by Bishop Rogers  Outcome: Progressing Towards Goal  Note: Pressure Injury Interventions:  Sensory Interventions: Assess changes in LOC, Discuss PT/OT consult with provider, Float heels, Keep linens dry and wrinkle-free, Minimize linen layers    Moisture Interventions: Absorbent underpads, Limit adult briefs, Minimize layers    Activity Interventions: Pressure redistribution bed/mattress(bed type), PT/OT evaluation    Mobility Interventions: HOB 30 degrees or less, Pressure redistribution bed/mattress (bed type), PT/OT evaluation    Nutrition Interventions: Document food/fluid/supplement intake    Friction and Shear Interventions: HOB 30 degrees or less, Minimize layers             4/21/2023 1031 by Bishop Rogers  Outcome: Progressing Towards Goal  Note: Pressure Injury Interventions:  Sensory Interventions: Assess changes in LOC, Discuss PT/OT consult with provider, Float heels, Keep linens dry and wrinkle-free, Minimize linen layers    Moisture Interventions: Absorbent underpads, Limit adult briefs, Minimize layers    Activity Interventions: Pressure redistribution bed/mattress(bed type), PT/OT evaluation    Mobility Interventions: HOB 30 degrees or less, Pressure redistribution bed/mattress (bed type), PT/OT evaluation    Nutrition Interventions: Document food/fluid/supplement intake    Friction and Shear Interventions: HOB 30 degrees or less, Minimize layers                Problem: Patient Education: Go to Patient Education Activity  Goal: Patient/Family Education  4/21/2023 1457 by Paulette Bucoda  Outcome: Progressing Towards Goal  4/21/2023 1031 by Paulette Bucoda  Outcome: Progressing Towards Goal     Problem: Delirium Treatment  Goal: *Level of consciousness restored to baseline  4/21/2023 1457 by Paulette Bucoda  Outcome: Progressing Towards Goal  4/21/2023 1031 by Paulette Bucoda  Outcome: Progressing Towards Goal  Goal: *Level of environmental perceptions restored to baseline  4/21/2023 1457 by Paulette Bucoda  Outcome: Progressing Towards Goal  4/21/2023 1031 by Paulette Bucoda  Outcome: Progressing Towards Goal  Goal: *Sensory perception restored to baseline  4/21/2023 1457 by Paulette Bucoda  Outcome: Progressing Towards Goal  4/21/2023 1031 by Paulette Bucoda  Outcome: Progressing Towards Goal  Goal: *Emotional stability restored to baseline  4/21/2023 1457 by Paulette Bucoda  Outcome: Progressing Towards Goal  4/21/2023 1031 by Paulette Bucoda  Outcome: Progressing Towards Goal  Goal: *Functional assessment restored to baseline  4/21/2023 1457 by Paulette Bucoda  Outcome: Progressing Towards Goal  4/21/2023 1031 by Pauletet Bucoda  Outcome: Progressing Towards Goal  Goal: *Absence of falls  4/21/2023 1457 by Paulette Bucoda  Outcome: Progressing Towards Goal  4/21/2023 1031 by Paulette Bucoda  Outcome: Progressing Towards Goal  Goal: *Will remain free of delirium, CAM Score negative  4/21/2023 1457 by Paulette Bucoda  Outcome: Progressing Towards Goal  4/21/2023 1031 by Paulette Bucoda  Outcome: Progressing Towards Goal  Goal: *Cognitive status will be restored to baseline  4/21/2023 1457 by Paulette Bucoda  Outcome: Progressing Towards Goal  4/21/2023 1031 by Maximiliano Sawyer  Outcome: Progressing Towards Goal  Goal: Interventions  4/21/2023 1457 by Maximiliano Sawyer  Outcome: Progressing Towards Goal  4/21/2023 1031 by Maximiliano Sawyer  Outcome: Progressing Towards Goal     Problem: Patient Education: Go to Patient Education Activity  Goal: Patient/Family Education  4/21/2023 1457 by Maximiliano Sawyer  Outcome: Progressing Towards Goal  4/21/2023 1031 by Maximiliano Sawyer  Outcome: Progressing Towards Goal     Problem: Patient Education: Go to Patient Education Activity  Goal: Patient/Family Education  4/21/2023 1457 by Maximiliano Sawyer  Outcome: Progressing Towards Goal  4/21/2023 1031 by Maximiliano Sawyer  Outcome: Progressing Towards Goal     Problem: Patient Education: Go to Patient Education Activity  Goal: Patient/Family Education  4/21/2023 1457 by Maximiliano Sawyer  Outcome: Progressing Towards Goal  4/21/2023 1031 by Maximiliano Sawyer  Outcome: Progressing Towards Goal

## 2023-04-21 NOTE — DISCHARGE INSTRUCTIONS
Discharge SNF/Rehab Instructions/LTAC       PATIENT ID: Hang Avila  MRN: [id number]   YOB: 1950    DATE OF ADMISSION: 4/17/2023  DATE OF DISCHARGE: 4/21/2023    PRIMARY CARE PROVIDER: @PCP@       ATTENDING PHYSICIAN: Dr. Mannie Turner  DISCHARGING PROVIDER: Joel Giron NP     To contact this individual call [phone] and ask the  to page. If unavailable ask to be transferred the Adult Hospitalist Department. CONSULTATIONS: cardiologist and neurologist    PROCEDURES/SURGERIES: * No surgery found *    ADMITTING [address] [place] [address] COURSE:   4/17: Hang Avila is a 68 y.o. female with history of Parkinson's, Lewy body dementia, major depressive disorder, GERD, A-fib on Eliquis who presents to ED accompanied by family for concerns of altered mental status. Patient is seen and examined at bedside. Patient is alert and oriented x4 and appropriately conversant. She states that she feels well and has no acute complaints or concerns. Family at bedside specifically daughter and son-in-law state over the last 2 days, patient has had increasing levels of confusion. She is appropriate at the moment but has not been so consistently for the last 2 days. Family states she has known history of recurrent UTIs and with concern for UTI. She was seen by her urologist today and noted to have mildly low blood pressures and referred to ED. She denies any urinary symptoms at this time. 4/21:  Pt was admitted to the hospital with AMS with concerns of possible UTI &/or URI. Hx of frequent UTI (multiple course of abx therapy), according to the daughter, pt develop mental status change in presence of UTI in the past with negative U/A. Last urine cx at [facility]rology group on 3/24/2023 grew enterococcus faecalis. U/A on admission was negative, urine cx was not processed. Pt was treated with empiric abx therapy for 5 days during admission. CXR was negative. Pt has on and off congested cough.  All symptoms resolved after completing empiric abx therapy. Pt was evaluated by neurologist, organic encephalopathy work up was negative. Pt had one episode of 23 beats of V-tach. Pt was evaluated by cardiologist. All work up was negative include TTE. Pt's mental status returned to base line per patient. Pt is alert and orient x 4. C/o weakness, generalized deconditioned body. Pt will be transition to SNF for further rehab. Pt is medically clear to discharge. DISCHARGE DIAGNOSES / PLAN:      AMS - at baseline  Hx of frequent UTI; hx of negative U/A with + urine cx  - afebrile, wbc normal    U/A on admission (4/17) was negative, urine cx was not processed     Hx of mental status change with UTI in presence of negative U/A, but + urine cx. Last urine cx grew Enterococcus faecalis which was treated with macrobid. She finished abx less than 2 weeks ago prior to mental status change    Blood cx (4/18) no growth so far      At this time, we recommend to complete total 5 day course of abx therapy with po Augmentin. No other active infectious process at this time. CXR (4/17) no acute pulmonary process    MRI of brain (4/18) Evaluation is significantly limited by motion. No acute intracranial  abnormality. Mild chronic microvascular ischemic disease      Has apparently had waxing and waning mentation at home      Neurology following;     Hypertension  Hx of A-fib  Episode of V-tach on 4/20  -  TTE (4/20) Normal left ventricular systolic function with a visually estimated EF of 55 - 60%. Left ventricle size is normal. Normal wall thickness. Normal wall motion. Normal diastolic function. Mitral Valve: Mild to moderate regurgitation. Left Atrium: Left atrium is severely dilated.      Continue Coreg & eliquis     evaluated by cardiologist on 4/19 and signed off on 4/20    Episode of hypotension   - resolved after hydration    Lewy body dementia/Parkinson's/major depressive disorder  - Continue home Sinemet, Aricept, Seroquel, fludrocortisone    Appreciate pharmacy assistance with medications, back on home regimen          PENDING TEST RESULTS:   At the time of discharge the following test results are still pending: none    FOLLOW UP APPOINTMENTS:    Cardiologist at [address] [place] [address]: none    DIET: Regular Diet    TUBE FEEDING INSTRUCTIONS: none    OXYGEN / BiPAP SETTINGS: none    ACTIVITY: Activity as tolerated    WOUND CARE: none    EQUIPMENT needed: none      DISCHARGE MEDICATIONS:  Current Discharge Medication List              CONTINUE these medications which have NOT CHANGED     Details   acetaminophen (TYLENOL) 500 mg tablet Take 2 Tablets by mouth every twelve (12) hours. 0900, 1700  Indications: pain       escitalopram oxalate (LEXAPRO) 10 mg tablet Take 1 Tablet by mouth daily. 0900       estradioL (ESTRACE) 0.01 % (0.1 mg/gram) vaginal cream Insert 2 g into vagina daily. AZO CRANBERRY 729-31-17 mg-mg-million tab Take 1,000 mg by mouth daily. 0900       melatonin 10 mg chew Take 20 mg by mouth nightly. 2100       Eliquis 5 mg tablet 1 Tablet two (2) times a day. 0900, 1700       carbidopa-levodopa ER (SINEMET CR)  mg per tablet 1 Tablet three (3) times daily. 0900, 1200, 1700       carvediloL (COREG) 3.125 mg tablet Take 1 Tablet by mouth two (2) times daily (with meals). 0900, 1700       donepeziL (ARICEPT) 10 mg tablet Take 1 Tablet by mouth daily. 0900       fludrocortisone (FLORINEF) 0.1 mg tablet Take 0.5 Tablets by mouth two (2) times a day. 0900, 1200       omeprazole (PRILOSEC) 20 mg capsule 1 Capsule every other day. 0900       potassium chloride (K-DUR, KLOR-CON M20) 20 mEq tablet 1 Tablet daily. 0900       QUEtiapine (SEROquel) 25 mg tablet Take 2 Tablets by mouth nightly. 2100       trospium (SANCTURA XL) 60 mg capsule 1 Capsule every evening. 1700       Nuplazid 34 mg cap Take 34 mg by mouth every evening.  1700  Indications: psychosis associated with Parkinson's disease       Gemtesa 75 mg tablet 1 Tablet daily. 0900  Indications: a condition where the urge to urinate results in urine leakage, overactive bladder         NOTIFY YOUR PHYSICIAN FOR ANY OF THE FOLLOWING:   Fever over 101 degrees for 24 hours. Chest pain, shortness of breath, fever, chills, nausea, vomiting, diarrhea, change in mentation, falling, weakness, bleeding. Severe pain or pain not relieved by medications. Or, any other signs or symptoms that you may have questions about. DISPOSITION:    Home With:   OT  PT  HH  RN      x SNF/Inpatient Rehab/LTAC    Independent/assisted living    Hospice    Other:       PATIENT CONDITION AT DISCHARGE:     Functional status    Poor    x Deconditioned     Independent      Cognition     Lucid     Forgetful    x Dementia      Catheters/lines (plus indication)    Hillman     PICC     PEG    x None      Code status    x Full code     DNR      PHYSICAL EXAMINATION AT DISCHARGE:  Constitutional:  No acute distress, cooperative, pleasant    ENT:  Oral mucosa moist, oropharynx benign. Resp:  CTA bilaterally. No wheezing/rhonchi/rales. No accessory muscle use. CV:  Regular, no murmurs, gallops, rubs    GI:  Soft, non distended, non tender. normoactive bowel sounds     Musculoskeletal:  No edema, warm, 2+ pulses throughout    Neurologic:  Moves all extremities.   AAOx3, follows commands, tremulous, limited medical hisotrian         CHRONIC MEDICAL DIAGNOSES:  Problem List as of 4/21/2023 Date Reviewed: 4/21/2023            Codes Class Noted - Resolved    Hx: UTI (urinary tract infection) ICD-10-CM: Z87.440  ICD-9-CM: V13.02  4/21/2023 - Present        Hypertension ICD-10-CM: I10  ICD-9-CM: 401.9  4/21/2023 - Present        Paroxysmal atrial fibrillation (HCC) ICD-10-CM: I48.0  ICD-9-CM: 427.31  4/21/2023 - Present        Hx of ventricular tachycardia ICD-10-CM: Z86.79  ICD-9-CM: V12.59  4/21/2023 - Present        Lewy body dementia ([facility] ICD-10-CM: G31.83, F02.80  ICD-9-CM: 331.82  4/21/2023 - Present        Parkinson disease ([facility] ICD-10-CM: Kolton Anon  ICD-9-CM: 332.0  4/21/2023 - Present        Major depressive disorder ICD-10-CM: F32.9  ICD-9-CM: 296.20  4/21/2023 - Present        * (Principal) AMS (altered mental status) ICD-10-CM: R41.82  ICD-9-CM: 780.97  4/17/2023 - Present             CDMP Checked:   Yes y     PROBLEM LIST Updated:  Yes y         Signed:   Soraya Andres NP  4/21/2023  12:21 PM

## 2023-04-21 NOTE — DISCHARGE SUMMARY
Physician Discharge Summary     Patient ID:    Chintan Henderson  [id number]    1950    Admit date: 4/17/2023    Discharge date and time: 4/21/2023    ATTENDING PHYSICIAN: Dr. Josephine Alexander  DISCHARGING PROVIDER: Juani Smith NP      DISCHARGE CONDITION: Stable    HPI  4/17: Chintan Henderson is a 68 y.o. female with history of Parkinson's, Lewy body dementia, major depressive disorder, GERD, A-fib on Eliquis who presents to ED accompanied by family for concerns of altered mental status. Patient is seen and examined at bedside. Patient is alert and oriented x4 and appropriately conversant. She states that she feels well and has no acute complaints or concerns. Family at bedside specifically daughter and son-in-law state over the last 2 days, patient has had increasing levels of confusion. She is appropriate at the moment but has not been so consistently for the last 2 days. Family states she has known history of recurrent UTIs and with concern for UTI. She was seen by her urologist today and noted to have mildly low blood pressures and referred to ED. She denies any urinary symptoms at this time. Discharge summary  4/21:  Pt was admitted to the hospital with AMS with concerns of possible UTI &/or URI. Hx of frequent UTI (multiple course of abx therapy), according to the daughter, pt develop mental status change in presence of UTI in the past with negative U/A. Last urine cx at [place] [facility]rology group on 3/24/2023 grew enterococcus faecalis. U/A on admission was negative, urine cx was not processed. Pt was treated with empiric abx therapy for 5 days during admission. CXR was negative. Pt has on and off congested cough. All symptoms resolved after completing empiric abx therapy. Pt was evaluated by neurologist, organic encephalopathy work up was negative. Pt had one episode of 23 beats of V-tach. Pt was evaluated by cardiologist. All work up was negative include TTE.  Pt's mental status returned to base line per patient. Pt is alert and orient x 4. C/o weakness, generalized deconditioned body. Pt will be transition to SNF for further rehab. Pt is medically clear to discharge. Hospital Diagnoses and Treatment Rendered:     AMS - at baseline  Hx of frequent UTI; hx of negative U/A with + urine cx  - afebrile, wbc normal    U/A on admission (4/17) was negative, urine cx was not processed     Hx of mental status change with UTI in presence of negative U/A, but + urine cx. Last urine cx grew Enterococcus faecalis which was treated with macrobid. She finished abx less than 2 weeks ago prior to mental status change    Blood cx (4/18) no growth so far      At this time, we recommend to complete total 5 day course of abx therapy with po Augmentin. No other active infectious process at this time. CXR (4/17) no acute pulmonary process    MRI of brain (4/18) Evaluation is significantly limited by motion. No acute intracranial  abnormality. Mild chronic microvascular ischemic disease      Has apparently had waxing and waning mentation at home      Neurology following;     Hypertension  Hx of A-fib  Episode of V-tach on 4/20  -  TTE (4/20) Normal left ventricular systolic function with a visually estimated EF of 55 - 60%. Left ventricle size is normal. Normal wall thickness. Normal wall motion. Normal diastolic function. Mitral Valve: Mild to moderate regurgitation. Left Atrium: Left atrium is severely dilated. Continue Coreg & eliquis     evaluated by cardiologist on 4/19 and signed off on 4/20    Episode of hypotension   - resolved after hydration    Lewy body dementia/Parkinson's/major depressive disorder  - Continue home Sinemet, Aricept, Seroquel, fludrocortisone    Discharge Exam:  T 97.8 /75 P 59 R18    Constitutional:  No acute distress, cooperative, pleasant    ENT:  Oral mucosa moist, oropharynx benign. Resp:  CTA bilaterally. No wheezing/rhonchi/rales. No accessory muscle use. CV:  Regular, no murmurs, gallops, rubs    GI:  Soft, non distended, non tender. normoactive bowel sounds     Musculoskeletal:  No edema, warm, 2+ pulses throughout    Neurologic:  Moves all extremities. AAOx3, follows commands, tremulous, limited medical hisotrian       Chronic Diagnoses:    Problem List as of 4/21/2023 Date Reviewed: 4/21/2023            Codes Class Noted - Resolved    Hx: UTI (urinary tract infection) ICD-10-CM: Z87.440  ICD-9-CM: V13.02  4/21/2023 - Present        Hypertension ICD-10-CM: I10  ICD-9-CM: 401.9  4/21/2023 - Present        Paroxysmal atrial fibrillation ([facility] ICD-10-CM: I48.0  ICD-9-CM: 427.31  4/21/2023 - Present        Hx of ventricular tachycardia ICD-10-CM: Z86.79  ICD-9-CM: V12.59  4/21/2023 - Present        Lewy body dementia ([facility] ICD-10-CM: G31.83, F02.80  ICD-9-CM: 331.82  4/21/2023 - Present        Parkinson disease ([facility] ICD-10-CM: Maureen Burger  ICD-9-CM: 332.0  4/21/2023 - Present        Major depressive disorder ICD-10-CM: F32.9  ICD-9-CM: 296.20  4/21/2023 - Present        * (Principal) AMS (altered mental status) ICD-10-CM: R41.82  ICD-9-CM: 780.97  4/17/2023 - Present           Discharge Medications:     Current Discharge Medication List        CONTINUE these medications which have NOT CHANGED    Details   acetaminophen (TYLENOL) 500 mg tablet Take 2 Tablets by mouth every twelve (12) hours. 0900, 1700  Indications: pain      escitalopram oxalate (LEXAPRO) 10 mg tablet Take 1 Tablet by mouth daily. 0900      estradioL (ESTRACE) 0.01 % (0.1 mg/gram) vaginal cream Insert 2 g into vagina daily. AZO CRANBERRY 136-94-00 mg-mg-million tab Take 1,000 mg by mouth daily. 0900      melatonin 10 mg chew Take 20 mg by mouth nightly. 2100      Eliquis 5 mg tablet 1 Tablet two (2) times a day. 0900, 1700      carbidopa-levodopa ER (SINEMET CR)  mg per tablet 1 Tablet three (3) times daily.  0900, 1200, 1700      carvediloL (COREG) 3.125 mg tablet Take 1 Tablet by mouth two (2) times daily (with meals). 0900, 1700      donepeziL (ARICEPT) 10 mg tablet Take 1 Tablet by mouth daily. 0900      fludrocortisone (FLORINEF) 0.1 mg tablet Take 0.5 Tablets by mouth two (2) times a day. 0900, 1200      omeprazole (PRILOSEC) 20 mg capsule 1 Capsule every other day. 0900      potassium chloride (K-DUR, KLOR-CON M20) 20 mEq tablet 1 Tablet daily. 0900      QUEtiapine (SEROquel) 25 mg tablet Take 2 Tablets by mouth nightly. 2100      trospium (SANCTURA XL) 60 mg capsule 1 Capsule every evening. 1700      Nuplazid 34 mg cap Take 34 mg by mouth every evening. 1700  Indications: psychosis associated with Parkinson's disease      Gemtesa 75 mg tablet 1 Tablet daily. 0900  Indications: a condition where the urge to urinate results in urine leakage, overactive bladder               Follow up Care:    1. Katie Fry NP in 1-2 weeks. Please call to set up an appointment shortly after discharge. 2. Follow up [facility], cardiology NP at [facility]    Diet:  Regular Diet    Disposition:  SNF.        CONSULTATIONS: Cardiology    Significant Diagnostic Studies:   4/17/2023: BUN 25 MG/DL (H; Ref range: 6 - 20 MG/DL); Calcium 9.1 MG/DL (Ref range: 8.5 - 10.1 MG/DL); CO2 29 mmol/L (Ref range: 21 - 32 mmol/L); Creatinine 0.97 MG/DL (Ref range: 0.55 - 1.02 MG/DL); Glucose 101 mg/dL (H; Ref range: 65 - 100 mg/dL); HCT 41.5 % (Ref range: 35.0 - 47.0 %); HGB 13.6 g/dL (Ref range: 11.5 - 16.0 g/dL); Potassium 3.8 mmol/L (Ref range: 3.5 - 5.1 mmol/L); Sodium 141 mmol/L (Ref range: 136 - 145 mmol/L)  No results for input(s): WBC, HGB, HCT, PLT, HGBEXT, HCTEXT, PLTEXT in the last 72 hours. Recent Labs     04/19/23  1640      K 3.0*   *   CO2 29   BUN 15   CREA 0.78   *   CA 8.3*   MG 1.7     No results for input(s): AP, TBIL, TP, ALB, GLOB, GGT, AML, LPSE in the last 72 hours.     No lab exists for component: SGOT, GPT, AMYP, HLPSE  No results for input(s): INR, PTP, APTT, INREXT in the last 72 hours. No results for input(s): FE, TIBC, PSAT, FERR in the last 72 hours. No results for input(s): PH, PCO2, PO2 in the last 72 hours. No results for input(s): CPK, CKMB in the last 72 hours.     No lab exists for component: TROPONINI  No components found for: [place]      Greater than 35 minutes were spent with the patient on counseling and coordination of care      Signed:  Soraya Saleh NP  4/21/2023  12:22 PM

## 2023-04-21 NOTE — PROGRESS NOTES
TRANSFER - OUT REPORT:    Verbal report given to Charmaine RN(name) on Corky Ochoa  being transferred to [facility]. Report consisted of patients Situation, Background, Assessment and   Recommendations(SBAR). Information from the following report(s) SBAR, Kardex, Intake/Output, MAR, Recent Results, and Med Rec Status was reviewed with the receiving nurse. Opportunity for questions and clarification was provided. Patient transported with:  Pt belongings. [facility]  time is 1600. I have reviewed discharge instructions with the patient and daughter. The patient and daughter verbalized understanding.

## 2023-04-21 NOTE — PROGRESS NOTES
Transition of Care Plan to SNF/Rehab    SNF/Rehab Transition:  Patient has been accepted to [facility] and meets criteria for admission. Patient will transported by [facility] and expected to leave at 1600. Communication to Patient/Family:  Met with patient and Lianne Jones (identified care giver) and they are agreeable to the transition plan. Communication to SNF/Rehab:  Bedside RN, Allyssa Sykes, has been notified to update the transition plan to the facility and call report (phone number [phone]; [facility] Room #106A). Discharge information has been updated on the AVS.     Discharge instructions to be fax'd to facility at [facility] # [id number]). Nursing Please include all hard scripts for controlled substances, med rec and dc summary, and AVS in packet. Reviewed and confirmed with facility, [address] [place] [address],[address], can manage the patient care needs for the following:     Jose Moreno with (X) only those applicable:    Medication:  [x]  Medications will be available at the facility  []  IV Antibiotics   []  Controlled Substance - hard copy to be sent with patient   [x]  Weekly Labs   Documents:  [x] Hard RX  [x] MAR  [x] Kardex  [x] AVS  [x]Transfer Summary  [x]Discharge   Equipment:  []  CPAP/BiPAP  []  Wound Vacuum  []  Hillman or Urinary Device  []  PICC/Central Line  []  Nebulizer  []  Ventilator   Treatment:  []Isolation (for MRSA, VRE, etc.)  []Surgical Drain Management  []Tracheostomy Care  []Dressing Changes  []Dialysis with transportation and chair time   []PEG Care  []Oxygen  []Daily Weights for Heart Failure   Dietary:  []Any diet limitations  []Tube Feedings   []Total Parenteral Management (TPN)   Eligible for Medicaid Long Term Services and Supports  Yes:  [] Eligible for medical assistance or will become eligible within 180 days and UAI completed. [] Provider/Patient and/or support system has requested screening.   [] UAI copy provided to patient or responsible party,  [] UAI unavailable at discharge will send once processed to SNF provider. [] UAI unavailable at discharged mailed to patient  No:   [x] Private pay and is not financially eligible for Medicaid within the next 180 days. [] Reside out-of-state. [] A residents of a state owned/operated facility that is licensed  by [address] [employer] [facility] or [facility]  [] Enrollment in [facility] hospice services  []  [facility]  [] Patient /Family declines to have screening completed or provide financial information for screening     Financial Resources:  Medicaid    [] Initiated and application pending   [] Full coverage     Advanced Care Plan:  []Surrogate Decision Maker of Care  []POA  [x]Communicated Code Status DNR   Other     Ashlee No Marlena Salines) Cheryle Locker, M.S.W.

## 2023-04-21 NOTE — PROGRESS NOTES
Problem: Falls - Risk of  Goal: *Absence of Falls  Description: Document Gisela Zamarripa Fall Risk and appropriate interventions in the flowsheet. Outcome: Progressing Towards Goal  Note: Fall Risk Interventions:                                Problem: Patient Education: Go to Patient Education Activity  Goal: Patient/Family Education  Outcome: Progressing Towards Goal     Problem: Pressure Injury - Risk of  Goal: *Prevention of pressure injury  Description: Document Sanjeev Scale and appropriate interventions in the flowsheet.   Outcome: Progressing Towards Goal  Note: Pressure Injury Interventions:  Sensory Interventions: Assess changes in LOC, Discuss PT/OT consult with provider, Float heels, Keep linens dry and wrinkle-free, Minimize linen layers    Moisture Interventions: Absorbent underpads, Limit adult briefs, Minimize layers    Activity Interventions: Pressure redistribution bed/mattress(bed type), PT/OT evaluation    Mobility Interventions: HOB 30 degrees or less, Pressure redistribution bed/mattress (bed type), PT/OT evaluation    Nutrition Interventions: Document food/fluid/supplement intake    Friction and Shear Interventions: HOB 30 degrees or less, Minimize layers                Problem: Patient Education: Go to Patient Education Activity  Goal: Patient/Family Education  Outcome: Progressing Towards Goal     Problem: Delirium Treatment  Goal: *Level of consciousness restored to baseline  Outcome: Progressing Towards Goal  Goal: *Level of environmental perceptions restored to baseline  Outcome: Progressing Towards Goal  Goal: *Sensory perception restored to baseline  Outcome: Progressing Towards Goal  Goal: *Emotional stability restored to baseline  Outcome: Progressing Towards Goal  Goal: *Functional assessment restored to baseline  Outcome: Progressing Towards Goal  Goal: *Absence of falls  Outcome: Progressing Towards Goal  Goal: *Will remain free of delirium, CAM Score negative  Outcome: Progressing Towards Goal  Goal: *Cognitive status will be restored to baseline  Outcome: Progressing Towards Goal  Goal: Interventions  Outcome: Progressing Towards Goal     Problem: Patient Education: Go to Patient Education Activity  Goal: Patient/Family Education  Outcome: Progressing Towards Goal     Problem: Patient Education: Go to Patient Education Activity  Goal: Patient/Family Education  Outcome: Progressing Towards Goal     Problem: Patient Education: Go to Patient Education Activity  Goal: Patient/Family Education  Outcome: Progressing Towards Goal

## 2023-04-21 NOTE — PROGRESS NOTES
Problem: Falls - Risk of  Goal: *Absence of Falls  Description: Document Boone Rivero Fall Risk and appropriate interventions in the flowsheet. Outcome: Progressing Towards Goal  Note: Fall Risk Interventions:                                Problem: Patient Education: Go to Patient Education Activity  Goal: Patient/Family Education  Outcome: Progressing Towards Goal     Problem: Pressure Injury - Risk of  Goal: *Prevention of pressure injury  Description: Document Sanjeev Scale and appropriate interventions in the flowsheet.   Outcome: Progressing Towards Goal  Note: Pressure Injury Interventions:  Sensory Interventions: Assess changes in LOC    Moisture Interventions: Absorbent underpads    Activity Interventions: Assess need for specialty bed    Mobility Interventions: Assess need for specialty bed    Nutrition Interventions: Document food/fluid/supplement intake    Friction and Shear Interventions: Apply protective barrier, creams and emollients                Problem: Patient Education: Go to Patient Education Activity  Goal: Patient/Family Education  Outcome: Progressing Towards Goal     Problem: Delirium Treatment  Goal: *Level of consciousness restored to baseline  Outcome: Progressing Towards Goal  Goal: *Level of environmental perceptions restored to baseline  Outcome: Progressing Towards Goal  Goal: *Sensory perception restored to baseline  Outcome: Progressing Towards Goal  Goal: *Emotional stability restored to baseline  Outcome: Progressing Towards Goal  Goal: *Functional assessment restored to baseline  Outcome: Progressing Towards Goal  Goal: *Absence of falls  Outcome: Progressing Towards Goal  Goal: *Will remain free of delirium, CAM Score negative  Outcome: Progressing Towards Goal  Goal: *Cognitive status will be restored to baseline  Outcome: Progressing Towards Goal  Goal: Interventions  Outcome: Progressing Towards Goal     Problem: Patient Education: Go to Patient Education Activity  Goal: Patient/Family Education  Outcome: Progressing Towards Goal     Problem: Patient Education: Go to Patient Education Activity  Goal: Patient/Family Education  Outcome: Progressing Towards Goal     Problem: Patient Education: Go to Patient Education Activity  Goal: Patient/Family Education  Outcome: Progressing Towards Goal

## 2023-04-21 NOTE — PROGRESS NOTES
5:36 PM  CM consulted to assist with patient transport. Informed [facility] pushed transport back to 12:00 am.      [facility] [phone]. [facility] able to accept and accommodate patient for transport at 6:15 pm.  RN notified of updates. Transport cancelled with [facility] [id number].     MIRTA Langford/CRM  Care Management

## 2023-04-22 LAB
ATRIAL RATE: 59 BPM
CALCULATED P AXIS, ECG09: 16 DEGREES
CALCULATED R AXIS, ECG10: 16 DEGREES
CALCULATED T AXIS, ECG11: 26 DEGREES
DIAGNOSIS, 93000: NORMAL
P-R INTERVAL, ECG05: 142 MS
Q-T INTERVAL, ECG07: 486 MS
QRS DURATION, ECG06: 94 MS
QTC CALCULATION (BEZET), ECG08: 481 MS
VENTRICULAR RATE, ECG03: 59 BPM

## 2023-04-23 LAB
BACTERIA SPEC CULT: NORMAL
SERVICE CMNT-IMP: NORMAL

## 2023-04-25 NOTE — PROGRESS NOTES
Physician Progress Note      Joce Nugent  CSN #:                  [id number]  :                       1950  ADMIT DATE:       2023 3:04 PM  [address] [place] DATE:        2023 7:00 PM  RESPONDING  PROVIDER #:        Martha Moreno MD          QUERY TEXT:    Pt admitted with AMS. Pt noted to have metabolic encephalopathy. If possible, please document in progress notes and discharge summary the relationship, if any, between metabolic encephalopathy and UTI or dementia. The medical record reflects the following:  Risk Factors: dementia  UTI  metabolic encephalopathy    Clinical Indicators:  H&P  AMS  -none witnessed. Appropriate and conversant in ED  -Has apparently had waxing and waning mentation at home  Cannot rule out progression of dementia discussed with her mental status changes    D/C summary  Lewy body dementia  Hx of frequent UTI; hx of negative U/A with + urine cx  U/A on admission () was negative, urine cx was not processed  Hx of mental status change with UTI in presence of negative U/A, but + urine cx. Last urine cx grew Enterococcus faecalis which was treated with macrobid. She finished abx less than 2 weeks ago prior to mental status change    MRI of Brain   Evaluation is significantly limited by motion. No acute intracranial  abnormality.    Mild chronic microvascular ischemic disease    Treatment:  neuro consult  donepeziL (ARICEPT) tablet 10 mg  cefepime (MAXIPIME) 2 g in 0.9% sodium chloride 10 mL IV syringe  amoxicillin-clavulanate (AUGMENTIN) 875-125 mg per tablet 1 Tablet  piperacillin-tazobactam (ZOSYN) 3.375 g in 0.9% sodium chloride (MBP/ADV) 100 mL MBP    Thank you,  Amanda Hoover RN [facility]  Clinical Documentation  [phone] or via [facility] [address] provided:  -- metabolic encephalopathy due to dementia  -- metabolic encephalopathy due to UTI  -- Other - I will add my own diagnosis  -- Disagree - Not applicable / Not valid  -- Disagree - Clinically unable to determine / Unknown  -- Refer to Clinical Documentation Reviewer    PROVIDER RESPONSE TEXT:    This patient has metabolic encephalopathy due to UTI.     Query created by: Romana Maclachlan on 4/25/2023 8:51 AM      Electronically signed by:  Isabela Champion MD 4/25/2023 1:04 PM

## 2024-09-25 ENCOUNTER — APPOINTMENT (OUTPATIENT)
Facility: HOSPITAL | Age: 74
End: 2024-09-25
Payer: MEDICARE

## 2024-09-25 ENCOUNTER — HOSPITAL ENCOUNTER (INPATIENT)
Facility: HOSPITAL | Age: 74
LOS: 2 days | Discharge: HOME | End: 2024-09-27
Attending: EMERGENCY MEDICINE | Admitting: FAMILY MEDICINE
Payer: MEDICARE

## 2024-09-25 DIAGNOSIS — I48.91 ATRIAL FIBRILLATION WITH RAPID VENTRICULAR RESPONSE (HCC): ICD-10-CM

## 2024-09-25 DIAGNOSIS — R53.1 LEFT-SIDED WEAKNESS: ICD-10-CM

## 2024-09-25 DIAGNOSIS — R47.1 DYSARTHRIA: Primary | ICD-10-CM

## 2024-09-25 PROBLEM — R29.818 FOCAL NEUROLOGICAL DEFICIT: Status: ACTIVE | Noted: 2024-09-25

## 2024-09-25 LAB
ALBUMIN SERPL-MCNC: 3.4 G/DL (ref 3.5–5)
ALBUMIN/GLOB SERPL: 1 (ref 1.1–2.2)
ALP SERPL-CCNC: 62 U/L (ref 45–117)
ALT SERPL-CCNC: <6 U/L (ref 12–78)
ANION GAP SERPL CALC-SCNC: 5 MMOL/L (ref 2–12)
AST SERPL-CCNC: 20 U/L (ref 15–37)
BASOPHILS # BLD: 0 K/UL (ref 0–0.1)
BASOPHILS NFR BLD: 1 % (ref 0–1)
BILIRUB SERPL-MCNC: 1.8 MG/DL (ref 0.2–1)
BUN SERPL-MCNC: 20 MG/DL (ref 6–20)
BUN/CREAT SERPL: 22 (ref 12–20)
CALCIUM SERPL-MCNC: 8.9 MG/DL (ref 8.5–10.1)
CHLORIDE SERPL-SCNC: 103 MMOL/L (ref 97–108)
CO2 SERPL-SCNC: 30 MMOL/L (ref 21–32)
CREAT SERPL-MCNC: 0.92 MG/DL (ref 0.55–1.02)
DIFFERENTIAL METHOD BLD: ABNORMAL
EOSINOPHIL # BLD: 0.2 K/UL (ref 0–0.4)
EOSINOPHIL NFR BLD: 5 % (ref 0–7)
ERYTHROCYTE [DISTWIDTH] IN BLOOD BY AUTOMATED COUNT: 12.4 % (ref 11.5–14.5)
GLOBULIN SER CALC-MCNC: 3.3 G/DL (ref 2–4)
GLUCOSE BLD STRIP.AUTO-MCNC: 143 MG/DL (ref 65–117)
GLUCOSE BLD STRIP.AUTO-MCNC: 93 MG/DL (ref 65–117)
GLUCOSE SERPL-MCNC: 131 MG/DL (ref 65–100)
HCT VFR BLD AUTO: 41.9 % (ref 35–47)
HGB BLD-MCNC: 13.9 G/DL (ref 11.5–16)
IMM GRANULOCYTES # BLD AUTO: 0 K/UL (ref 0–0.04)
IMM GRANULOCYTES NFR BLD AUTO: 0 % (ref 0–0.5)
INR PPP: 1.1 (ref 0.9–1.1)
LYMPHOCYTES # BLD: 0.8 K/UL (ref 0.8–3.5)
LYMPHOCYTES NFR BLD: 21 % (ref 12–49)
MCH RBC QN AUTO: 33.8 PG (ref 26–34)
MCHC RBC AUTO-ENTMCNC: 33.2 G/DL (ref 30–36.5)
MCV RBC AUTO: 101.9 FL (ref 80–99)
MONOCYTES # BLD: 0.6 K/UL (ref 0–1)
MONOCYTES NFR BLD: 15 % (ref 5–13)
NEUTS SEG # BLD: 2.2 K/UL (ref 1.8–8)
NEUTS SEG NFR BLD: 59 % (ref 32–75)
NRBC # BLD: 0 K/UL (ref 0–0.01)
NRBC BLD-RTO: 0 PER 100 WBC
PLATELET # BLD AUTO: 128 K/UL (ref 150–400)
PMV BLD AUTO: 11.3 FL (ref 8.9–12.9)
POTASSIUM SERPL-SCNC: 3.3 MMOL/L (ref 3.5–5.1)
PROT SERPL-MCNC: 6.7 G/DL (ref 6.4–8.2)
PROTHROMBIN TIME: 11.9 SEC (ref 9–11.1)
RBC # BLD AUTO: 4.11 M/UL (ref 3.8–5.2)
SERVICE CMNT-IMP: ABNORMAL
SERVICE CMNT-IMP: NORMAL
SODIUM SERPL-SCNC: 138 MMOL/L (ref 136–145)
TROPONIN I SERPL HS-MCNC: 13 NG/L (ref 0–51)
WBC # BLD AUTO: 3.8 K/UL (ref 3.6–11)

## 2024-09-25 PROCEDURE — 70450 CT HEAD/BRAIN W/O DYE: CPT

## 2024-09-25 PROCEDURE — 85025 COMPLETE CBC W/AUTO DIFF WBC: CPT

## 2024-09-25 PROCEDURE — 80053 COMPREHEN METABOLIC PANEL: CPT

## 2024-09-25 PROCEDURE — APPNB45 APP NON BILLABLE 31-45 MINUTES

## 2024-09-25 PROCEDURE — 4A0F3BE MEASUREMENT OF MUSCULOSKELETAL PRESSURE, COMPARTMENT, PERCUTANEOUS APPROACH: ICD-10-PCS | Performed by: HOSPITALIST

## 2024-09-25 PROCEDURE — 6360000004 HC RX CONTRAST MEDICATION: Performed by: GENERAL PRACTICE

## 2024-09-25 PROCEDURE — 85610 PROTHROMBIN TIME: CPT

## 2024-09-25 PROCEDURE — 82962 GLUCOSE BLOOD TEST: CPT

## 2024-09-25 PROCEDURE — 36415 COLL VENOUS BLD VENIPUNCTURE: CPT

## 2024-09-25 PROCEDURE — 99285 EMERGENCY DEPT VISIT HI MDM: CPT

## 2024-09-25 PROCEDURE — 70498 CT ANGIOGRAPHY NECK: CPT

## 2024-09-25 PROCEDURE — 93005 ELECTROCARDIOGRAM TRACING: CPT | Performed by: EMERGENCY MEDICINE

## 2024-09-25 PROCEDURE — 84484 ASSAY OF TROPONIN QUANT: CPT

## 2024-09-25 PROCEDURE — 2060000000 HC ICU INTERMEDIATE R&B

## 2024-09-25 PROCEDURE — 0042T CT BRAIN PERFUSION: CPT

## 2024-09-25 RX ORDER — IOPAMIDOL 755 MG/ML
100 INJECTION, SOLUTION INTRAVASCULAR
Status: COMPLETED | OUTPATIENT
Start: 2024-09-25 | End: 2024-09-25

## 2024-09-25 RX ORDER — ESTRADIOL 0.1 MG/G
2 CREAM VAGINAL DAILY
COMMUNITY

## 2024-09-25 RX ORDER — PHENOL 1.4 %
1 AEROSOL, SPRAY (ML) MUCOUS MEMBRANE DAILY
COMMUNITY

## 2024-09-25 RX ORDER — LANOLIN ALCOHOL/MO/W.PET/CERES
5 CREAM (GRAM) TOPICAL DAILY
COMMUNITY

## 2024-09-25 RX ORDER — POLYETHYLENE GLYCOL 3350 17 G/17G
17 POWDER, FOR SOLUTION ORAL DAILY PRN
Status: DISCONTINUED | OUTPATIENT
Start: 2024-09-25 | End: 2024-09-27 | Stop reason: HOSPADM

## 2024-09-25 RX ORDER — SODIUM CHLORIDE 0.9 % (FLUSH) 0.9 %
5-40 SYRINGE (ML) INJECTION PRN
Status: DISCONTINUED | OUTPATIENT
Start: 2024-09-25 | End: 2024-09-27 | Stop reason: HOSPADM

## 2024-09-25 RX ORDER — ACETAMINOPHEN 500 MG
500 TABLET ORAL EVERY 6 HOURS PRN
COMMUNITY

## 2024-09-25 RX ORDER — CARBIDOPA AND LEVODOPA 25; 100 MG/1; MG/1
1 TABLET ORAL 3 TIMES DAILY
COMMUNITY
End: 2024-09-26

## 2024-09-25 RX ORDER — ONDANSETRON 2 MG/ML
4 INJECTION INTRAMUSCULAR; INTRAVENOUS EVERY 6 HOURS PRN
Status: DISCONTINUED | OUTPATIENT
Start: 2024-09-25 | End: 2024-09-27 | Stop reason: HOSPADM

## 2024-09-25 RX ORDER — ONDANSETRON 4 MG/1
4 TABLET, ORALLY DISINTEGRATING ORAL EVERY 8 HOURS PRN
Status: DISCONTINUED | OUTPATIENT
Start: 2024-09-25 | End: 2024-09-27 | Stop reason: HOSPADM

## 2024-09-25 RX ORDER — ALBUTEROL SULFATE 90 UG/1
2 INHALANT RESPIRATORY (INHALATION) EVERY 6 HOURS PRN
COMMUNITY

## 2024-09-25 RX ORDER — TROSPIUM CHLORIDE ER 60 MG/1
60 CAPSULE ORAL DAILY
COMMUNITY

## 2024-09-25 RX ORDER — SODIUM CHLORIDE 0.9 % (FLUSH) 0.9 %
5-40 SYRINGE (ML) INJECTION EVERY 12 HOURS SCHEDULED
Status: DISCONTINUED | OUTPATIENT
Start: 2024-09-25 | End: 2024-09-27 | Stop reason: HOSPADM

## 2024-09-25 RX ORDER — ASPIRIN 81 MG/1
81 TABLET, CHEWABLE ORAL DAILY
Status: DISCONTINUED | OUTPATIENT
Start: 2024-09-26 | End: 2024-09-26

## 2024-09-25 RX ORDER — ATORVASTATIN CALCIUM 40 MG/1
80 TABLET, FILM COATED ORAL NIGHTLY
Status: DISCONTINUED | OUTPATIENT
Start: 2024-09-25 | End: 2024-09-26

## 2024-09-25 RX ORDER — QUETIAPINE FUMARATE 25 MG/1
50 TABLET, FILM COATED ORAL NIGHTLY
COMMUNITY

## 2024-09-25 RX ORDER — ESCITALOPRAM OXALATE 10 MG/1
10 TABLET ORAL DAILY
COMMUNITY

## 2024-09-25 RX ORDER — M-VIT,TX,IRON,MINS/CALC/FOLIC 27MG-0.4MG
1 TABLET ORAL DAILY
COMMUNITY

## 2024-09-25 RX ORDER — ASPIRIN 300 MG/1
300 SUPPOSITORY RECTAL DAILY
Status: DISCONTINUED | OUTPATIENT
Start: 2024-09-26 | End: 2024-09-26

## 2024-09-25 RX ORDER — PIMAVANSERIN TARTRATE 34 MG/1
34 CAPSULE ORAL DAILY
COMMUNITY

## 2024-09-25 RX ORDER — SODIUM CHLORIDE 9 MG/ML
INJECTION, SOLUTION INTRAVENOUS PRN
Status: DISCONTINUED | OUTPATIENT
Start: 2024-09-25 | End: 2024-09-27 | Stop reason: HOSPADM

## 2024-09-25 RX ORDER — FLUDROCORTISONE ACETATE 0.1 MG/1
0.1 TABLET ORAL DAILY
Status: ON HOLD | COMMUNITY
End: 2024-09-27

## 2024-09-25 RX ADMIN — IOPAMIDOL 100 ML: 755 INJECTION, SOLUTION INTRAVENOUS at 19:14

## 2024-09-25 RX ADMIN — IOPAMIDOL 20 ML: 755 INJECTION, SOLUTION INTRAVENOUS at 19:14

## 2024-09-25 ASSESSMENT — PAIN - FUNCTIONAL ASSESSMENT: PAIN_FUNCTIONAL_ASSESSMENT: NONE - DENIES PAIN

## 2024-09-25 NOTE — PROGRESS NOTES
Neurocritical Care Code Stroke Documentation      Symptoms: Aphasia, L sided weakness with L facial drpp[   Baseline mRS:   1   Last Known Well: 1800   Medical hx: Past Medical History:   Diagnosis Date    Dementia (HCC)     Depression     GERD (gastroesophageal reflux disease)     Parkinson disease (HCC)       Vitals: There were no vitals filed for this visit.   Anticoagulation:  Eliquis   VAN:   Negative   NIHSS:   1a-LOC:0    1b-Month/Age:2    1c-Open/Close Hand:0    2-Best Gaze:0    3-Visual Fields:0    4-Facial Palsy:0    5a-Left Arm:0    5b-Right Arm:0    6a-Left Le    6b-Right Le    7-Limb Ataxia:0    8-Sensory:0    9-Best Language:1    10-Dysarthria:0    11-Extinction/Inattention:0  TOTAL SCORE:3   Imaging (personally reviewed):   CT: No acute intracranial processes    CTA/CTP: No LVO, no perfusion deficit   Plan:   TNK Candidate: NO    Mechanical thrombectomy Candidate: NO    *Perform dysphagia screening prior to any PO intake*     Discussed with: Dr Jules, Dr Zacarias      Arrival time: Met EMS in ED    I have spent 40 mins of critical care time involved in lab review, consultations with specialist, family decision making and documentation. During this entire length of time I was immediately available to the patient.    ISHA Milian - CNP  Neurocritical Care Nurse Practitioner  [phone]

## 2024-09-25 NOTE — ED PROVIDER NOTES
[facility] EMERGENCY DEP  EMERGENCY DEPARTMENT ENCOUNTER      Pt Name: Hanane Lee  MRN: [id number]  Birthdate 1950  Date of evaluation: 9/25/2024  Provider: Clarke Jules MD    CHIEF COMPLAINT     No chief complaint on file.        HISTORY OF PRESENT ILLNESS   (Location/Symptom, Timing/Onset, Context/Setting, Quality, Duration, Modifying Factors, Severity)  Note limiting factors.   Hanane Lee is a 74 y.o. female who presents to the emergency department      The history is provided by the patient and the EMS personnel. The history is limited by the condition of the patient. No  was used.   Altered Mental Status  Presenting symptoms: lethargy    Severity:  Severe  Most recent episode:  Today  Episode history:  Continuous  Progression:  Unchanged  Chronicity:  New  Context: dementia        Nursing Notes were reviewed.    REVIEW OF SYSTEMS    (2-9 systems for level 4, 10 or more for level 5)     Review of Systems   Unable to perform ROS: Mental status change       Except as noted above the remainder of the review of systems was reviewed and negative.       PAST MEDICAL HISTORY     Past Medical History:   Diagnosis Date    Dementia ([facility])     Depression     GERD (gastroesophageal reflux disease)     Parkinson disease (HCC) 2015         SURGICAL HISTORY     No past surgical history on file.      CURRENT MEDICATIONS       Previous Medications    HYDROCODONE-ACETAMINOPHEN (NORCO) 5-325 MG PER TABLET    Take 1 tablet by mouth every 4 hours as needed.       ALLERGIES     Sulfa antibiotics and Sulfamethoxazole-trimethoprim    FAMILY HISTORY     No family history on file.       SOCIAL HISTORY       Social History     Socioeconomic History    Marital status:    Tobacco Use    Smoking status: Never    Smokeless tobacco: Never   Substance and Sexual Activity    Alcohol use: Never    Drug use: Never     Social Determinants of Health     Food Insecurity: No Food Insecurity (5/3/2024)    Received  Emergency Department Physician who either signs or Co-signs this chart in the absence of a cardiologist.        RADIOLOGY:   Non-plain film images such as CT, Ultrasound and MRI are read by the radiologist. Plain radiographic images are visualized and preliminarily interpreted by the emergency physician with the below findings:        Interpretation per the Radiologist below, if available at the time of this note:    CT HEAD WO CONTRAST    (Results Pending)   CTA HEAD NECK W CONTRAST    (Results Pending)   CT BRAIN PERFUSION    (Results Pending)         ED BEDSIDE ULTRASOUND:   Performed by ED Physician - none    LABS:  Labs Reviewed   CBC WITH AUTO DIFFERENTIAL   COMPREHENSIVE METABOLIC PANEL   PROTIME-INR   TROPONIN   POCT GLUCOSE       All other labs were within normal range or not returned as of this dictation.    EMERGENCY DEPARTMENT COURSE and DIFFERENTIAL DIAGNOSIS/MDM:   Vitals:  There were no vitals filed for this visit.        Medical Decision Making  Amount and/or Complexity of Data Reviewed  Labs: ordered.  Radiology: ordered.  ECG/medicine tests: ordered.    Risk  Decision regarding hospitalization.    This is a 5-year-old female with past medical history, review of systems, physical exam as above, presenting via EMS for concerns of possible CVA.  Per EMS report, patient eating dinner at 1800 which she had acute onset mental status change, dysarthria, with left-sided deficits.  EMS notes she was hypoglycemic in the field in the 50s, no history of diabetes, received dextrose, without improvement.  During transport her left-sided deficits have begun to resolve.  Upon arrival vital signs notable for normal blood pressure, afebrile, tachycardic, satting well on room air.    She is awake, alert, appears to be experiencing some aphasia, however follows commands without apparent neurologic deficit otherwise.  Differential including metabolic encephalopathy acute CVA, plan to obtain lab work, urine, emergent

## 2024-09-26 ENCOUNTER — APPOINTMENT (OUTPATIENT)
Facility: HOSPITAL | Age: 74
End: 2024-09-26
Payer: MEDICARE

## 2024-09-26 ENCOUNTER — APPOINTMENT (OUTPATIENT)
Facility: HOSPITAL | Age: 74
End: 2024-09-26
Attending: FAMILY MEDICINE
Payer: MEDICARE

## 2024-09-26 LAB
APPEARANCE UR: CLEAR
BACTERIA URNS QL MICRO: NEGATIVE /HPF
BILIRUB UR QL: NEGATIVE
CHOLEST SERPL-MCNC: 135 MG/DL
COLOR UR: NORMAL
ECHO AO ROOT DIAM: 3.3 CM
ECHO AO ROOT INDEX: 1.63 CM/M2
ECHO AV AREA PEAK VELOCITY: 3.4 CM2
ECHO AV AREA/BSA PEAK VELOCITY: 1.7 CM2/M2
ECHO AV PEAK GRADIENT: 8 MMHG
ECHO AV PEAK VELOCITY: 1.4 M/S
ECHO AV VELOCITY RATIO: 0.64
ECHO BSA: 2.08 M2
ECHO LA DIAMETER INDEX: 2.13 CM/M2
ECHO LA DIAMETER: 4.3 CM
ECHO LA TO AORTIC ROOT RATIO: 1.3
ECHO LV E' LATERAL VELOCITY: 8.9 CM/S
ECHO LV E' SEPTAL VELOCITY: 9.8 CM/S
ECHO LV EF PHYSICIAN: 60 %
ECHO LV FRACTIONAL SHORTENING: 37 % (ref 28–44)
ECHO LV INTERNAL DIMENSION DIASTOLE INDEX: 2.28 CM/M2
ECHO LV INTERNAL DIMENSION DIASTOLIC: 4.6 CM (ref 3.9–5.3)
ECHO LV INTERNAL DIMENSION SYSTOLIC INDEX: 1.44 CM/M2
ECHO LV INTERNAL DIMENSION SYSTOLIC: 2.9 CM
ECHO LV IVSD: 1.3 CM (ref 0.6–0.9)
ECHO LV MASS 2D: 217.4 G (ref 67–162)
ECHO LV MASS INDEX 2D: 107.6 G/M2 (ref 43–95)
ECHO LV POSTERIOR WALL DIASTOLIC: 1.2 CM (ref 0.6–0.9)
ECHO LV RELATIVE WALL THICKNESS RATIO: 0.52
ECHO LVOT AREA: 4.9 CM2
ECHO LVOT DIAM: 2.5 CM
ECHO LVOT PEAK GRADIENT: 3 MMHG
ECHO LVOT PEAK VELOCITY: 0.9 M/S
ECHO MV A VELOCITY: 0.61 M/S
ECHO MV AREA PHT: 2.4 CM2
ECHO MV E DECELERATION TIME (DT): 312.1 MS
ECHO MV E VELOCITY: 0.44 M/S
ECHO MV E/A RATIO: 0.72
ECHO MV E/E' LATERAL: 4.94
ECHO MV E/E' RATIO (AVERAGED): 4.72
ECHO MV E/E' SEPTAL: 4.49
ECHO MV EROA PISA: 0.1 CM2
ECHO MV PRESSURE HALF TIME (PHT): 90.5 MS
ECHO MV REGURGITANT ALIASING (NYQUIST) VELOCITY: 33 CM/S
ECHO MV REGURGITANT RADIUS PISA: 0.5 CM
ECHO MV REGURGITANT VELOCITY PISA: 4.4 M/S
ECHO MV REGURGITANT VOLUME PISA: 20.65 ML
ECHO MV REGURGITANT VTIA: 175.4 CM
ECHO RV FREE WALL PEAK S': 16.9 CM/S
ECHO RV TAPSE: 2.5 CM (ref 1.7–?)
ECHO TV REGURGITANT MAX VELOCITY: 2.05 M/S
ECHO TV REGURGITANT PEAK GRADIENT: 17 MMHG
EKG ATRIAL RATE: 72 BPM
EKG DIAGNOSIS: NORMAL
EKG DIAGNOSIS: NORMAL
EKG P AXIS: 46 DEGREES
EKG P-R INTERVAL: 158 MS
EKG Q-T INTERVAL: 418 MS
EKG Q-T INTERVAL: 418 MS
EKG QRS DURATION: 100 MS
EKG QRS DURATION: 138 MS
EKG QTC CALCULATION (BAZETT): 457 MS
EKG QTC CALCULATION (BAZETT): 549 MS
EKG R AXIS: 2 DEGREES
EKG R AXIS: 5 DEGREES
EKG T AXIS: 190 DEGREES
EKG T AXIS: 26 DEGREES
EKG VENTRICULAR RATE: 104 BPM
EKG VENTRICULAR RATE: 72 BPM
EPITH CASTS URNS QL MICRO: NORMAL /LPF
ERYTHROCYTE [DISTWIDTH] IN BLOOD BY AUTOMATED COUNT: 12.5 % (ref 11.5–14.5)
EST. AVERAGE GLUCOSE BLD GHB EST-MCNC: 108 MG/DL
GLUCOSE BLD STRIP.AUTO-MCNC: 114 MG/DL (ref 65–117)
GLUCOSE BLD STRIP.AUTO-MCNC: 125 MG/DL (ref 65–117)
GLUCOSE BLD STRIP.AUTO-MCNC: 127 MG/DL (ref 65–117)
GLUCOSE BLD STRIP.AUTO-MCNC: 90 MG/DL (ref 65–117)
GLUCOSE BLD STRIP.AUTO-MCNC: 97 MG/DL (ref 65–117)
GLUCOSE UR STRIP.AUTO-MCNC: NEGATIVE MG/DL
HBA1C MFR BLD: 5.4 % (ref 4–5.6)
HCT VFR BLD AUTO: 42.5 % (ref 35–47)
HDLC SERPL-MCNC: 72 MG/DL
HDLC SERPL: 1.9 (ref 0–5)
HGB BLD-MCNC: 14.1 G/DL (ref 11.5–16)
HGB UR QL STRIP: NEGATIVE
HYALINE CASTS URNS QL MICRO: NORMAL /LPF (ref 0–5)
KETONES UR QL STRIP.AUTO: NEGATIVE MG/DL
LDLC SERPL CALC-MCNC: 51.8 MG/DL (ref 0–100)
LEUKOCYTE ESTERASE UR QL STRIP.AUTO: NEGATIVE
MCH RBC QN AUTO: 33.7 PG (ref 26–34)
MCHC RBC AUTO-ENTMCNC: 33.2 G/DL (ref 30–36.5)
MCV RBC AUTO: 101.7 FL (ref 80–99)
NITRITE UR QL STRIP.AUTO: NEGATIVE
NRBC # BLD: 0 K/UL (ref 0–0.01)
NRBC BLD-RTO: 0 PER 100 WBC
PH UR STRIP: 5 (ref 5–8)
PLATELET # BLD AUTO: 154 K/UL (ref 150–400)
PMV BLD AUTO: 11.4 FL (ref 8.9–12.9)
PROT UR STRIP-MCNC: NEGATIVE MG/DL
RBC # BLD AUTO: 4.18 M/UL (ref 3.8–5.2)
RBC #/AREA URNS HPF: NORMAL /HPF (ref 0–5)
SERVICE CMNT-IMP: ABNORMAL
SERVICE CMNT-IMP: ABNORMAL
SERVICE CMNT-IMP: NORMAL
SP GR UR REFRACTOMETRY: 1.01 (ref 1–1.03)
TRIGL SERPL-MCNC: 56 MG/DL
URINE CULTURE IF INDICATED: NORMAL
UROBILINOGEN UR QL STRIP.AUTO: 0.2 EU/DL (ref 0.2–1)
VLDLC SERPL CALC-MCNC: 11.2 MG/DL
WBC # BLD AUTO: 3.8 K/UL (ref 3.6–11)
WBC URNS QL MICRO: NORMAL /HPF (ref 0–4)

## 2024-09-26 PROCEDURE — 97535 SELF CARE MNGMENT TRAINING: CPT

## 2024-09-26 PROCEDURE — 85027 COMPLETE CBC AUTOMATED: CPT

## 2024-09-26 PROCEDURE — 80061 LIPID PANEL: CPT

## 2024-09-26 PROCEDURE — 6370000000 HC RX 637 (ALT 250 FOR IP): Performed by: FAMILY MEDICINE

## 2024-09-26 PROCEDURE — 83036 HEMOGLOBIN GLYCOSYLATED A1C: CPT

## 2024-09-26 PROCEDURE — 93010 ELECTROCARDIOGRAM REPORT: CPT | Performed by: INTERNAL MEDICINE

## 2024-09-26 PROCEDURE — 36415 COLL VENOUS BLD VENIPUNCTURE: CPT

## 2024-09-26 PROCEDURE — 92610 EVALUATE SWALLOWING FUNCTION: CPT

## 2024-09-26 PROCEDURE — 71045 X-RAY EXAM CHEST 1 VIEW: CPT

## 2024-09-26 PROCEDURE — 82962 GLUCOSE BLOOD TEST: CPT

## 2024-09-26 PROCEDURE — 6370000000 HC RX 637 (ALT 250 FOR IP)

## 2024-09-26 PROCEDURE — 2060000000 HC ICU INTERMEDIATE R&B

## 2024-09-26 PROCEDURE — 6370000000 HC RX 637 (ALT 250 FOR IP): Performed by: HOSPITALIST

## 2024-09-26 PROCEDURE — 81001 URINALYSIS AUTO W/SCOPE: CPT

## 2024-09-26 PROCEDURE — 93005 ELECTROCARDIOGRAM TRACING: CPT | Performed by: FAMILY MEDICINE

## 2024-09-26 PROCEDURE — 97530 THERAPEUTIC ACTIVITIES: CPT

## 2024-09-26 PROCEDURE — 2580000003 HC RX 258: Performed by: FAMILY MEDICINE

## 2024-09-26 PROCEDURE — 97112 NEUROMUSCULAR REEDUCATION: CPT

## 2024-09-26 PROCEDURE — 70551 MRI BRAIN STEM W/O DYE: CPT

## 2024-09-26 PROCEDURE — 97162 PT EVAL MOD COMPLEX 30 MIN: CPT

## 2024-09-26 PROCEDURE — 93306 TTE W/DOPPLER COMPLETE: CPT | Performed by: INTERNAL MEDICINE

## 2024-09-26 PROCEDURE — 99223 1ST HOSP IP/OBS HIGH 75: CPT | Performed by: PSYCHIATRY & NEUROLOGY

## 2024-09-26 PROCEDURE — 93306 TTE W/DOPPLER COMPLETE: CPT

## 2024-09-26 PROCEDURE — 97165 OT EVAL LOW COMPLEX 30 MIN: CPT

## 2024-09-26 RX ORDER — TROSPIUM CHLORIDE 20 MG/1
20 TABLET, FILM COATED ORAL 2 TIMES DAILY
Status: DISCONTINUED | OUTPATIENT
Start: 2024-09-26 | End: 2024-09-27 | Stop reason: HOSPADM

## 2024-09-26 RX ORDER — CARBIDOPA AND LEVODOPA 25; 100 MG/1; MG/1
2 TABLET, EXTENDED RELEASE ORAL 3 TIMES DAILY
COMMUNITY

## 2024-09-26 RX ORDER — 0.9 % SODIUM CHLORIDE 0.9 %
500 INTRAVENOUS SOLUTION INTRAVENOUS ONCE
Status: DISCONTINUED | OUTPATIENT
Start: 2024-09-26 | End: 2024-09-27 | Stop reason: HOSPADM

## 2024-09-26 RX ORDER — PANTOPRAZOLE SODIUM 40 MG/1
40 TABLET, DELAYED RELEASE ORAL EVERY OTHER DAY
Status: DISCONTINUED | OUTPATIENT
Start: 2024-09-26 | End: 2024-09-27 | Stop reason: HOSPADM

## 2024-09-26 RX ORDER — DONEPEZIL HYDROCHLORIDE 10 MG/1
10 TABLET, FILM COATED ORAL EVERY EVENING
COMMUNITY

## 2024-09-26 RX ORDER — DEXTROSE MONOHYDRATE AND SODIUM CHLORIDE 5; .9 G/100ML; G/100ML
INJECTION, SOLUTION INTRAVENOUS CONTINUOUS
Status: DISCONTINUED | OUTPATIENT
Start: 2024-09-26 | End: 2024-09-27 | Stop reason: HOSPADM

## 2024-09-26 RX ORDER — FLUDROCORTISONE ACETATE 0.1 MG/1
0.1 TABLET ORAL DAILY
Status: DISCONTINUED | OUTPATIENT
Start: 2024-09-26 | End: 2024-09-27

## 2024-09-26 RX ORDER — CARBIDOPA AND LEVODOPA 50; 200 MG/1; MG/1
1 TABLET, EXTENDED RELEASE ORAL 3 TIMES DAILY
Status: DISCONTINUED | OUTPATIENT
Start: 2024-09-26 | End: 2024-09-27 | Stop reason: HOSPADM

## 2024-09-26 RX ORDER — ALBUMIN (HUMAN) 12.5 G/50ML
25 SOLUTION INTRAVENOUS EVERY 6 HOURS
Status: DISPENSED | OUTPATIENT
Start: 2024-09-26 | End: 2024-09-27

## 2024-09-26 RX ORDER — CARBIDOPA AND LEVODOPA 25; 100 MG/1; MG/1
2 TABLET ORAL 3 TIMES DAILY
Status: DISCONTINUED | OUTPATIENT
Start: 2024-09-26 | End: 2024-09-26 | Stop reason: SDUPTHER

## 2024-09-26 RX ORDER — ATORVASTATIN CALCIUM 20 MG/1
20 TABLET, FILM COATED ORAL NIGHTLY
Status: DISCONTINUED | OUTPATIENT
Start: 2024-09-26 | End: 2024-09-27 | Stop reason: HOSPADM

## 2024-09-26 RX ORDER — MECLIZINE HYDROCHLORIDE 25 MG/1
25 TABLET ORAL 2 TIMES DAILY PRN
COMMUNITY

## 2024-09-26 RX ORDER — QUETIAPINE FUMARATE 25 MG/1
50 TABLET, FILM COATED ORAL NIGHTLY
Status: DISCONTINUED | OUTPATIENT
Start: 2024-09-26 | End: 2024-09-27 | Stop reason: HOSPADM

## 2024-09-26 RX ORDER — SODIUM CHLORIDE 9 MG/ML
INJECTION, SOLUTION INTRAVENOUS CONTINUOUS
Status: DISCONTINUED | OUTPATIENT
Start: 2024-09-26 | End: 2024-09-26

## 2024-09-26 RX ORDER — DONEPEZIL HYDROCHLORIDE 5 MG/1
10 TABLET, FILM COATED ORAL EVERY EVENING
Status: DISCONTINUED | OUTPATIENT
Start: 2024-09-26 | End: 2024-09-27 | Stop reason: HOSPADM

## 2024-09-26 RX ADMIN — APIXABAN 5 MG: 5 TABLET, FILM COATED ORAL at 21:45

## 2024-09-26 RX ADMIN — CARBIDOPA AND LEVODOPA 2 TABLET: 25; 100 TABLET ORAL at 14:35

## 2024-09-26 RX ADMIN — ASPIRIN 81 MG CHEWABLE TABLET 81 MG: 81 TABLET CHEWABLE at 09:42

## 2024-09-26 RX ADMIN — ATORVASTATIN CALCIUM 80 MG: 40 TABLET, FILM COATED ORAL at 01:09

## 2024-09-26 RX ADMIN — SODIUM CHLORIDE, PRESERVATIVE FREE 10 ML: 5 INJECTION INTRAVENOUS at 01:10

## 2024-09-26 RX ADMIN — CARBIDOPA AND LEVODOPA 1 TABLET: 50; 200 TABLET, EXTENDED RELEASE ORAL at 22:16

## 2024-09-26 RX ADMIN — FLUDROCORTISONE ACETATE 0.1 MG: 0.1 TABLET ORAL at 09:42

## 2024-09-26 RX ADMIN — DONEPEZIL HYDROCHLORIDE 10 MG: 5 TABLET, FILM COATED ORAL at 17:56

## 2024-09-26 RX ADMIN — TROSPIUM CHLORIDE 20 MG: 20 TABLET, FILM COATED ORAL at 21:45

## 2024-09-26 RX ADMIN — DEXTROSE AND SODIUM CHLORIDE: 5; 900 INJECTION, SOLUTION INTRAVENOUS at 06:32

## 2024-09-26 RX ADMIN — TROSPIUM CHLORIDE 20 MG: 20 TABLET, FILM COATED ORAL at 09:42

## 2024-09-26 RX ADMIN — SODIUM CHLORIDE, PRESERVATIVE FREE 10 ML: 5 INJECTION INTRAVENOUS at 21:46

## 2024-09-26 RX ADMIN — APIXABAN 5 MG: 5 TABLET, FILM COATED ORAL at 09:42

## 2024-09-26 RX ADMIN — SODIUM CHLORIDE: 9 INJECTION, SOLUTION INTRAVENOUS at 03:55

## 2024-09-26 RX ADMIN — QUETIAPINE FUMARATE 50 MG: 25 TABLET ORAL at 22:16

## 2024-09-26 RX ADMIN — CARBIDOPA AND LEVODOPA 2 TABLET: 25; 100 TABLET ORAL at 09:42

## 2024-09-26 NOTE — PLAN OF CARE
Problem: Physical Therapy - Adult  Goal: By Discharge: Performs mobility at highest level of function for planned discharge setting.  See evaluation for individualized goals.  Description: FUNCTIONAL STATUS PRIOR TO ADMISSION: Patient was modified independent using a rolling walker and rollator for functional mobility.    HOME SUPPORT PRIOR TO ADMISSION: The patient lived with daughter and granddaughters and required moderate assistance for IADL's, ADL's.    Physical Therapy Goals  Initiated 9/26/2024  1.  Patient will move from supine to sit and sit to supine, scoot up and down, and roll side to side in bed with minimal assistance within 7 day(s).    2.  Patient will perform sit to stand with contact guard assist within 7 day(s).  3.  Patient will transfer from bed to chair and chair to bed with contact guard assist using the least restrictive device within 7 day(s).  4.  Patient will ambulate with contact guard assist for 50 feet with the least restrictive device within 7 day(s).   5.  Patient will ascend/descend 5 stairs with one handrail(s) with minimal assistance within 7 day(s).  6.  Patient will improve Jolly Balance score by 4 points within 7 days.   Outcome: Progressing   PHYSICAL THERAPY EVALUATION    Patient: Hanane Lee (74 y.o. female)  Date: 9/26/2024  Primary Diagnosis: Focal neurological deficit [R29.818]       Precautions:                        ASSESSMENT :   DEFICITS/IMPAIRMENTS:   The patient is limited by decreased functional mobility, decreased activity tolerance, impaired cognition (tangential conversation, hallucinations), intermittent deficits with command following, impaired attention/concentration, impulsivity, impaired sitting/standing balance, orthostatic hypotension, BUE tremors (R worse than L). This is a 74 year old female with PMH including PD and Lewy Body dementia who presented to hospital after granddaughter observed prolonged AMS and dysarthria (over 30 minutes). Per  Exceptions  Arousal/Alertness: Appears intact  Following Commands: Follows one step commands consistently  Attention Span: Attends with cues to redirect;Difficulty dividing attention  Memory: Impaired  Safety Judgement: Impaired  Problem Solving: Impaired  Insights: Impaired  Initiation: Requires cues for some  Sequencing: Requires cues for some  Cognition Comment: word finding/word salad speech at times    Hearing:   Hearing  Hearing: Within functional limits    Vision/Perceptual:          Vision  Vision: Within Functional Limits  Perception  Overall Perceptual Status: Impaired  Unilateral Attention: Cues to maintain midline in sitting;Cues to maintain midline in standing  Initiation: Cues to initiate tasks  Motor Planning:  (verbal cues needed to plan scooting and stand>sit)  Perseveration: Not present    Strength:    Strength: Within functional limits    Tone & Sensation:   Tone: Normal  Sensation: Intact    Coordination:  Coordination: Generally decreased, functional    Range Of Motion:  AROM: Within functional limits  PROM: Within functional limits    Functional Mobility:  Bed Mobility:     Bed Mobility Training  Bed Mobility Training: Yes  Supine to Sit: Moderate assistance  Sit to Supine: Moderate assistance;Assist X2  Scooting: Minimum assistance  Transfers:     Transfer Training  Transfer Training: Yes  Sit to Stand: Minimum assistance;Assist X2  Stand to Sit: Minimum assistance;Assist X2  Balance:      Balance  Sitting: Intact  Standing: Impaired  Standing - Static: Constant support;Fair  Standing - Dynamic: Constant support;Fair  Ambulation/Gait Training:     Gait  Gait Training: No (takes 3-4 steps laterally toward HOB with MIN A and use of RW)                                                                                                                                                                                                                                       Intervention/Education specific                                                                                                                                                         Activity Tolerance:   Fair , requires rest breaks, and signs and symptoms of orthostatic hypotension    After treatment:   Patient left in no apparent distress in bed, Call bell within reach, and Caregiver / family present    COMMUNICATION/EDUCATION:   The patient's plan of care was discussed with: occupational therapist, speech therapist, and registered nurse    Patient Education  Education Given To: Patient  Education Provided: Role of Therapy;Plan of Care;Fall Prevention Strategies  Education Provided Comments: BEFAST  Education Method: Verbal;Printed Information/Hand-outs  Barriers to Learning: Cognition  Education Outcome: Verbalized understanding;Continued education needed    Thank you for this referral.  Yamilet Morrison PT, DPT  Minutes: 36      Physical Therapy Evaluation Charge Determination   History Examination Presentation Decision-Making   HIGH Complexity :3+ comorbidities / personal factors will impact the outcome/ POC  LOW Complexity : 1-2 Standardized tests and measures addressing body structure, function, activity limitation and / or participation in recreation  MEDIUM Complexity : Evolving with changing characteristics  Jolly Balance Test  HIGH    Based on the above components, the patient evaluation is determined to be of the following complexity level: Medium

## 2024-09-26 NOTE — PROGRESS NOTES
Hospitalist Progress Note  Earlene Mc MD  Answering service: [phone] OR [phone] from in house phone        Date of Service:  2024  NAME:  Hanane Lee  :  1950  MRN:  [id number]      Admission Summary:   Hanane Lee is a 74 y.o. female with past stroke with aphasia, atrial fibrillation, Parkinson disease, dementia, depression, and GERD who with acute expressive aphasia at about 6 PM this evening associated with ataxia.  Per patient's granddaughter who is at bedside, and gives majority of history they were eating dinner when patient developed her symptoms she states that patient was saying inappropriate words, and could not ambulate in spite of the fact that she ambulates with a walker at baseline.        Interval history / Subjective:   Fu for facial droop TIA like symptoms w/u negative  ECHO pending , apparently per family this is a sudden decline   Pt was not on aspirin      Assessment & Plan:     #Expressive aphasia  #Left facial droop  #Ataxia  - Symptoms concerning for new stroke  - Initial NIH 3, teleneurology consulted and recommended admission for stroke workup  - Admit to neurotelemetry  - Serial neuro checks q4   - Permissive hypertension  - N.p.o. pending swallow evaluation  - PT/OT/speech  - MRI brain, -- Chronic microvascular ischemic disease with no acute infarction. Slight motion degradation ECHO pending  - Neurology consulted     #Atrial fibrillation with RVR  - Patient given IV fluids for suspected dehydration  - She subsequently converted to normal sinus rhyth  -Continue home Eliquis     #Parkinson's  #Dementia  - Continue home medications    #Mild hypokalemia : replete oral       Code status: DDNR  Prophylaxis: Eliquis  Care Plan discussed with: Family  Anticipated Disposition: 24 hrs      Principal Problem:    Focal neurological deficit  Resolved Problems:    * No resolved  \"CPKMB\", \"CKNDX\", \"TROIQ\"  Lab Results   Component Value Date/Time    CHOL 135 09/26/2024 06:18 AM    HDL 72 09/26/2024 06:18 AM    LDL 51.8 09/26/2024 06:18 AM     No results found for: \"GLUCPOC\"        Medications Reviewed:     Current Facility-Administered Medications   Medication Dose Route Frequency    dextrose 5 % and 0.9 % sodium chloride infusion   IntraVENous Continuous    apixaban (ELIQUIS) tablet 5 mg  5 mg Oral BID    trospium (SANCTURA) tablet 20 mg  20 mg Oral BID    QUEtiapine (SEROQUEL) tablet 50 mg  50 mg Oral Nightly    pimavanserin tartrate (NUPLAZID) capsule 34 mg  34 mg Oral Daily    pantoprazole (PROTONIX) tablet 40 mg  40 mg Oral Every Other Day    fludrocortisone (FLORINEF) tablet 0.1 mg  0.1 mg Oral Daily    carbidopa-levodopa (SINEMET)  MG per tablet 2 tablet  2 tablet Oral TID    sodium chloride flush 0.9 % injection 5-40 mL  5-40 mL IntraVENous 2 times per day    sodium chloride flush 0.9 % injection 5-40 mL  5-40 mL IntraVENous PRN    0.9 % sodium chloride infusion   IntraVENous PRN    ondansetron (ZOFRAN-ODT) disintegrating tablet 4 mg  4 mg Oral Q8H PRN    Or    ondansetron (ZOFRAN) injection 4 mg  4 mg IntraVENous Q6H PRN    polyethylene glycol (GLYCOLAX) packet 17 g  17 g Oral Daily PRN    aspirin chewable tablet 81 mg  81 mg Oral Daily    Or    aspirin suppository 300 mg  300 mg Rectal Daily    atorvastatin (LIPITOR) tablet 80 mg  80 mg Oral Nightly     Current Outpatient Medications   Medication Sig    donepezil (ARICEPT) 10 MG tablet Take 1 tablet by mouth every evening    vibegron (GEMTESA) 75 MG TABS tablet Take 1 tablet by mouth daily    meclizine (ANTIVERT) 25 MG tablet Take 1 tablet by mouth 2 times daily as needed for Dizziness or Nausea    pimavanserin tartrate (NUPLAZID) 34 MG CAPS capsule Take 1 capsule by mouth daily    apixaban (ELIQUIS) 5 MG TABS tablet Take 1 tablet by mouth 2 times daily    fludrocortisone (FLORINEF) 0.1 MG tablet Take 1 tablet by mouth daily     cephALEXin (KEFLEX) 250 MG capsule Take 1 capsule by mouth 4 times daily    estradiol (ESTRACE) 0.1 MG/GM vaginal cream Place 2 g vaginally daily    trospium (SANCTURA) 60 MG CP24 extended release capsule Take 1 capsule by mouth daily    QUEtiapine (SEROQUEL) 25 MG tablet Take 2 tablets by mouth at bedtime    escitalopram (LEXAPRO) 10 MG tablet Take 1 tablet by mouth daily    D-Mannose 500 MG CAPS Take 2 capsules by mouth in the morning, at noon, and at bedtime    albuterol sulfate HFA (VENTOLIN HFA) 108 (90 Base) MCG/ACT inhaler Inhale 2 puffs into the lungs every 6 hours as needed for Wheezing    carbidopa-levodopa (SINEMET)  MG per tablet Take 1 tablet by mouth 3 times daily    omeprazole (PRILOSEC) 20 MG delayed release capsule Take 1 capsule by mouth every other day    calcium carbonate 600 MG TABS tablet Take 1 tablet by mouth daily    Multiple Vitamins-Minerals (THERAPEUTIC MULTIVITAMIN-MINERALS) tablet Take 1 tablet by mouth daily    acetaminophen (TYLENOL) 500 MG tablet Take 1 tablet by mouth every 6 hours as needed for Pain    melatonin 3 MG TABS tablet Take 5 mg by mouth daily    HYDROcodone-acetaminophen (NORCO) 5-325 MG per tablet Take 1 tablet by mouth every 4 hours as needed.     ______________________________________________________________________  EXPECTED LENGTH OF STAY: Unable to retrieve estimated LOS  ACTUAL LENGTH OF STAY:          1                 Earlene Mc MD

## 2024-09-26 NOTE — PROGRESS NOTES
Trospium XR 60 mg daily is [employer] approved autosubstitution for Trospium 20 mg BID.  Contact Pharmacy with any questions.

## 2024-09-26 NOTE — CONSULTS
Neurology Consult Note     NAME: Hanane Lee   :  1950   MRN:  [id number]   DATE:  2024       HPI:  Pt is a 73yo RH female admitted 24 with aphasia and left sided weakness. NIHSS score 3 for orientation and language. Pt has lewy body dementia at baseline. CTH neg. CTA H/N no LVO, no significant stenosis. CTP neg. MRI brain with CIWM changes. EKG - Afib, on Eliquis at home.  CXR neg. TTE pending. LDL 51.8. HgbA1C 5.4. CBC with diff - .  UA neg. patient is seen with her daughter and granddaughter at the bedside.  Her granddaughter is with her at the time of this spell and is a /EMT and provides an excellent history.  Patient was in her normal state of health yesterday, was able to attend physical therapy without difficulty, and then had dinner around 6 PM patient indicated she was not feeling well and then began to have mumbled/garbled speech and was using at times normal words but they did not go together they were in the wrong order.  They were able to move her from the table to her recliner but she remained in an upright position.  They felt like her left mouth was drooping slightly and the patient was not following commands.  She has had a similar spell on 2 other occasions in the past that were called TIAs.  They waited about 20 minutes to see if this spell would improve when it did not they called 911.  She returned to baseline in the CT scanner.  They thought perhaps she was dehydrated.  Her blood pressure on arrival was 95/64 and .  Patient has history of labile blood pressures, possibly related to her Lewy body dementia and parkinsonism and at one time was on Florinef.  Patient is able to provide some components of her history, but cannot recall the events from yesterday.  She has never had a CVA that they know of, she  venous sinuses are patent. No abnormal parenchymal or meningeal  enhancement.    Mastoid air cells and paranasal sinuses are clear.    CTA NECK:    Great vessels: Normal arch anatomy with the origins patent.    Right subclavian artery: Patent    Left subclavian artery: Patent    Right common carotid artery: Mild disease of the bifurcation    Left common carotid artery: Moderate disease at the bifurcation    Cervical right internal carotid artery: Patent with no significant stenosis by  NASCET criteria.    Cervical left internal carotid artery: Moderate ostial disease with less than  50% stenosis by NASCET criteria.    Right vertebral artery: Patent    Left vertebral artery: Patent, arises directly from the aortic arch. Somewhat  diminutive compared to the right    The lung apices are clear. 1.3 cm left thyroid nodule.. No cervical  lymphadenopathy.    Measurements utilize NASCET criteria.    CTA HEAD:    Right cavernous internal carotid artery: Patent    Left cavernous internal carotid artery: Patent    Anterior cerebral arteries: Patent    Anterior communicating artery: Patent    Right middle cerebral artery: Patent    Left middle cerebral artery: Patent    Posterior communicating arteries: Widely patent on the left, diminutive on the  right.    Posterior cerebral arteries: Fetal origin on the left. Distal branches are  patent bilaterally.    Basilar artery: Patent    Distal vertebral arteries: Bilateral V4 segments are patent although the left is  somewhat diminutive compared to the right    No evidence for intracranial aneurysm or hemodynamically significant stenosis.    CT Perfusion:  Normal CT perfusion.    Impression  1. No acute vascular abnormality, no large vessel occlusion. No hemodynamically  significant stenosis, normal perfusion.    Electronically signed by ALF DOYLE        Assessment and Plan:   Pt is a 75yo RH female with Lewy body dementia with parkinsonism admitted 9/25/24 with aphasia and left

## 2024-09-26 NOTE — ED NOTES
TRANSFER - OUT REPORT:    Verbal report given to Anel on Hanane Lee  being transferred to 6S for routine progression of patient care       Report consisted of patient's Situation, Background, Assessment and   Recommendations(SBAR).     Information from the following report(s) Nurse Handoff Report, ED Encounter Summary, ED SBAR, Intake/Output, MAR, and Recent Results was reviewed with the receiving nurse.    [place] Fall Assessment:                           Lines:   Peripheral IV 09/25/24 Left Hand (Active)       Peripheral IV 09/25/24 Left;Proximal Forearm (Active)        Opportunity for questions and clarification was provided.      Patient transported with:  Monitor and Registered Nurse

## 2024-09-26 NOTE — PROGRESS NOTES
Physical Therapy 9/26/24    Orders received, chart reviewed and patient evaluated by physical therapy. Pending progression with skilled acute physical therapy, recommend:    Intermittent physical therapy up to 2-3x/week in previous living setting with additional support needed for all mobility     Recommend with nursing OOB to chair 3x/day and walking daily with one assist and rolling walker and gait belt . Thank you for completing as able in order to maintain patient strength, endurance and independence.     Full evaluation to follow.    Thank you for your consideration,    Yamilet Morrison, PT, DPT

## 2024-09-26 NOTE — PROGRESS NOTES
Admission Medication Reconciliation:    Information obtained from:  Patient guardians/provided medication list  RxQuery data available¹:  Yes    Comments/Recommendations: Updated PTA meds/reviewed patient's allergies.    1)  Met with patient family members who provided medication list.    2)  Medication changes (since last review):  Added  - Donepezil 10 mg (Family gives at home ~1700)  -Meclizine 25 mg BID PRN nausea/dizziness  -Gemtesa 75 mg    Adjusted  - Carbidopa-levodopa  mg adjusted to Carbidopa-levodopa ER  mg (two tablets three times a day)    Removed  - Hydrocodone-acetaminophen    3)  Relative informed me that they are still waiting on new supply of Pimavanserin tartrate (NUPLAZID) and expecting shipment Monday 9/30         ¹RxQuery pharmacy benefit data reflects medications filled and processed through the patient's insurance, however   this data does NOT capture whether the medication was picked up or is currently being taken by the patient.    Allergies:  Sulfa antibiotics and Sulfamethoxazole-trimethoprim    Significant PMH/Disease States:   Past Medical History:   Diagnosis Date    Atrial fibrillation (HCC)     Depression     GERD (gastroesophageal reflux disease)     Lewy body dementia with behavioral disturbance (HCC)     Followed at [facility] by Dr. Anuj Luther     Chief Complaint for this Admission:    Chief Complaint   Patient presents with    Facial Droop     Pt reports to ed via ems complaining of sudden onset of facial droop and altered mental status and slurred speech, witnessed by granddaughter- blood sugar by EMS was 55 and pt given d10, repeat glucose was 143 upon arrival to room 22, teleneurology Dr Zacarias     Prior to Admission Medications:   Prior to Admission Medications   Prescriptions Last Dose Informant   D-Mannose 500 MG CAPS 9/25/2024    Sig: Take 2 capsules by mouth in the morning, at noon, and at bedtime   HYDROcodone-acetaminophen (NORCO) 5-325 MG per tablet

## 2024-09-26 NOTE — PLAN OF CARE
Problem: Occupational Therapy - Adult  Goal: By Discharge: Performs self-care activities at highest level of function for planned discharge setting.  See evaluation for individualized goals.  Description: FUNCTIONAL STATUS PRIOR TO ADMISSION:    Receives Help From: Family, ADL Assistance: Needs assistance,  ,  ,  , Feeding: Independent, Toileting: Needs assistance,  , Ambulation Assistance: Independent, Transfer Assistance: Independent, Active : No     HOME SUPPORT: Patient lived with daughter and 2 granddaughters to provide assistance full time assistance. At baseline she mobilizes with rollator and requires some level of assistance for all ADLs    Occupational Therapy Goals:  Initiated 9/26/2024  1.  Patient will perform self-feeding with Set-up within 7 day(s).  2.  Patient will perform grooming with Minimal Assist within 7 day(s).  3.  Patient will perform upper body dressing with Minimal Assist within 7 day(s).  4.  Patient will perform toilet transfers with Minimal Assist  within 7 day(s).  5.  Patient will perform all aspects of toileting with Moderate Assist within 7 day(s).  6.  Patient will participate in upper extremity therapeutic exercise/activities with Minimal Assist for 5 minutes within 7 day(s).    7.  Patient will utilize energy conservation techniques during functional activities with verbal and visual cues within 7 day(s).    Outcome: Progressing   OCCUPATIONAL THERAPY EVALUATION    Patient: Hanane Lee (74 y.o. female)  Date: 9/26/2024  Primary Diagnosis: Focal neurological deficit [R29.818]         Precautions:                    ASSESSMENT :  The patient is limited by decreased functional mobility, independence in ADLs, strength, activity tolerance, endurance, cognition, coordination, balance, and increased word finding difficulties from baseline s/p admission for CVA workup . MRI and CT both - for acute abnormality. Pt with history of Lewy Body dementia and parkinson's currently  baseline; currently worse than normal R>L)            Tone & Sensation:   Tone: Normal  Sensation: Intact          Functional Mobility and Transfers for ADLs:    Bed Mobility:     Bed Mobility Training  Bed Mobility Training: Yes  Supine to Sit: Moderate assistance  Sit to Supine: Moderate assistance;Assist X2  Scooting: Minimum assistance    Transfers:      Transfer Training  Transfer Training: Yes  Sit to Stand: Minimum assistance;Assist X2  Stand to Sit: Minimum assistance;Assist X2                     Balance:      Balance  Sitting: Intact  Standing: Impaired  Standing - Static: Constant support;Fair  Standing - Dynamic: Constant support;Fair      ADL Assessment:          Feeding: Minimal assistance       Grooming: Moderate assistance       UE Bathing: Moderate assistance            LE Bathing: Maximum assistance       UE Dressing: Minimal assistance       LE Dressing: Maximum assistance       Toileting: Moderate assistance                         ADL Intervention and task modifications:        Pt educated on safe transfer techniques, with specific emphasis on proper hand placement to push up from seated surface rather than attempt to pull self up, fully positioning self in-front of desired seated location, feeling chair on back of legs and reaching back with 1-2 UE to slowly lower self to seated position.  Patient instructed and indicated understanding the benefits of maintaining activity tolerance, functional mobility, and independence with self care tasks during acute stay  to ensure safe return home and to baseline. Encouraged patient to increase frequency and duration OOB, be out of bed for all meals, perform daily ADLs (as approved by RN/MD regarding bathing etc), and performing functional mobility to/from bathroom.          [facility] AM-PACTM \"6 Clicks\"                                                       Daily Activity Inpatient Short Form  AM-PAC Daily Activity - Inpatient   How much help is

## 2024-09-26 NOTE — PLAN OF CARE
Speech LAnguage Pathology EVALUATION    Patient: Hanane Lee (74 y.o. female)  Date: 9/26/2024  Primary Diagnosis: Focal neurological deficit [R29.818]       Precautions:                     ASSESSMENT :  Patient was brought in due to having aphasia. Head CT and MRI were negative for stroke. She has a hx of Lewy body dementia and Parkinson's dx. Today her speech is fully intelligible and no dysarthria present. She is communicating at basic level functionally, is expressing her needs and conversing on familiar topics. Her swallowing is wnl for thins via straw, purees and solids. Mildly slow oral phase but functional. No overt s/s of aspiration and will recommend a easy to chew diet with meds whole. This is her baseline diet. Sometimes they crush her meds if needed.   Family feels she is still not communicating as well as usual. MRI negative. She was effectively expressing needs.     Patient will benefit from skilled intervention to address the above impairments.     PLAN :  Recommendations and Planned Interventions:  Diet: Easy to chew and thin liquids       Recommend next SLP session: assess for tolerance of diet     Acute SLP Services: Yes, SLP will continue to follow per plan of care.    Discharge Recommendations: Continue to assess pending progress     SUBJECTIVE:   Patient stated, she was from [place].     OBJECTIVE:     Past Medical History:   Diagnosis Date    Atrial fibrillation (HCC)     Dementia (HCC)     Depression     GERD (gastroesophageal reflux disease)     Parkinson disease (HCC) 2015   History reviewed. No pertinent surgical history.  Prior Level of Function/Home Situation:   Social/Functional History  Lives With: Family (lives with daughter and two granddaughter)  Type of Home: House  Home Layout: Two level, Able to Live on Main level with bedroom/bathroom  Home Access: Stairs to enter with rails  Entrance Stairs - Number of Steps: 6  Entrance Stairs - Rails: Left  Bathroom Shower/Tub: Walk-in  interventions, and recommended diet changes were discussed with: Registered nurse    Patient/family have participated as able in goal setting and plan of care    Thank you,  Jade Roy, SLP    SLP Individual Minutes  Time In: 0920  Time Out: 0940  Minutes: 20    Problem: SLP Adult - Impaired Swallowing  Goal: By Discharge: Advance to least restrictive diet without signs or symptoms of aspiration for planned discharge setting.  See evaluation for individualized goals.  Description: 9/26/2024  Speech path goals  1. Patient will tolerate safest, least restrictive diet with no overt s/s of aspiration.   Outcome: Progressing

## 2024-09-26 NOTE — H&P
History and Physical    Date of Service:  9/26/2024  Primary Care Provider: Sun Alatorre APRN - NP  Source of information: patient, electronic medical record    Chief Complaint: Facial Droop (Pt reports to ed via ems complaining of sudden onset of facial droop and altered mental status and slurred speech, witnessed by granddaughter- blood sugar by EMS was 55 and pt given d10, repeat glucose was 143 upon arrival to room 22, teleneurology Dr Zacarias)      History of Presenting Illness:   Hanane Lee is a 74 y.o. female with past stroke with aphasia, atrial fibrillation, Parkinson disease, dementia, depression, and GERD who with acute expressive aphasia at about 6 PM this evening associated with ataxia.  Per patient's granddaughter who is at bedside, and gives majority of history they were eating dinner when patient developed her symptoms she states that patient was saying inappropriate words, and could not ambulate in spite of the fact that she ambulates with a walker at baseline.    In the ED, she was tachycardic to 122.  Other vital signs are stable.  Labs significant for mild thrombocytopenia with platelets 128.   CT head, CTA head/neck, CT brain perfusion negative    In the ED, we started her on maintenance IV fluid with glucose.  Teleneurology was consulted for evaluation, and recommended admission for stroke workup.     REVIEW OF SYSTEMS:  A comprehensive review of systems was negative except for that written in the History of Present Illness.     Past Medical History:   Diagnosis Date    Atrial fibrillation (HCC)     Dementia (HCC)     Depression     GERD (gastroesophageal reflux disease)     Parkinson disease (HCC) 2015      History reviewed. No pertinent surgical history.  Prior to Admission medications    Medication Sig Start Date End Date Taking? Authorizing Provider   pimavanserin tartrate (NUPLAZID) 34 MG CAPS capsule Take 1 capsule by mouth daily   Yes Provider, MD Abigail  Sulfamethoxazole-Trimethoprim Other (See Comments)     Vision change      History reviewed. No pertinent family history.   Social History:  reports that she has never smoked. She has never used smokeless tobacco. She reports that she does not drink alcohol and does not use drugs.   Social Determinants of Health     Tobacco Use: Low Risk  (9/25/2024)    Patient History     Smoking Tobacco Use: Never     Smokeless Tobacco Use: Never     Passive Exposure: Not on file   Alcohol Use: Not At Risk (2/26/2021)    Received from [facility]  (prior to 6/17/2023)    AUDIT-C     Frequency of Alcohol Consumption: Never     Average Number of Drinks: Not on file     Frequency of Binge Drinking: Not on file   Financial Resource Strain: Not on file   Food Insecurity: No Food Insecurity (5/3/2024)    Received from [facility]    Hunger Vital Sign     Worried About Running Out of Food in the Last Year: Never true     Ran Out of Food in the Last Year: Never true   Transportation Needs: No Transportation Needs (5/3/2024)    Received from [facility]    PRAPARE - Transportation     Lack of Transportation (Medical): No     Lack of Transportation (Non-Medical): No   Physical Activity: Not on file   Stress: Not on file   Social Connections: Not on file   Intimate Partner Violence: Not on file   Depression: Not at risk (6/14/2024)    Received from [facility]    PHQ-2     Patient Health Questionnaire-2 Score: 2   Housing Stability: Not on file   Interpersonal Safety: Not on file   Utilities: Not At Risk (5/3/2024)    Received from [facility]     Threatened with loss of utilities: No        Medications were reconciled to the best of my ability given all available resources at the time of admission. Route is PO if not otherwise noted.     Family and social history were personally reviewed, all pertinent and relevant details are outlined as above.    Objective:   /84   Pulse 70   Temp 97.8 °F (36.6 °C)  vascular abnormality, no large vessel occlusion. No hemodynamically   significant stenosis, normal perfusion.      Electronically signed by ALF DOYLE      CT HEAD WO CONTRAST   Final Result   No acute intracranial process identified            Electronically signed by Stephanie Pop      MRI brain without contrast    (Results Pending)        ECG/ECHO:    Encounter Date: 09/25/24   EKG 12 Lead   Result Value    Ventricular Rate 104    QRS Duration 138    Q-T Interval 418    QTc Calculation (Bazett) 549    R Axis 2    T Axis 190    Diagnosis      Atrial fibrillation with rapid ventricular response  Left bundle branch block  Abnormal ECG  When compared with ECG of 19-APR-2023 16:29,  Atrial fibrillation has replaced Sinus rhythm  Vent. rate has increased BY  45 BPM  Left bundle branch block is now present       04/17/23    TRANSTHORACIC ECHOCARDIOGRAM (TTE) COMPLETE (CONTRAST/BUBBLE/3D PRN) 04/20/2023  2:37 PM (Final)    Narrative  This is a summary report. The complete report is available in the patient's medical record. If you cannot access the medical record, please contact the sending organization for a detailed fax or copy.      Left Ventricle: Normal left ventricular systolic function with a visually estimated EF of 55 - 60%. Left ventricle size is normal. Normal wall thickness. Normal wall motion. Normal diastolic function.    Mitral Valve: Mild to moderate regurgitation.    Left Atrium: Left atrium is severely dilated.    Signed by: Darron Lopez MD on 4/20/2023  2:37 PM        Notes reviewed from all clinical/nonclinical/nursing services involved in patient's clinical care. Care coordination discussions were held with appropriate clinical/nonclinical/ nursing providers based on care coordination needs.     Assessment:   Given the patient's current clinical presentation, there is a high level of concern for decompensation if discharged from the emergency department. Complex decision making was performed,

## 2024-09-26 NOTE — ED NOTES
Pt offered meal tray but pt's granddaughter reports a family member is bringing pt food from outside.

## 2024-09-27 VITALS
TEMPERATURE: 98.1 F | HEART RATE: 71 BPM | WEIGHT: 216.71 LBS | SYSTOLIC BLOOD PRESSURE: 154 MMHG | HEIGHT: 66 IN | RESPIRATION RATE: 16 BRPM | DIASTOLIC BLOOD PRESSURE: 87 MMHG | OXYGEN SATURATION: 99 % | BODY MASS INDEX: 34.83 KG/M2

## 2024-09-27 PROBLEM — I95.89 CHRONIC HYPOTENSION: Status: ACTIVE | Noted: 2024-09-27

## 2024-09-27 PROBLEM — G21.9 SECONDARY PARKINSONISM (HCC): Status: ACTIVE | Noted: 2024-09-27

## 2024-09-27 PROBLEM — F02.818 LEWY BODY DEMENTIA WITH BEHAVIORAL DISTURBANCE (HCC): Status: ACTIVE | Noted: 2024-09-27

## 2024-09-27 PROBLEM — R29.810 FACIAL DROOP: Status: ACTIVE | Noted: 2024-09-27

## 2024-09-27 PROBLEM — R47.01 APHASIA: Status: ACTIVE | Noted: 2024-09-27

## 2024-09-27 PROBLEM — G31.83 LEWY BODY DEMENTIA WITH BEHAVIORAL DISTURBANCE (HCC): Status: ACTIVE | Noted: 2024-09-27

## 2024-09-27 PROCEDURE — 95816 EEG AWAKE AND DROWSY: CPT

## 2024-09-27 PROCEDURE — 95816 EEG AWAKE AND DROWSY: CPT | Performed by: PSYCHIATRY & NEUROLOGY

## 2024-09-27 PROCEDURE — 6370000000 HC RX 637 (ALT 250 FOR IP)

## 2024-09-27 PROCEDURE — 6370000000 HC RX 637 (ALT 250 FOR IP): Performed by: FAMILY MEDICINE

## 2024-09-27 PROCEDURE — 2580000003 HC RX 258: Performed by: FAMILY MEDICINE

## 2024-09-27 PROCEDURE — 97530 THERAPEUTIC ACTIVITIES: CPT | Performed by: PHYSICAL THERAPIST

## 2024-09-27 PROCEDURE — 99232 SBSQ HOSP IP/OBS MODERATE 35: CPT | Performed by: PSYCHIATRY & NEUROLOGY

## 2024-09-27 PROCEDURE — 92526 ORAL FUNCTION THERAPY: CPT

## 2024-09-27 PROCEDURE — 97116 GAIT TRAINING THERAPY: CPT | Performed by: PHYSICAL THERAPIST

## 2024-09-27 RX ORDER — FLUDROCORTISONE ACETATE 0.1 MG/1
0.1 TABLET ORAL 2 TIMES DAILY
Status: DISCONTINUED | OUTPATIENT
Start: 2024-09-27 | End: 2024-09-27 | Stop reason: HOSPADM

## 2024-09-27 RX ORDER — ATORVASTATIN CALCIUM 20 MG/1
20 TABLET, FILM COATED ORAL NIGHTLY
Qty: 30 TABLET | Refills: 3 | Status: SHIPPED | OUTPATIENT
Start: 2024-09-27

## 2024-09-27 RX ORDER — FLUDROCORTISONE ACETATE 0.1 MG/1
0.1 TABLET ORAL DAILY
Qty: 30 TABLET | Refills: 0 | Status: SHIPPED | OUTPATIENT
Start: 2024-09-27 | End: 2024-10-27

## 2024-09-27 RX ADMIN — TROSPIUM CHLORIDE 20 MG: 20 TABLET, FILM COATED ORAL at 09:07

## 2024-09-27 RX ADMIN — SODIUM CHLORIDE, PRESERVATIVE FREE 10 ML: 5 INJECTION INTRAVENOUS at 09:08

## 2024-09-27 RX ADMIN — APIXABAN 5 MG: 5 TABLET, FILM COATED ORAL at 09:08

## 2024-09-27 RX ADMIN — FLUDROCORTISONE ACETATE 0.1 MG: 0.1 TABLET ORAL at 09:07

## 2024-09-27 RX ADMIN — CARBIDOPA AND LEVODOPA 1 TABLET: 50; 200 TABLET, EXTENDED RELEASE ORAL at 09:07

## 2024-09-27 RX ADMIN — CARBIDOPA AND LEVODOPA 1 TABLET: 50; 200 TABLET, EXTENDED RELEASE ORAL at 13:18

## 2024-09-27 NOTE — PLAN OF CARE
Problem: Occupational Therapy - Adult  Goal: By Discharge: Performs self-care activities at highest level of function for planned discharge setting.  See evaluation for individualized goals.  Description: FUNCTIONAL STATUS PRIOR TO ADMISSION:    Receives Help From: Family, ADL Assistance: Needs assistance,  ,  ,  , Feeding: Independent, Toileting: Needs assistance,  , Ambulation Assistance: Independent, Transfer Assistance: Independent, Active : No     HOME SUPPORT: Patient lived with daughter and 2 granddaughters to provide assistance full time assistance. At baseline she mobilizes with rollator and requires some level of assistance for all ADLs    Occupational Therapy Goals:  Initiated 9/26/2024  1.  Patient will perform self-feeding with Set-up within 7 day(s).  2.  Patient will perform grooming with Minimal Assist within 7 day(s).  3.  Patient will perform upper body dressing with Minimal Assist within 7 day(s).  4.  Patient will perform toilet transfers with Minimal Assist  within 7 day(s).  5.  Patient will perform all aspects of toileting with Moderate Assist within 7 day(s).  6.  Patient will participate in upper extremity therapeutic exercise/activities with Minimal Assist for 5 minutes within 7 day(s).    7.  Patient will utilize energy conservation techniques during functional activities with verbal and visual cues within 7 day(s).    9/26/2024 1159 by Bronwyn Melara OT  Outcome: Progressing     Problem: Physical Therapy - Adult  Goal: By Discharge: Performs mobility at highest level of function for planned discharge setting.  See evaluation for individualized goals.  Description: FUNCTIONAL STATUS PRIOR TO ADMISSION: Patient was modified independent using a rolling walker and rollator for functional mobility.    HOME SUPPORT PRIOR TO ADMISSION: The patient lived with daughter and granddaughters and required moderate assistance for IADL's, ADL's.    Physical Therapy Goals  Initiated  Progressing  Flowsheets  Taken 9/26/2024 2000 by Chinmay Kay RN (Aika)  Free From Fall Injury: Instruct family/caregiver on patient safety  Taken 9/26/2024 1640 by Linda Rousseau RN  Free From Fall Injury:   Instruct family/caregiver on patient safety   Based on caregiver fall risk screen, instruct family/caregiver to ask for assistance with transferring infant if caregiver noted to have fall risk factors     Problem: Chronic Conditions and Co-morbidities  Goal: Patient's chronic conditions and co-morbidity symptoms are monitored and maintained or improved  9/27/2024 0112 by Chinmay Kay RN (Aika)  Outcome: Progressing  9/26/2024 2120 by Linda Rousseau RN  Outcome: Progressing  Flowsheets (Taken 9/26/2024 2000 by Chinmay Kay RN (Aika))  Care Plan - Patient's Chronic Conditions and Co-Morbidity Symptoms are Monitored and Maintained or Improved:   Monitor and assess patient's chronic conditions and comorbid symptoms for stability, deterioration, or improvement   Collaborate with multidisciplinary team to address chronic and comorbid conditions and prevent exacerbation or deterioration   Update acute care plan with appropriate goals if chronic or comorbid symptoms are exacerbated and prevent overall improvement and discharge

## 2024-09-27 NOTE — DISCHARGE SUMMARY
Discharge Summary       PATIENT ID: Hanane Lee  MRN: [id number]   YOB: 1950    DATE OF ADMISSION: 9/25/2024  7:18 PM    DATE OF DISCHARGE: 9/27/24   PRIMARY CARE PROVIDER: Sun Alatorre APRN - NP     ATTENDING PHYSICIAN: earlene mc  DISCHARGING PROVIDER: Earlene Mc MD    To contact this individual call [phone] and ask the  to page.  If unavailable ask to be transferred the Adult Hospitalist Department.    CONSULTATIONS: IP CONSULT TO TELE-NEUROLOGY  IP CONSULT TO HOSPITALIST  IP CONSULT TO NEUROLOGY  IP CONSULT TO CASE MANAGEMENT    PROCEDURES/SURGERIES: * No surgery found *    ADMITTING DIAGNOSES & HOSPITAL COURSE:     Hanane Lee is a 74 y.o. female with past stroke with aphasia, atrial fibrillation, Parkinson disease, dementia, depression, and GERD who with acute expressive aphasia at about 6 PM this evening associated with ataxia.  Per patient's granddaughter who is at bedside, and gives majority of history they were eating dinner when patient developed her symptoms she states that patient was saying inappropriate words, and could not ambulate in spite of the fact that she ambulates with a walker at baseline.       #Expressive aphasia  #Left facial droop  #Ataxia  - Workup negative suspected hypotension and global decrease cerebral perfusion as etiology of spells seen by neurology increase Florinef to 0.1 mg twice daily follow-[facility] recommend  - MRI brain, -- Chronic microvascular ischemic disease with no acute infarction. Slight motion degradation ECHO pending  - Neurology consulted EEG normal patient was offered aspirin and antiepileptic medication but family decided to talk to [facility] neurologist     #Atrial fibrillation with RVR  -Continue home Eliquis     #Parkinson's  #Dementia  - Continue home medications     #Mild hypokalemia : replete oral     DISCHARGE DIAGNOSES / PLAN:       D/c home       PENDING TEST RESULTS:   At the  time of discharge the following test results are still pending:     [facility]    [facility]     ADDITIONAL CARE RECOMMENDATIONS:     DIET: cardiac diet      ACTIVITY: activity as tolerated    WOUND CARE:     EQUIPMENT needed:       DISCHARGE MEDICATIONS:     Medication List        START taking these medications      atorvastatin 20 MG tablet  Commonly known as: LIPITOR  Take 1 tablet by mouth nightly            CONTINUE taking these medications      acetaminophen 500 MG tablet  Commonly known as: TYLENOL     apixaban 5 MG Tabs tablet  Commonly known as: ELIQUIS     calcium carbonate 600 MG Tabs tablet     carbidopa-levodopa  MG per extended release tablet  Commonly known as: SINEMET CR     cephALEXin 250 MG capsule  Commonly known as: KEFLEX     D-Mannose 500 MG Caps     donepezil 10 MG tablet  Commonly known as: ARICEPT     estradiol 0.1 MG/GM vaginal cream  Commonly known as: ESTRACE     fludrocortisone 0.1 MG tablet  Commonly known as: FLORINEF  Take 1 tablet by mouth daily     Lexapro 10 MG tablet  Generic drug: escitalopram     meclizine 25 MG tablet  Commonly known as: ANTIVERT     melatonin 3 MG Tabs tablet     Nuplazid 34 MG Caps capsule  Generic drug: pimavanserin tartrate     omeprazole 20 MG delayed release capsule  Commonly known as: PRILOSEC     QUEtiapine 25 MG tablet  Commonly known as: SEROQUEL     therapeutic multivitamin-minerals tablet     trospium 60 MG Cp24 extended release capsule  Commonly known as: SANCTURA     Ventolin  (90 Base) MCG/ACT inhaler  Generic drug: albuterol sulfate HFA     vibegron 75 MG Tabs tablet  Commonly known as: GEMTESA            STOP taking these medications      HYDROcodone-acetaminophen 5-325 MG per tablet  Commonly known as: NORCO               Where to Get Your Medications        These medications were sent to [id number] [place], [place] - [address] - P [phone] - F [phone] [address],  [facility] [address]      Phone: [phone]   atorvastatin 20 MG tablet  fludrocortisone 0.1 MG tablet           NOTIFY YOUR PHYSICIAN FOR ANY OF THE FOLLOWING:   Fever over 101 degrees for 24 hours.   Chest pain, shortness of breath, fever, chills, nausea, vomiting, diarrhea, change in mentation, falling, weakness, bleeding. Severe pain or pain not relieved by medications.  Or, any other signs or symptoms that you may have questions about.    DISPOSITION:   x Home With:   OT  PT  HH  RN       Long term SNF/Inpatient Rehab    Independent/assisted living    Hospice    Other:       PATIENT CONDITION AT DISCHARGE:     Functional status    Poor    x Deconditioned     Independent      Cognition     Lucid    x Forgetful    x Dementia      Catheters/lines (plus indication)    Marshall     PICC     PEG    x None      Code status    x Full code     DNR      PHYSICAL EXAMINATION AT DISCHARGE:    General : no acute distress,   HEENT: moist mucus membrane  Neck:, no meningeal signs  Chest: Clear to auscultation bilaterally   CVS: S1 S2 heard, Capillary refill less than 2 seconds  Abd: soft/ non tender, non distended, BS physiological,   Ext: no clubbing, no cyanosis,   Neuro/Psych: pleasant mood and affect    CHRONIC MEDICAL DIAGNOSES:      Greater than 31 minutes were spent with the patient on counseling and coordination of care    Signed:   Earlene Mc MD  9/27/2024  2:24 PM

## 2024-09-27 NOTE — PLAN OF CARE
Problem: Physical Therapy - Adult  Goal: By Discharge: Performs mobility at highest level of function for planned discharge setting.  See evaluation for individualized goals.  Description: FUNCTIONAL STATUS PRIOR TO ADMISSION: Patient was modified independent using a rolling walker and rollator for functional mobility.    HOME SUPPORT PRIOR TO ADMISSION: The patient lived with daughter and granddaughters and required moderate assistance for IADL's, ADL's.    Physical Therapy Goals  Initiated 9/26/2024  1.  Patient will move from supine to sit and sit to supine, scoot up and down, and roll side to side in bed with minimal assistance within 7 day(s).    2.  Patient will perform sit to stand with contact guard assist within 7 day(s).  3.  Patient will transfer from bed to chair and chair to bed with contact guard assist using the least restrictive device within 7 day(s).  4.  Patient will ambulate with contact guard assist for 50 feet with the least restrictive device within 7 day(s).   5.  Patient will ascend/descend 5 stairs with one handrail(s) with minimal assistance within 7 day(s).  6.  Patient will improve Jolly Balance score by 4 points within 7 days.   9/27/2024 1304 by Jhoana Garcia, PT  Outcome: Progressing

## 2024-09-27 NOTE — CARE COORDINATION
Care Management Initial Assessment       RUR:  11%  Readmission? No  1st IM letter given? Yes - 9/26  1st  letter given: Yes - 9/26    SRAVANI: pt admitted from home, will return home when medically stable    Transport: family van     CM met with pt at bedside to introduce self and role. Pt lives with son-in-law, dtr, and grand children. Pt not active  - family transports.     ADLs: family assist with all ADLs  DME: rollator, U Step, grab bars, walk in shower, shower chair, and WC for long trips  PCP follow up: PCP confirmed   Previous Home Health: none  Previous Skilled Nursing Facility: none  Previous Inpatient Rehab: none  Insurance verified: yes; Medicare A and B,   Pharmacy: [facility] or [facility] in [place]   Emergency Contact: Babita Wall dtr, [phone]    Pt currently open with [facility] OP PT 2x week. PT/OT recommending home health at [place], but family prefers to continue OP services. Pt has family care and supervision 24/7.     CM will follow patient progress and assist as needed with SRAVANI plan.       09/27/24 1159   Service Assessment   Patient Orientation Other (see comment)  (completed assessment with son-in-law due to pt working with therapy team)   Cognition Alert   History Provided By Child/Family   Primary Caregiver Family   Accompanied By/Relationship son-in-law   Support Systems Children;Family Members   Patient's Healthcare Decision Maker is: Named in Scanned ACP Document   PCP Verified by CM Yes   Last Visit to PCP Within last 3 months   Prior Functional Level Assistance with the following:;Bathing;Dressing;Toileting;Cooking;Feeding;Housework;Shopping;Mobility   Current Functional Level Assistance with the following:;Bathing;Dressing;Toileting;Feeding;Cooking;Housework;Mobility;Shopping   Can patient return to prior living arrangement Yes   Ability to make needs known: Good   Family able to assist with home care needs: Yes   Would you like for me to discuss the

## 2024-09-27 NOTE — PROGRESS NOTES
Physician Progress Note      PATIENT:               NIMA GOODSON  [facility] #:                  [id number]  :                       1950  ADMIT DATE:       2024 7:18 PM  DISCH DATE:  RESPONDING  PROVIDER #:        Earlene Mc MD          QUERY TEXT:    Patient admitted with aphasia, noted to have atrial fibrillation and is   maintained on eliquis. If possible, please document in progress notes and   discharge summary if you are evaluating and/or treating any of the following:?  ?  The medical record reflects the following:  Risk Factors: a fib  Clinical Indicators:  Atrial fibrillation with RVR  - Patient given IV fluids for suspected dehydration  - She subsequently converted to normal sinus rhyth  -Continue home Eliquis    Treatment:eliquis    Thank you  Laquita Delcid RN, CDI, CCDS, CRCR  Certified  Clinical Documentation   O: [phone]  [email or web address]  I can also be reached by perfect serve  Options provided:  -- Secondary hypercoagulable state in a patient with atrial fibrillation  -- Other - I will add my own diagnosis  -- Disagree - Not applicable / Not valid  -- Disagree - Clinically unable to determine / Unknown  -- Refer to Clinical Documentation Reviewer    PROVIDER RESPONSE TEXT:    This patient has secondary hypercoagulable state in a patient with atrial   fibrillation.    Query created by: Laquita Delcid on 2024 8:27 AM      Electronically signed by:  Earlene Mc MD 2024 9:24 AM

## 2024-09-27 NOTE — PROGRESS NOTES
Pt arrived from ED hypotensive.  She was asymptomatic  I notified Dr. Mc and he ordered a 500 mL bolus and a dose of albumin.    Patient's BP corrected before medications were verified by pharmacy, so I held the bolus and albumin.

## 2024-09-27 NOTE — PLAN OF CARE
Problem: SLP Adult - Impaired Swallowing  Goal: By Discharge: Advance to least restrictive diet without signs or symptoms of aspiration for planned discharge setting.  See evaluation for individualized goals.  Description: 9/26/2024  Speech path goals  1. Patient will tolerate safest, least restrictive diet with no overt s/s of aspiration.   Outcome: Completed     Speech LAnguage Pathology TREATMENT/DISCHARGE    Patient: Hanane Lee (74 y.o. female)  Date: 9/27/2024  Primary Diagnosis: Dysarthria [R47.1]  Left-sided weakness [R53.1]  Atrial fibrillation with rapid ventricular response (HCC) [I48.91]  Focal neurological deficit [R29.818]       Precautions: Aspiration                    ASSESSMENT :  Pt sitting upright in bed with family at bedside. Pt agreeable to session, denies any pain and no difficulty with swallowing. Pt able to recall breakfast and able to to take her medications whole with thin liquids today.     Pt observed with thin liquids via cup, applesauce and cracker. Pt with functional ability to self feed, functional mastication, mildly prolonged oral transit but functional, and adequate oral containment. Pt without any s/s of aspiration, although x1 occurrence of gurgly vocal quality that cleared with subsequent swallow. Pt tends to attempt to talk while eating and drinking causing a wet vocal quality intermittently. Education provided on swallow strategies with appreciation by both her and her family.     Recommend continuing with an easy to chew diet and thin liquids using small bites and sips, no talking while eating and using a slow rate. Pt is at an increased risk for aspiration due to her pmh of PD and LBD. Speech to s/o at this time, please re-consult if changes.     Patient will be discharged from skilled speech-language pathology services at this time.     PLAN :  Recommendations and Planned Interventions:  Diet: Easy to chew and thin liquids  -small bites  -small, single  sips  -upright for all po intake  -remain upright for at least 30 minutes after po intake  -no talking while eating or drinking     Acute SLP Services: No, patient will be discharged from acute skilled speech-language pathology at this time.    Discharge Recommendations: No, additional SLP treatment not indicated at discharge     SUBJECTIVE:   Patient stated, “hello.”    OBJECTIVE:     Past Medical History:   Diagnosis Date    Atrial fibrillation (HCC)     Depression     GERD (gastroesophageal reflux disease)     Lewy body dementia with behavioral disturbance (HCC)     [facility] An[facility] Luther   History reviewed. No pertinent surgical history.  Prior Level of Function/Home Situation:   Social/Functional History  Lives With: Family  Type of Home: House  Home Layout: Two level, Able to Live on Main level with bedroom/bathroom  Home Access: Stairs to enter with rails  Entrance Stairs - Number of Steps: 6  Entrance Stairs - Rails: Left  Bathroom Shower/Tub: Walk-in shower, Shower chair with back  Bathroom Equipment: Grab bars in shower, Grab bars around toilet  Home Equipment: Rollator, Wheelchair - Manual (U step rollator)  Has the patient had two or more falls in the past year or any fall with injury in the past year?: Yes  Receives Help From: Family  ADL Assistance: Needs assistance  Feeding: Independent  Toileting: Needs assistance  Homemaking Assistance: Needs assistance  Homemaking Responsibilities: No  Ambulation Assistance: Needs assistance  Transfer Assistance: Needs assistance  Active : No  Patient's  Info: family    Cognitive and Communication Status:  Neurologic State: Alert  Orientation Level: Oriented to person and Oriented to place  Cognition: Follows commands    Dysphagia:  Oral Assessment:  P.O. Trials:  PO Trials  Assessment Method(s): Observation  Patient Position: upright in bed  Vocal Quality: No Impairment  Consistency Presented: Thin;Pureed;Regular  How Presented:  Self-fed/presented;Spoon;Cup/sip;Successive Swallows  Bolus Acceptance: No impairment  Bolus Formation/Control: No impairment  Type of Impairment: Delayed  Propulsion: No impairment  Oral Residue: None  Aspiration Signs/Symptoms: None  Effective Modifications: Small sips and bites    Respiratory Status/Airway:  Room air     After treatment:   Patient left in no apparent distress sitting up in chair, Call bell left within reach, Nursing notified, and Caregiver present    COMMUNICATION/EDUCATION:   The patient's plan of care including recommendations, planned interventions, and recommended diet changes were discussed with: Registered nurse    Patient/family agree to work toward stated goals and plan of care    Thank you,  Zena Melara, SLP    SLP Individual Minutes  Time In: 1125  Time Out: 1140  Minutes: 15

## 2024-09-27 NOTE — PLAN OF CARE
Problem: Discharge Planning  Goal: Discharge to home or other facility with appropriate resources  9/27/2024 1504 by Reinier Gamez RN  Outcome: Progressing  9/27/2024 0112 by Chinmay Kay RN (Aika)  Outcome: Progressing     Problem: Safety - Adult  Goal: Free from fall injury  9/27/2024 1504 by Reinier Gamez RN  Outcome: Progressing  9/27/2024 0112 by Chinmay Kay RN (Aika)  Outcome: Progressing     Problem: Chronic Conditions and Co-morbidities  Goal: Patient's chronic conditions and co-morbidity symptoms are monitored and maintained or improved  9/27/2024 0112 by Chinmay Kay RN (Aika)  Outcome: Progressing

## 2024-09-27 NOTE — PLAN OF CARE
Problem: Physical Therapy - Adult  Goal: By Discharge: Performs mobility at highest level of function for planned discharge setting.  See evaluation for individualized goals.  Description: FUNCTIONAL STATUS PRIOR TO ADMISSION: Patient was modified independent using a rolling walker and rollator for functional mobility.    HOME SUPPORT PRIOR TO ADMISSION: The patient lived with daughter and granddaughters and required moderate assistance for IADL's, ADL's.    Physical Therapy Goals  Initiated 9/26/2024  1.  Patient will move from supine to sit and sit to supine, scoot up and down, and roll side to side in bed with minimal assistance within 7 day(s).    2.  Patient will perform sit to stand with contact guard assist within 7 day(s).  3.  Patient will transfer from bed to chair and chair to bed with contact guard assist using the least restrictive device within 7 day(s).  4.  Patient will ambulate with contact guard assist for 50 feet with the least restrictive device within 7 day(s).   5.  Patient will ascend/descend 5 stairs with one handrail(s) with minimal assistance within 7 day(s).  6.  Patient will improve Jolly Balance score by 4 points within 7 days.   Outcome: Progressing     PHYSICAL THERAPY TREATMENT    Patient: Hanane Lee (74 y.o. female)  Date: 9/27/2024  Diagnosis: Dysarthria [R47.1]  Left-sided weakness [R53.1]  Atrial fibrillation with rapid ventricular response (HCC) [I48.91]  Focal neurological deficit [R29.818] Focal neurological deficit      Precautions:                        ASSESSMENT:  Patient continues to benefit from skilled PT services and is progressing towards goals. The patient is in the bed on arrival and her son in law is present.  She is agreeable to get up to the chair.  She required assist to come to the EOB and to scoot forward to the EOB.  Once up her sitting balance is good.  She stood and used a RW to step to the bedside chair.  She stood again to get cleaned up due to the

## 2024-09-27 NOTE — DISCHARGE INSTRUCTIONS
Discharge Instructions       PATIENT ID: Hanane Lee  MRN: [id number]   YOB: 1950    DATE OF ADMISSION: 9/25/2024   DATE OF DISCHARGE: 9/27/2024    PRIMARY CARE PROVIDER: Sun Alatorre     ATTENDING PHYSICIAN: Earlene Mc MD   DISCHARGING PROVIDER: Earlene Mc MD    To contact this individual call [phone] and ask the  to page.   If unavailable ask to be transferred the Adult Hospitalist Department.    DISCHARGE DIAGNOSES TIA    CONSULTATIONS: @[facility]@    PROCEDURES/SURGERIES: * No surgery found *    PENDING TEST RESULTS:   At the time of discharge the following test results are still pending:     [facility]   [facility]     ADDITIONAL CARE RECOMMENDATIONS:     DIET: cardiac diet      ACTIVITY: activity as tolerated    WOUND CARE:     EQUIPMENT needed:       DISCHARGE MEDICATIONS:   See Medication Reconciliation Form    It is important that you take the medication exactly as they are prescribed.   Keep your medication in the bottles provided by the pharmacist and keep a list of the medication names, dosages, and times to be taken in your wallet.   Do not take other medications without consulting your doctor.       NOTIFY YOUR PHYSICIAN FOR ANY OF THE FOLLOWING:   Fever over 101 degrees for 24 hours.   Chest pain, shortness of breath, fever, chills, nausea, vomiting, diarrhea, change in mentation, falling, weakness, bleeding. Severe pain or pain not relieved by medications.  Or, any other signs or symptoms that you may have questions about.      DISPOSITION:   X Home With:   OT  PT  HH  RN       SNF/Inpatient Rehab/LTAC    Independent/assisted living    Hospice    Other:     CDMP Checked:   Yes X     PROBLEM LIST Updated:  Yes X       Signed:   Earlene Mc MD  9/27/2024  2:08 PM

## 2024-09-27 NOTE — PROCEDURES
PROCEDURE: ROUTINE INPATIENT EEG  NAME:   Hanane Lee  ACCOUNT NUMBER : [id number]  MRN:   [id number]  DATE OF SERVICE: 9/27/2024     HISTORY/INDICATION: Patient is a 74-year-old female with recurrent spells of garbled speech.  EEG is performed to assess for evidence of underlying epilepsy as an etiology.  Patient has seizure risk factor of Lewy body dementia.    MEDICATIONS:   Current Facility-Administered Medications   Medication Dose Route Frequency Provider Last Rate Last Admin    dextrose 5 % and 0.9 % sodium chloride infusion   IntraVENous Continuous Sherri Figueroa MD   Stopped at 09/26/24 1446    apixaban (ELIQUIS) tablet 5 mg  5 mg Oral BID Sherri Figueroa MD   5 mg at 09/27/24 0908    trospium (SANCTURA) tablet 20 mg  20 mg Oral BID Sherri Figueroa MD   20 mg at 09/27/24 0907    QUEtiapine (SEROQUEL) tablet 50 mg  50 mg Oral Nightly Sherri Figueroa MD   50 mg at 09/26/24 2216    pimavanserin tartrate (NUPLAZID) capsule 34 mg  34 mg Oral Daily Sherri Figueroa MD        pantoprazole (PROTONIX) tablet 40 mg  40 mg Oral Every Other Day Sherri Figueroa MD        fludrocortisone (FLORINEF) tablet 0.1 mg  0.1 mg Oral Daily Sherri Figueroa MD   0.1 mg at 09/27/24 0907    donepezil (ARICEPT) tablet 10 mg  10 mg Oral QPM Earlene Mc MD   10 mg at 09/26/24 1756    vibegron (GEMTESA) tablet 75 mg  75 mg Oral Daily Earlene Mc MD        atorvastatin (LIPITOR) tablet 20 mg  20 mg Oral Nightly Dianelys Hernandez MD        carbidopa-levodopa (SINEMET CR)  MG per extended release tablet 1 tablet  1 tablet Oral TID Choco Alegre RPH   1 tablet at 09/27/24 0907    sodium chloride 0.9 % bolus 500 mL  500 mL IntraVENous Once Earlene Mc MD        sodium chloride flush 0.9 % injection 5-40 mL  5-40 mL IntraVENous 2 times per day Sherri Figueroa MD   10 mL at 09/27/24 0908    sodium chloride flush 0.9 % injection 5-40 mL  5-40 mL IntraVENous PRN Sherri Figueroa MD        0.9 % sodium chloride  infusion   IntraVENous PRN Sherri Figueroa MD        ondansetron (ZOFRAN-ODT) disintegrating tablet 4 mg  4 mg Oral Q8H PRN Sherri Figueroa MD        Or    ondansetron (ZOFRAN) injection 4 mg  4 mg IntraVENous Q6H PRN Sherri Figueroa MD        polyethylene glycol (GLYCOLAX) packet 17 g  17 g Oral Daily PRN Sherri Figueroa MD           CONDITIONS OF RECORDING: This is a routine 21-channel EEG recording performed in accordance with the international 10-20 system with one channel devoted to limited EKG. This study was done during states of wakefulness.  No activating procedures were performed.    DESCRIPTION:   Upon maximal arousal the posterior dominant rhythm has a frequency of 9Hz with an amplitude of 20uV. This activity is symmetric over the bilateral posterior derivations and attenuates with eye opening.  There is intermittent 3-4 Hz rhythmic activity in the bifrontal regions lasting 1-2 seconds consistent with frontal intermittent rhythmic delta activity or FIRDA.  No sleep is seen.  There are no focal abnormalities, epileptiform discharges, or electrographic seizures seen.     INTERPRETATION: This is a probably normal EEG.  There is FIRDA seen but this can be normal in elderly patients    CLINICAL CORRELATION: A normal EEG does not definitively exclude a diagnosis of epilepsy if clinical suspicion is high consider sleep deprived EEG.     Dianelys Hernandez MD

## 2024-09-27 NOTE — PLAN OF CARE
Problem: SLP Adult - Impaired Swallowing  Goal: By Discharge: Advance to least restrictive diet without signs or symptoms of aspiration for planned discharge setting.  See evaluation for individualized goals.  Description: 9/26/2024  Speech path goals  1. Patient will tolerate safest, least restrictive diet with no overt s/s of aspiration.   9/26/2024 0946 by Jade Roy SLP  Outcome: Progressing     Problem: Occupational Therapy - Adult  Goal: By Discharge: Performs self-care activities at highest level of function for planned discharge setting.  See evaluation for individualized goals.  Description: FUNCTIONAL STATUS PRIOR TO ADMISSION:    Receives Help From: Family, ADL Assistance: Needs assistance,  ,  ,  , Feeding: Independent, Toileting: Needs assistance,  , Ambulation Assistance: Independent, Transfer Assistance: Independent, Active : No     HOME SUPPORT: Patient lived with daughter and 2 granddaughters to provide assistance full time assistance. At baseline she mobilizes with rollator and requires some level of assistance for all ADLs    Occupational Therapy Goals:  Initiated 9/26/2024  1.  Patient will perform self-feeding with Set-up within 7 day(s).  2.  Patient will perform grooming with Minimal Assist within 7 day(s).  3.  Patient will perform upper body dressing with Minimal Assist within 7 day(s).  4.  Patient will perform toilet transfers with Minimal Assist  within 7 day(s).  5.  Patient will perform all aspects of toileting with Moderate Assist within 7 day(s).  6.  Patient will participate in upper extremity therapeutic exercise/activities with Minimal Assist for 5 minutes within 7 day(s).    7.  Patient will utilize energy conservation techniques during functional activities with verbal and visual cues within 7 day(s).    9/26/2024 1159 by Bronwyn Melara OT  Outcome: Progressing     Problem: Physical Therapy - Adult  Goal: By Discharge: Performs mobility at highest level of  function for planned discharge setting.  See evaluation for individualized goals.  Description: FUNCTIONAL STATUS PRIOR TO ADMISSION: Patient was modified independent using a rolling walker and rollator for functional mobility.    HOME SUPPORT PRIOR TO ADMISSION: The patient lived with daughter and granddaughters and required moderate assistance for IADL's, ADL's.    Physical Therapy Goals  Initiated 9/26/2024  1.  Patient will move from supine to sit and sit to supine, scoot up and down, and roll side to side in bed with minimal assistance within 7 day(s).    2.  Patient will perform sit to stand with contact guard assist within 7 day(s).  3.  Patient will transfer from bed to chair and chair to bed with contact guard assist using the least restrictive device within 7 day(s).  4.  Patient will ambulate with contact guard assist for 50 feet with the least restrictive device within 7 day(s).   5.  Patient will ascend/descend 5 stairs with one handrail(s) with minimal assistance within 7 day(s).  6.  Patient will improve Jolly Balance score by 4 points within 7 days.   9/26/2024 1318 by Yamilet Morrison, PT  Outcome: Progressing     Problem: Discharge Planning  Goal: Discharge to home or other facility with appropriate resources  Outcome: Progressing  Flowsheets (Taken 9/26/2024 1640)  Discharge to home or other facility with appropriate resources:   Identify barriers to discharge with patient and caregiver   Arrange for needed discharge resources and transportation as appropriate   Identify discharge learning needs (meds, wound care, etc)   Arrange for interpreters to assist at discharge as needed   Refer to discharge planning if patient needs post-hospital services based on physician order or complex needs related to functional status, cognitive ability or social support system     Problem: Safety - Adult  Goal: Free from fall injury  Outcome: Progressing  Flowsheets (Taken 9/26/2024 1640)  Free From Fall Injury:    Instruct family/caregiver on patient safety   Based on caregiver fall risk screen, instruct family/caregiver to ask for assistance with transferring infant if caregiver noted to have fall risk factors     Problem: Chronic Conditions and Co-morbidities  Goal: Patient's chronic conditions and co-morbidity symptoms are monitored and maintained or improved  Outcome: Progressing

## 2024-09-27 NOTE — PROGRESS NOTES
Neurology Progress Note     NAME: Hanane Lee   :  1950   MRN:  [id number]   DATE:  2024    Assessment:     Principal Problem:    Focal neurological deficit  Resolved Problems:    * No resolved hospital problems. *    Pt is a 73yo RH female with Lewy body dementia with parkinsonism admitted 25 with aphasia and left facial droop while eating dinner at home, resolved in the CT scanner.  Has a history of 2 similar spells that were called TIAs. BP 95/64. CTH neg. CTA H/N no LVO, no significant stenosis. CTP neg. MRI brain with CIWM changes. EKG - Afib, on Eliquis at home.  CXR neg. LDL 51.8. HgbA1C 5.4. CBC with diff - .  UA neg.  On arrival to the floor yesterday evening, her BP was 61/39.    Exam - mild memory loss, bilateral hand mixed action and resting tremor.  EEG is normal     Suspected hypotension and global decreased cerebral perfusion as etiology of spells.  Seizure is in the differential, but seems less likely. Given normal EEG, will not start an ASD at this time.   Plan:   -Change Florinef to 0.1mg at 0900 and 1600  -F/u with Dr. Luther [facility] neurology  -Please call if needed.     Subjective:   Pt states she is ready to go home. She is seen with her GARIMA at the bedside. She has not had any spells since admission.  He clarifies that she is on Florinef 0.1mg qAM at home under the direction of Dr. Luther.     Objective:   Chart reviewed since last seen    Current Facility-Administered Medications   Medication Dose Route Frequency    dextrose 5 % and 0.9 % sodium chloride infusion   IntraVENous Continuous    apixaban (ELIQUIS) tablet 5 mg  5 mg Oral BID    trospium (SANCTURA) tablet 20 mg  20 mg Oral BID    QUEtiapine (SEROQUEL) tablet 50 mg  50 mg Oral Nightly    pimavanserin tartrate (NUPLAZID) capsule 34 mg  34 mg Oral Daily     algorithm.    FINDINGS:    DELAYED ENHANCEMENT HEAD CT  No intra or extra-axial mass or collection. Ventricles are normal in size and  configuration. The basal cisterns are patent.    Dural venous sinuses are patent. No abnormal parenchymal or meningeal  enhancement.    Mastoid air cells and paranasal sinuses are clear.    CTA NECK:    Great vessels: Normal arch anatomy with the origins patent.    Right subclavian artery: Patent    Left subclavian artery: Patent    Right common carotid artery: Mild disease of the bifurcation    Left common carotid artery: Moderate disease at the bifurcation    Cervical right internal carotid artery: Patent with no significant stenosis by  NASCET criteria.    Cervical left internal carotid artery: Moderate ostial disease with less than  50% stenosis by NASCET criteria.    Right vertebral artery: Patent    Left vertebral artery: Patent, arises directly from the aortic arch. Somewhat  diminutive compared to the right    The lung apices are clear. 1.3 cm left thyroid nodule.. No cervical  lymphadenopathy.    Measurements utilize NASCET criteria.    CTA HEAD:    Right cavernous internal carotid artery: Patent    Left cavernous internal carotid artery: Patent    Anterior cerebral arteries: Patent    Anterior communicating artery: Patent    Right middle cerebral artery: Patent    Left middle cerebral artery: Patent    Posterior communicating arteries: Widely patent on the left, diminutive on the  right.    Posterior cerebral arteries: Fetal origin on the left. Distal branches are  patent bilaterally.    Basilar artery: Patent    Distal vertebral arteries: Bilateral V4 segments are patent although the left is  somewhat diminutive compared to the right    No evidence for intracranial aneurysm or hemodynamically significant stenosis.    CT Perfusion:  Normal CT perfusion.    Impression  1. No acute vascular abnormality, no large vessel occlusion. No hemodynamically  significant stenosis,  normal perfusion.    Electronically signed by ALF DOYLE    Care Plan discussed with:  Patient x   Family x   RN    Care Manager    Consultant/Specialist:  x     Signed:Dianelys Hernandez MD

## 2024-10-05 NOTE — PROGRESS NOTES
Physician Progress Note      PATIENT:               NIMA GOODSON  [facility] #:                  [id number]  :                       1950  ADMIT DATE:       2024 7:18 PM  DISCH DATE:        2024 5:27 PM  RESPONDING  PROVIDER #:        Earlene Mc MD          QUERY TEXT:    Pt admitted with aphasia . Pt noted to have hypotension with Parkinson's   disease. If possible, please document in progress notes and discharge summary   the relationship, if any, between aphasia  and  Parkinson's disease.    The medical record reflects the following:  Risk Factors: hypotension  Clinical Indicators:    Patient with known Parkinson's presented with expressive aphasia, left facial   droop and ataxia. Per discharge summary \"Workup negative suspected hypotension   and global decrease cerebral perfusion as etiology of spells seen by   neurology increase Florinef to 0.1 mg twice daily follow-up with Dr. Luther   at [facility] neurology recommend\"      Treatment: neurology consult, Florinef  Thank you  Laquita Delcid RN, CDI, CCDS, CRCR  Certified  Clinical Documentation   O: [phone]  [email or web address]  I can also be reached by perfect serve  Options provided:  -- Neurogenic orthostatic hypotension due to Parkinson's disease  -- Other - I will add my own diagnosis  -- Disagree - Not applicable / Not valid  -- Disagree - Clinically unable to determine / Unknown  -- Refer to Clinical Documentation Reviewer    PROVIDER RESPONSE TEXT:    This patient has 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    Query created by: Laquita Delcid on 10/3/2024 6:00 AM      Electronically signed by:  Earlene Mc MD 10/5/2024 1:53 PM